# Patient Record
Sex: MALE | Race: WHITE | URBAN - METROPOLITAN AREA
[De-identification: names, ages, dates, MRNs, and addresses within clinical notes are randomized per-mention and may not be internally consistent; named-entity substitution may affect disease eponyms.]

---

## 2017-12-15 ENCOUNTER — OUTPATIENT (OUTPATIENT)
Dept: OUTPATIENT SERVICES | Facility: HOSPITAL | Age: 57
LOS: 1 days | Discharge: HOME | End: 2017-12-15

## 2017-12-15 DIAGNOSIS — A41.9 SEPSIS, UNSPECIFIED ORGANISM: ICD-10-CM

## 2017-12-15 DIAGNOSIS — C73 MALIGNANT NEOPLASM OF THYROID GLAND: ICD-10-CM

## 2017-12-15 DIAGNOSIS — J44.0 CHRONIC OBSTRUCTIVE PULMONARY DISEASE WITH (ACUTE) LOWER RESPIRATORY INFECTION: ICD-10-CM

## 2017-12-15 DIAGNOSIS — R91.8 OTHER NONSPECIFIC ABNORMAL FINDING OF LUNG FIELD: ICD-10-CM

## 2017-12-15 DIAGNOSIS — J44.1 CHRONIC OBSTRUCTIVE PULMONARY DISEASE WITH (ACUTE) EXACERBATION: ICD-10-CM

## 2017-12-15 DIAGNOSIS — I10 ESSENTIAL (PRIMARY) HYPERTENSION: ICD-10-CM

## 2017-12-15 DIAGNOSIS — J44.9 CHRONIC OBSTRUCTIVE PULMONARY DISEASE, UNSPECIFIED: ICD-10-CM

## 2017-12-15 DIAGNOSIS — E11.65 TYPE 2 DIABETES MELLITUS WITH HYPERGLYCEMIA: ICD-10-CM

## 2017-12-15 DIAGNOSIS — R78.81 BACTEREMIA: ICD-10-CM

## 2017-12-15 DIAGNOSIS — E06.3 AUTOIMMUNE THYROIDITIS: ICD-10-CM

## 2017-12-15 DIAGNOSIS — J15.9 UNSPECIFIED BACTERIAL PNEUMONIA: ICD-10-CM

## 2017-12-22 ENCOUNTER — EMERGENCY (EMERGENCY)
Facility: HOSPITAL | Age: 57
LOS: 0 days | Discharge: HOME | End: 2017-12-22

## 2017-12-22 DIAGNOSIS — E78.00 PURE HYPERCHOLESTEROLEMIA, UNSPECIFIED: ICD-10-CM

## 2017-12-22 DIAGNOSIS — A41.9 SEPSIS, UNSPECIFIED ORGANISM: ICD-10-CM

## 2017-12-22 DIAGNOSIS — J44.9 CHRONIC OBSTRUCTIVE PULMONARY DISEASE, UNSPECIFIED: ICD-10-CM

## 2017-12-22 DIAGNOSIS — R91.8 OTHER NONSPECIFIC ABNORMAL FINDING OF LUNG FIELD: ICD-10-CM

## 2017-12-22 DIAGNOSIS — J15.9 UNSPECIFIED BACTERIAL PNEUMONIA: ICD-10-CM

## 2017-12-22 DIAGNOSIS — R78.81 BACTEREMIA: ICD-10-CM

## 2017-12-22 DIAGNOSIS — R10.11 RIGHT UPPER QUADRANT PAIN: ICD-10-CM

## 2017-12-22 DIAGNOSIS — C73 MALIGNANT NEOPLASM OF THYROID GLAND: ICD-10-CM

## 2017-12-22 DIAGNOSIS — Z79.899 OTHER LONG TERM (CURRENT) DRUG THERAPY: ICD-10-CM

## 2017-12-22 DIAGNOSIS — J44.1 CHRONIC OBSTRUCTIVE PULMONARY DISEASE WITH (ACUTE) EXACERBATION: ICD-10-CM

## 2017-12-22 DIAGNOSIS — N28.1 CYST OF KIDNEY, ACQUIRED: ICD-10-CM

## 2017-12-22 DIAGNOSIS — I10 ESSENTIAL (PRIMARY) HYPERTENSION: ICD-10-CM

## 2017-12-22 DIAGNOSIS — R10.13 EPIGASTRIC PAIN: ICD-10-CM

## 2017-12-22 DIAGNOSIS — J44.0 CHRONIC OBSTRUCTIVE PULMONARY DISEASE WITH (ACUTE) LOWER RESPIRATORY INFECTION: ICD-10-CM

## 2017-12-22 DIAGNOSIS — R19.7 DIARRHEA, UNSPECIFIED: ICD-10-CM

## 2017-12-22 DIAGNOSIS — E66.9 OBESITY, UNSPECIFIED: ICD-10-CM

## 2017-12-22 DIAGNOSIS — E89.0 POSTPROCEDURAL HYPOTHYROIDISM: ICD-10-CM

## 2018-02-28 ENCOUNTER — EMERGENCY (EMERGENCY)
Facility: HOSPITAL | Age: 58
LOS: 0 days | Discharge: HOME | End: 2018-03-01
Attending: EMERGENCY MEDICINE

## 2018-02-28 VITALS
HEIGHT: 72 IN | OXYGEN SATURATION: 97 % | SYSTOLIC BLOOD PRESSURE: 188 MMHG | TEMPERATURE: 96 F | WEIGHT: 270.07 LBS | HEART RATE: 98 BPM | DIASTOLIC BLOOD PRESSURE: 102 MMHG | RESPIRATION RATE: 18 BRPM

## 2018-02-28 DIAGNOSIS — R07.89 OTHER CHEST PAIN: ICD-10-CM

## 2018-02-28 DIAGNOSIS — E89.0 POSTPROCEDURAL HYPOTHYROIDISM: Chronic | ICD-10-CM
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
## 2018-02-28 DIAGNOSIS — Z79.899 OTHER LONG TERM (CURRENT) DRUG THERAPY: ICD-10-CM

## 2018-02-28 DIAGNOSIS — S22.42XA MULTIPLE FRACTURES OF RIBS, LEFT SIDE, INITIAL ENCOUNTER FOR CLOSED FRACTURE: ICD-10-CM
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
## 2018-02-28 DIAGNOSIS — M54.9 DORSALGIA, UNSPECIFIED: ICD-10-CM

## 2018-02-28 DIAGNOSIS — Z90.89 ACQUIRED ABSENCE OF OTHER ORGANS: ICD-10-CM

## 2018-02-28 DIAGNOSIS — Z87.891 PERSONAL HISTORY OF NICOTINE DEPENDENCE: ICD-10-CM

## 2018-02-28 DIAGNOSIS — I10 ESSENTIAL (PRIMARY) HYPERTENSION: ICD-10-CM

## 2018-02-28 LAB
ALBUMIN SERPL ELPH-MCNC: 3.9 G/DL — SIGNIFICANT CHANGE UP (ref 3–5.5)
ALP SERPL-CCNC: 80 U/L — SIGNIFICANT CHANGE UP (ref 30–115)
ALT FLD-CCNC: 20 U/L — SIGNIFICANT CHANGE UP (ref 0–41)
ANION GAP SERPL CALC-SCNC: 8 MMOL/L — SIGNIFICANT CHANGE UP (ref 7–14)
APPEARANCE UR: CLEAR — SIGNIFICANT CHANGE UP
AST SERPL-CCNC: 19 U/L — SIGNIFICANT CHANGE UP (ref 0–41)
BACTERIA # UR AUTO: SIGNIFICANT CHANGE UP
BASOPHILS # BLD AUTO: 0.02 K/UL — SIGNIFICANT CHANGE UP (ref 0–0.2)
BASOPHILS NFR BLD AUTO: 0.2 % — SIGNIFICANT CHANGE UP (ref 0–1)
BILIRUB SERPL-MCNC: 0.7 MG/DL — SIGNIFICANT CHANGE UP (ref 0.2–1.2)
BILIRUB UR-MCNC: NEGATIVE — SIGNIFICANT CHANGE UP
BUN SERPL-MCNC: 26 MG/DL — HIGH (ref 10–20)
CALCIUM SERPL-MCNC: 9.3 MG/DL — SIGNIFICANT CHANGE UP (ref 8.5–10.1)
CHLORIDE SERPL-SCNC: 101 MMOL/L — SIGNIFICANT CHANGE UP (ref 98–110)
CO2 SERPL-SCNC: 27 MMOL/L — SIGNIFICANT CHANGE UP (ref 17–32)
COLOR SPEC: YELLOW — SIGNIFICANT CHANGE UP
COMMENT - URINE: SIGNIFICANT CHANGE UP
CREAT SERPL-MCNC: 0.9 MG/DL — SIGNIFICANT CHANGE UP (ref 0.7–1.5)
DIFF PNL FLD: (no result)
EOSINOPHIL # BLD AUTO: 0.36 K/UL — SIGNIFICANT CHANGE UP (ref 0–0.7)
EOSINOPHIL NFR BLD AUTO: 2.8 % — SIGNIFICANT CHANGE UP (ref 0–8)
EPI CELLS # UR: (no result) /HPF
GLUCOSE SERPL-MCNC: 89 MG/DL — SIGNIFICANT CHANGE UP (ref 70–110)
GLUCOSE UR QL: NEGATIVE MG/DL — SIGNIFICANT CHANGE UP
HCT VFR BLD CALC: 46 % — SIGNIFICANT CHANGE UP (ref 42–52)
HGB BLD-MCNC: 15.2 G/DL — SIGNIFICANT CHANGE UP (ref 14–18)
IMM GRANULOCYTES NFR BLD AUTO: 0.9 % — HIGH (ref 0.1–0.3)
KETONES UR-MCNC: NEGATIVE — SIGNIFICANT CHANGE UP
LEUKOCYTE ESTERASE UR-ACNC: NEGATIVE — SIGNIFICANT CHANGE UP
LYMPHOCYTES # BLD AUTO: 25.6 % — SIGNIFICANT CHANGE UP (ref 20.5–51.1)
LYMPHOCYTES # BLD AUTO: 3.31 K/UL — SIGNIFICANT CHANGE UP (ref 1.2–3.4)
MCHC RBC-ENTMCNC: 28.2 PG — SIGNIFICANT CHANGE UP (ref 27–31)
MCHC RBC-ENTMCNC: 33 G/DL — SIGNIFICANT CHANGE UP (ref 32–37)
MCV RBC AUTO: 85.3 FL — SIGNIFICANT CHANGE UP (ref 80–94)
MONOCYTES # BLD AUTO: 1.4 K/UL — HIGH (ref 0.1–0.6)
MONOCYTES NFR BLD AUTO: 10.8 % — HIGH (ref 1.7–9.3)
NEUTROPHILS # BLD AUTO: 7.71 K/UL — HIGH (ref 1.4–6.5)
NEUTROPHILS NFR BLD AUTO: 59.7 % — SIGNIFICANT CHANGE UP (ref 42.2–75.2)
NITRITE UR-MCNC: NEGATIVE — SIGNIFICANT CHANGE UP
NRBC # BLD: 0 /100 WBCS — SIGNIFICANT CHANGE UP (ref 0–0)
PH UR: 6 — SIGNIFICANT CHANGE UP (ref 5–8)
PLATELET # BLD AUTO: 326 K/UL — SIGNIFICANT CHANGE UP (ref 130–400)
POTASSIUM SERPL-MCNC: 4.5 MMOL/L — SIGNIFICANT CHANGE UP (ref 3.5–5)
POTASSIUM SERPL-SCNC: 4.5 MMOL/L — SIGNIFICANT CHANGE UP (ref 3.5–5)
PROT SERPL-MCNC: 7.2 G/DL — SIGNIFICANT CHANGE UP (ref 6–8)
PROT UR-MCNC: NEGATIVE MG/DL — SIGNIFICANT CHANGE UP
RBC # BLD: 5.39 M/UL — SIGNIFICANT CHANGE UP (ref 4.7–6.1)
RBC # FLD: 13 % — SIGNIFICANT CHANGE UP (ref 11.5–14.5)
RBC CASTS # UR COMP ASSIST: SIGNIFICANT CHANGE UP /HPF
SODIUM SERPL-SCNC: 136 MMOL/L — SIGNIFICANT CHANGE UP (ref 135–146)
SP GR SPEC: 1.02 — SIGNIFICANT CHANGE UP (ref 1.01–1.03)
UROBILINOGEN FLD QL: 0.2 MG/DL — SIGNIFICANT CHANGE UP (ref 0.2–0.2)
WBC # BLD: 12.92 K/UL — HIGH (ref 4.8–10.8)
WBC # FLD AUTO: 12.92 K/UL — HIGH (ref 4.8–10.8)
WBC UR QL: SIGNIFICANT CHANGE UP /HPF

## 2018-02-28 NOTE — ED ADULT TRIAGE NOTE - CHIEF COMPLAINT QUOTE
fell out of truck three feet down unto concrete last week. hit head.  no loc.  no blood thinners.  c/o left sided body pain and sob

## 2018-03-01 VITALS
DIASTOLIC BLOOD PRESSURE: 90 MMHG | HEART RATE: 90 BPM | TEMPERATURE: 96 F | OXYGEN SATURATION: 95 % | RESPIRATION RATE: 16 BRPM | SYSTOLIC BLOOD PRESSURE: 130 MMHG

## 2018-03-01 RX ORDER — ACETAMINOPHEN 500 MG
975 TABLET ORAL ONCE
Qty: 0 | Refills: 0 | Status: COMPLETED | OUTPATIENT
Start: 2018-03-01 | End: 2018-03-01

## 2018-03-01 RX ADMIN — Medication 975 MILLIGRAM(S): at 04:21

## 2018-03-01 NOTE — ED PROVIDER NOTE - PHYSICAL EXAMINATION
--EXAM--  VITAL SIGNS: I have reviewed vs documented at present.  CONSTITUTIONAL: Well-developed; well-nourished; in no acute distress.   SKIN: Warm and dry, no acute rash.   HEAD: Normocephalic; atraumatic.  EYES: PERRL, EOM intact; conjunctiva and sclera clear. No nystagmus.  ENT: No nasal discharge; airway clear.  NECK: Supple; non tender.  CARD: S1, S2, Regular rate and rhythm.  postive tenderness to left ribs no eccymosis no sign of trauma   RESP: No wheezes, rales or rhonchi.  ABD: Normal bowel sounds; soft; non-distended; non-tender.  EXT: Normal ROM.   NEURO: Alert, oriented, grossly unremarkable. Strength 5/5 in all extremities. Sensation intact throughout.  PSYCH: Cooperative, appropriate.

## 2018-03-01 NOTE — ED PROVIDER NOTE - ATTENDING CONTRIBUTION TO CARE
59 yo M PMHx COPD presets with c/o pain to back and sides s/p fall from truck earlier today.  Pt states that since fall he feels that he has been urinating less, no hematuria, no head injury, no numbness or tingling. On exam pt in NAD AAO x 3, abd is osft nt nd, no ecchymosis, + tender bilateral flanks and lower ribs, equal AE bilateral, Ext atraumatic, seen ambulate with steady gait  will image

## 2018-03-01 NOTE — ED PROVIDER NOTE - OBJECTIVE STATEMENT
56yo fall presents to ed s/p fall while at work. patient is complaining of left side pain. patient states he followed with clinic at work and they told him to return to work. patient states he is in a lot of pain and is not able to work . patient denies any chest pain for shortness of breath. no abdominal pain.

## 2018-03-01 NOTE — ED PROVIDER NOTE - PROGRESS NOTE DETAILS
Case endorsed to me by Dr. Mejia -- patient pending imagining of chest/abdomen pelvis sp fall from tractor/trailor onto L side. no LOC, no head trauma, only complaining of L chest wall pain, worse with deep breath. CT scan with multiple rib fractures, no PTX or Anupama -- discussed transfer to [facility] for trauma evaluation and likely admission and patient refused. On further history, patient fell on last Wednesday, no this Wed/today. He has been managing pain at home with NSAIDs, does not wish to be admitted because he is spending his time with his wife who is currently in a hospital in [place].     Given he is 1 week away from accident, has no signs of underlying lung injury, does not require narcotic analgesia and does not wish to be transferred, will dc home with copy of report, close follow up, incentive spirometer.

## 2018-03-01 NOTE — ED PROVIDER NOTE - NS ED ROS FT
Review of Systems:  	•	CONSTITUTIONAL - no fever, no diaphoresis, no chills  	•	SKIN - no rash  	•	HEMATOLOGIC - no bleeding, no bruising  	•	EYES - no eye pain, no blurry vision  	•	ENT - no change in hearing, no sore throat, no ear pain or tinnitus  	•	RESPIRATORY - no shortness of breath, no cough  	•	CARDIAC - no chest pain, no palpitations  	•	GI - no abd pain, no nausea, no vomiting, no diarrhea, no constipation  	•	GENITO-URINARY - no discharge, no dysuria; no hematuria, no increased urinary frequency  	•	MUSCULOSKELETAL - left side rib pain and left side back pain , no swelling, no redness  	•	NEUROLOGIC - no weakness, no headache, no paresthesias, no LOC  	•	PSYCH - no anxiety, non suicidal, non homicidal, no hallucination, no depression

## 2019-01-09 ENCOUNTER — EMERGENCY (EMERGENCY)
Facility: HOSPITAL | Age: 59
LOS: 0 days | Discharge: HOME | End: 2019-01-09
Attending: EMERGENCY MEDICINE | Admitting: EMERGENCY MEDICINE

## 2019-01-09 VITALS
TEMPERATURE: 98 F | HEART RATE: 91 BPM | OXYGEN SATURATION: 96 % | DIASTOLIC BLOOD PRESSURE: 101 MMHG | SYSTOLIC BLOOD PRESSURE: 145 MMHG | HEIGHT: 72 IN | WEIGHT: 274.92 LBS | RESPIRATION RATE: 19 BRPM

## 2019-01-09 VITALS
TEMPERATURE: 96 F | HEART RATE: 93 BPM | DIASTOLIC BLOOD PRESSURE: 965 MMHG | RESPIRATION RATE: 18 BRPM | SYSTOLIC BLOOD PRESSURE: 136 MMHG | OXYGEN SATURATION: 96 %

## 2019-01-09 DIAGNOSIS — R11.2 NAUSEA WITH VOMITING, UNSPECIFIED: ICD-10-CM

## 2019-01-09 DIAGNOSIS — E89.0 POSTPROCEDURAL HYPOTHYROIDISM: Chronic | ICD-10-CM

## 2019-01-09 DIAGNOSIS — R10.11 RIGHT UPPER QUADRANT PAIN: ICD-10-CM

## 2019-01-09 DIAGNOSIS — I10 ESSENTIAL (PRIMARY) HYPERTENSION: ICD-10-CM

## 2019-01-09 DIAGNOSIS — R19.7 DIARRHEA, UNSPECIFIED: ICD-10-CM

## 2019-01-09 DIAGNOSIS — Z79.51 LONG TERM (CURRENT) USE OF INHALED STEROIDS: ICD-10-CM

## 2019-01-09 DIAGNOSIS — R10.32 LEFT LOWER QUADRANT PAIN: ICD-10-CM

## 2019-01-09 DIAGNOSIS — Z98.890 OTHER SPECIFIED POSTPROCEDURAL STATES: ICD-10-CM

## 2019-01-09 DIAGNOSIS — Z79.899 OTHER LONG TERM (CURRENT) DRUG THERAPY: ICD-10-CM

## 2019-01-09 DIAGNOSIS — J44.9 CHRONIC OBSTRUCTIVE PULMONARY DISEASE, UNSPECIFIED: ICD-10-CM

## 2019-01-09 DIAGNOSIS — R91.1 SOLITARY PULMONARY NODULE: ICD-10-CM

## 2019-01-09 DIAGNOSIS — E11.9 TYPE 2 DIABETES MELLITUS WITHOUT COMPLICATIONS: ICD-10-CM

## 2019-01-09 PROBLEM — C73 MALIGNANT NEOPLASM OF THYROID GLAND: Chronic | Status: ACTIVE | Noted: 2018-02-28

## 2019-01-09 LAB
ALBUMIN SERPL ELPH-MCNC: 4.5 G/DL — SIGNIFICANT CHANGE UP (ref 3.5–5.2)
ALP SERPL-CCNC: 102 U/L — SIGNIFICANT CHANGE UP (ref 30–115)
ALT FLD-CCNC: 15 U/L — SIGNIFICANT CHANGE UP (ref 0–41)
ANION GAP SERPL CALC-SCNC: 8 MMOL/L — SIGNIFICANT CHANGE UP (ref 7–14)
AST SERPL-CCNC: 18 U/L — SIGNIFICANT CHANGE UP (ref 0–41)
BASOPHILS # BLD AUTO: 0.05 K/UL — SIGNIFICANT CHANGE UP (ref 0–0.2)
BASOPHILS NFR BLD AUTO: 0.4 % — SIGNIFICANT CHANGE UP (ref 0–1)
BILIRUB SERPL-MCNC: 0.9 MG/DL — SIGNIFICANT CHANGE UP (ref 0.2–1.2)
BUN SERPL-MCNC: 21 MG/DL — HIGH (ref 10–20)
CALCIUM SERPL-MCNC: 9.3 MG/DL — SIGNIFICANT CHANGE UP (ref 8.5–10.1)
CHLORIDE SERPL-SCNC: 106 MMOL/L — SIGNIFICANT CHANGE UP (ref 98–110)
CO2 SERPL-SCNC: 29 MMOL/L — SIGNIFICANT CHANGE UP (ref 17–32)
CREAT SERPL-MCNC: 1.1 MG/DL — SIGNIFICANT CHANGE UP (ref 0.7–1.5)
EOSINOPHIL # BLD AUTO: 0.29 K/UL — SIGNIFICANT CHANGE UP (ref 0–0.7)
EOSINOPHIL NFR BLD AUTO: 2.3 % — SIGNIFICANT CHANGE UP (ref 0–8)
GLUCOSE SERPL-MCNC: 89 MG/DL — SIGNIFICANT CHANGE UP (ref 70–99)
HCT VFR BLD CALC: 42.8 % — SIGNIFICANT CHANGE UP (ref 42–52)
HGB BLD-MCNC: 14.3 G/DL — SIGNIFICANT CHANGE UP (ref 14–18)
IMM GRANULOCYTES NFR BLD AUTO: 0.2 % — SIGNIFICANT CHANGE UP (ref 0.1–0.3)
LACTATE SERPL-SCNC: 1 MMOL/L — SIGNIFICANT CHANGE UP (ref 0.5–2.2)
LIDOCAIN IGE QN: 26 U/L — SIGNIFICANT CHANGE UP (ref 7–60)
LYMPHOCYTES # BLD AUTO: 17.4 % — LOW (ref 20.5–51.1)
LYMPHOCYTES # BLD AUTO: 2.23 K/UL — SIGNIFICANT CHANGE UP (ref 1.2–3.4)
MCHC RBC-ENTMCNC: 28.7 PG — SIGNIFICANT CHANGE UP (ref 27–31)
MCHC RBC-ENTMCNC: 33.4 G/DL — SIGNIFICANT CHANGE UP (ref 32–37)
MCV RBC AUTO: 85.9 FL — SIGNIFICANT CHANGE UP (ref 80–94)
MONOCYTES # BLD AUTO: 1.25 K/UL — HIGH (ref 0.1–0.6)
MONOCYTES NFR BLD AUTO: 9.7 % — HIGH (ref 1.7–9.3)
NEUTROPHILS # BLD AUTO: 9 K/UL — HIGH (ref 1.4–6.5)
NEUTROPHILS NFR BLD AUTO: 70 % — SIGNIFICANT CHANGE UP (ref 42.2–75.2)
NRBC # BLD: 0 /100 WBCS — SIGNIFICANT CHANGE UP (ref 0–0)
PLATELET # BLD AUTO: 252 K/UL — SIGNIFICANT CHANGE UP (ref 130–400)
POTASSIUM SERPL-MCNC: 4.4 MMOL/L — SIGNIFICANT CHANGE UP (ref 3.5–5)
POTASSIUM SERPL-SCNC: 4.4 MMOL/L — SIGNIFICANT CHANGE UP (ref 3.5–5)
PROT SERPL-MCNC: 6.9 G/DL — SIGNIFICANT CHANGE UP (ref 6–8)
RBC # BLD: 4.98 M/UL — SIGNIFICANT CHANGE UP (ref 4.7–6.1)
RBC # FLD: 13.3 % — SIGNIFICANT CHANGE UP (ref 11.5–14.5)
SODIUM SERPL-SCNC: 143 MMOL/L — SIGNIFICANT CHANGE UP (ref 135–146)
WBC # BLD: 12.85 K/UL — HIGH (ref 4.8–10.8)
WBC # FLD AUTO: 12.85 K/UL — HIGH (ref 4.8–10.8)

## 2019-01-09 RX ORDER — SODIUM CHLORIDE 9 MG/ML
1000 INJECTION INTRAMUSCULAR; INTRAVENOUS; SUBCUTANEOUS ONCE
Qty: 0 | Refills: 0 | Status: COMPLETED | OUTPATIENT
Start: 2019-01-09 | End: 2019-01-09

## 2019-01-09 RX ADMIN — SODIUM CHLORIDE 1000 MILLILITER(S): 9 INJECTION INTRAMUSCULAR; INTRAVENOUS; SUBCUTANEOUS at 13:33

## 2019-01-09 NOTE — ED PROVIDER NOTE - PROVIDER TOKENS
TOKEN:'[id number]',FREE:[LAST:[Your primary care provider],PHONE:[(   )    -],FAX:[(   )    -]]

## 2019-01-09 NOTE — ED PROVIDER NOTE - PMH
Borderline diabetes    COPD (chronic obstructive pulmonary disease)    HTN (hypertension)    Thyroid cancer

## 2019-01-09 NOTE — ED PROVIDER NOTE - CARE PROVIDER_API CALL
Indy Epps), Gastroenterology  [address] [place] [address]  Phone: [phone]  Fax: [phone]    Your primary care provider,   Phone: (   )    -  Fax: (   )    -

## 2019-01-09 NOTE — ED ADULT NURSE NOTE - PMH
HTN (hypertension)    Thyroid cancer Borderline diabetes    COPD (chronic obstructive pulmonary disease)    HTN (hypertension)    Thyroid cancer

## 2019-01-09 NOTE — ED ADULT NURSE NOTE - NSIMPLEMENTINTERV_GEN_ALL_ED
Implemented All Universal Safety Interventions:  [place] to call system. Call bell, personal items and telephone within reach. Instruct patient to call for assistance. Room bathroom lighting operational. Non-slip footwear when patient is off stretcher. Physically safe environment: no spills, clutter or unnecessary equipment. Stretcher in lowest position, wheels locked, appropriate side rails in place.

## 2019-01-09 NOTE — ED PROVIDER NOTE - OBJECTIVE STATEMENT
59 yo M with pmhx of borderline DM, COPD, HTN, thyroid cancer in remission presenting with lower abdominal pain since last night initially started as left lower quadrant pain now has pain to RUQ associated with nausea, vomiting, and diarrhea. No cp, sob, fever, chills, back pain, urinary symptoms, headache,  paresthesias, or weakness.

## 2019-01-09 NOTE — ED PROVIDER NOTE - MEDICAL DECISION MAKING DETAILS
58yM DM HTN COPD p/w LLQ --> RUQ pain and associated transient dizziness/flushed/presyncope.  +vomiting.  Labs reassuring.  RUQ US w/ cholelithiasis w/o cholecystitis.  CTA A/P without acute pathology. Pt improved during ED stay, comfortable, well appearing, hemodynamically stable.  Discussed dietary modification for gallstones in addition to DM/HTN, o/p gen surg f/u, supportive care, return precautions.

## 2019-01-09 NOTE — ED PROVIDER NOTE - NS ED ROS FT
Review of Systems:  	•	CONSTITUTIONAL - no fever, no diaphoresis, no chills  	•	SKIN - no rash  	•	HEMATOLOGIC - no bleeding, no bruising  	•	EYES - no eye pain, no blurry vision  	•	ENT - no congestion  	•	RESPIRATORY - no shortness of breath, no cough  	•	CARDIAC - no chest pain, no palpitations  	•	GI - +abd pain, +nausea, +vomiting, +diarrhea, no constipation  	•	GENITO-URINARY - no dysuria; no hematuria, no increased urinary frequency  	•	MUSCULOSKELETAL - no joint paint, no swelling, no redness  	•	NEUROLOGIC - no weakness, no headache, no paresthesias, no LOC  	•	PSYCH - no anxiety, non suicidal, non homicidal, no hallucination, no depression  	All other ROS are negative except as documented in HPI.

## 2019-01-09 NOTE — ED PROVIDER NOTE - ATTENDING CONTRIBUTION TO CARE
This is a 58yM HTN DM p/w LLQ pain --> RUQ pain x 2d.  Associated with nausea and now diarrhea.  No fevers.  Still tolerating fluids.  Has been having dizzy episodes for months that worsened today - felt weak and lightheaded but did not pass out today.  Pain is now RUQ, radiating to R back, intermittent.    PMH: HTN, DM COPD  Meds: trulicity  NKDA  former smoker    VS notable for HR 91 /101  CONSTITUTIONAL: well developed; well nourished; well appearing in no acute distress  HEAD: normocephalic; atraumatic  EYES: no conjunctival injection, no scleral icterus  ENT: no nasal discharge; airway clear.  NECK: supple; non tender. + full passive ROM in all directions  CARD: S1, S2 normal; no murmurs, gallops, or rubs. Regular rate and rhythm  RESP: no wheezes, rales or rhonchi. Good air movement bilaterally without significant accessory muscle use  ABD: soft; non-distended; RUQ pain, no R CVA tenderness or RLQ or LLQ pain, no rebound, no guarding, no pulsatile abdominal mass  EXT: moving all extremities spontaneously, normal ROM. No clubbing, cyanosis or edema  SKIN: warm and dry, no lesions noted  NEURO: alert, oriented, CN II-XII grossly intact,motor and sensory grossly intact, speech nonslurred, no focal deficits. GCS 15  PSYCH: calm, cooperative, appropriate, good eye contact, logical thought process, no apparent danger to self or others    abd pain, n/v/d  labs  RUQ US  fluids, antiemetics, pain meds as needed  consider CT

## 2019-07-25 ENCOUNTER — OUTPATIENT (OUTPATIENT)
Dept: OUTPATIENT SERVICES | Facility: HOSPITAL | Age: 59
LOS: 1 days | Discharge: HOME | End: 2019-07-25

## 2019-07-25 ENCOUNTER — TRANSCRIPTION ENCOUNTER (OUTPATIENT)
Age: 59
End: 2019-07-25

## 2019-07-25 DIAGNOSIS — E89.0 POSTPROCEDURAL HYPOTHYROIDISM: Chronic | ICD-10-CM

## 2019-07-26 DIAGNOSIS — R97.20 ELEVATED PROSTATE SPECIFIC ANTIGEN [PSA]: ICD-10-CM

## 2019-07-26 DIAGNOSIS — E78.5 HYPERLIPIDEMIA, UNSPECIFIED: ICD-10-CM

## 2019-07-26 DIAGNOSIS — R53.82 CHRONIC FATIGUE, UNSPECIFIED: ICD-10-CM

## 2019-07-26 DIAGNOSIS — A69.20 LYME DISEASE, UNSPECIFIED: ICD-10-CM

## 2019-07-26 DIAGNOSIS — M10.9 GOUT, UNSPECIFIED: ICD-10-CM

## 2019-07-26 DIAGNOSIS — N39.0 URINARY TRACT INFECTION, SITE NOT SPECIFIED: ICD-10-CM

## 2019-07-26 DIAGNOSIS — M25.50 PAIN IN UNSPECIFIED JOINT: ICD-10-CM

## 2019-07-26 DIAGNOSIS — E13.9 OTHER SPECIFIED DIABETES MELLITUS WITHOUT COMPLICATIONS: ICD-10-CM

## 2019-07-26 DIAGNOSIS — R70.0 ELEVATED ERYTHROCYTE SEDIMENTATION RATE: ICD-10-CM

## 2019-07-26 DIAGNOSIS — D51.9 VITAMIN B12 DEFICIENCY ANEMIA, UNSPECIFIED: ICD-10-CM

## 2019-07-26 DIAGNOSIS — R94.6 ABNORMAL RESULTS OF THYROID FUNCTION STUDIES: ICD-10-CM

## 2019-07-26 PROBLEM — R73.03 PREDIABETES: Chronic | Status: ACTIVE | Noted: 2019-01-09

## 2019-07-26 PROBLEM — J44.9 CHRONIC OBSTRUCTIVE PULMONARY DISEASE, UNSPECIFIED: Chronic | Status: ACTIVE | Noted: 2019-01-09

## 2019-11-23 ENCOUNTER — EMERGENCY (EMERGENCY)
Facility: HOSPITAL | Age: 59
LOS: 0 days | Discharge: HOME | End: 2019-11-23
Attending: EMERGENCY MEDICINE | Admitting: EMERGENCY MEDICINE
Payer: COMMERCIAL

## 2019-11-23 VITALS
RESPIRATION RATE: 18 BRPM | DIASTOLIC BLOOD PRESSURE: 91 MMHG | OXYGEN SATURATION: 99 % | SYSTOLIC BLOOD PRESSURE: 162 MMHG | HEART RATE: 65 BPM

## 2019-11-23 VITALS — HEIGHT: 72 IN | WEIGHT: 279.99 LBS

## 2019-11-23 DIAGNOSIS — R42 DIZZINESS AND GIDDINESS: ICD-10-CM

## 2019-11-23 DIAGNOSIS — R07.89 OTHER CHEST PAIN: ICD-10-CM

## 2019-11-23 DIAGNOSIS — E89.0 POSTPROCEDURAL HYPOTHYROIDISM: ICD-10-CM

## 2019-11-23 DIAGNOSIS — I10 ESSENTIAL (PRIMARY) HYPERTENSION: ICD-10-CM

## 2019-11-23 DIAGNOSIS — R73.03 PREDIABETES: ICD-10-CM

## 2019-11-23 DIAGNOSIS — R51 HEADACHE: ICD-10-CM

## 2019-11-23 DIAGNOSIS — E89.0 POSTPROCEDURAL HYPOTHYROIDISM: Chronic | ICD-10-CM

## 2019-11-23 LAB
ALBUMIN SERPL ELPH-MCNC: 4.3 G/DL — SIGNIFICANT CHANGE UP (ref 3.5–5.2)
ALP SERPL-CCNC: 101 U/L — SIGNIFICANT CHANGE UP (ref 30–115)
ALT FLD-CCNC: 12 U/L — SIGNIFICANT CHANGE UP (ref 0–41)
ANION GAP SERPL CALC-SCNC: 12 MMOL/L — SIGNIFICANT CHANGE UP (ref 7–14)
APTT BLD: 32.2 SEC — SIGNIFICANT CHANGE UP (ref 27–39.2)
AST SERPL-CCNC: 14 U/L — SIGNIFICANT CHANGE UP (ref 0–41)
BASOPHILS # BLD AUTO: 0.05 K/UL — SIGNIFICANT CHANGE UP (ref 0–0.2)
BASOPHILS NFR BLD AUTO: 0.4 % — SIGNIFICANT CHANGE UP (ref 0–1)
BILIRUB SERPL-MCNC: 0.5 MG/DL — SIGNIFICANT CHANGE UP (ref 0.2–1.2)
BUN SERPL-MCNC: 17 MG/DL — SIGNIFICANT CHANGE UP (ref 10–20)
CALCIUM SERPL-MCNC: 9.3 MG/DL — SIGNIFICANT CHANGE UP (ref 8.5–10.1)
CHLORIDE SERPL-SCNC: 103 MMOL/L — SIGNIFICANT CHANGE UP (ref 98–110)
CO2 SERPL-SCNC: 28 MMOL/L — SIGNIFICANT CHANGE UP (ref 17–32)
CREAT SERPL-MCNC: 1 MG/DL — SIGNIFICANT CHANGE UP (ref 0.7–1.5)
EOSINOPHIL # BLD AUTO: 0.35 K/UL — SIGNIFICANT CHANGE UP (ref 0–0.7)
EOSINOPHIL NFR BLD AUTO: 2.9 % — SIGNIFICANT CHANGE UP (ref 0–8)
GLUCOSE SERPL-MCNC: 88 MG/DL — SIGNIFICANT CHANGE UP (ref 70–99)
HCT VFR BLD CALC: 41.1 % — LOW (ref 42–52)
HGB BLD-MCNC: 13.4 G/DL — LOW (ref 14–18)
IMM GRANULOCYTES NFR BLD AUTO: 0.3 % — SIGNIFICANT CHANGE UP (ref 0.1–0.3)
INR BLD: 1.05 RATIO — SIGNIFICANT CHANGE UP (ref 0.65–1.3)
LYMPHOCYTES # BLD AUTO: 17.6 % — LOW (ref 20.5–51.1)
LYMPHOCYTES # BLD AUTO: 2.09 K/UL — SIGNIFICANT CHANGE UP (ref 1.2–3.4)
MAGNESIUM SERPL-MCNC: 1.8 MG/DL — SIGNIFICANT CHANGE UP (ref 1.8–2.4)
MCHC RBC-ENTMCNC: 28.3 PG — SIGNIFICANT CHANGE UP (ref 27–31)
MCHC RBC-ENTMCNC: 32.6 G/DL — SIGNIFICANT CHANGE UP (ref 32–37)
MCV RBC AUTO: 86.9 FL — SIGNIFICANT CHANGE UP (ref 80–94)
MONOCYTES # BLD AUTO: 1 K/UL — HIGH (ref 0.1–0.6)
MONOCYTES NFR BLD AUTO: 8.4 % — SIGNIFICANT CHANGE UP (ref 1.7–9.3)
NEUTROPHILS # BLD AUTO: 8.37 K/UL — HIGH (ref 1.4–6.5)
NEUTROPHILS NFR BLD AUTO: 70.4 % — SIGNIFICANT CHANGE UP (ref 42.2–75.2)
NRBC # BLD: 0 /100 WBCS — SIGNIFICANT CHANGE UP (ref 0–0)
PLATELET # BLD AUTO: 235 K/UL — SIGNIFICANT CHANGE UP (ref 130–400)
POTASSIUM SERPL-MCNC: 4.1 MMOL/L — SIGNIFICANT CHANGE UP (ref 3.5–5)
POTASSIUM SERPL-SCNC: 4.1 MMOL/L — SIGNIFICANT CHANGE UP (ref 3.5–5)
PROT SERPL-MCNC: 6.8 G/DL — SIGNIFICANT CHANGE UP (ref 6–8)
PROTHROM AB SERPL-ACNC: 12.1 SEC — SIGNIFICANT CHANGE UP (ref 9.95–12.87)
RBC # BLD: 4.73 M/UL — SIGNIFICANT CHANGE UP (ref 4.7–6.1)
RBC # FLD: 12.9 % — SIGNIFICANT CHANGE UP (ref 11.5–14.5)
SODIUM SERPL-SCNC: 143 MMOL/L — SIGNIFICANT CHANGE UP (ref 135–146)
TROPONIN T SERPL-MCNC: <0.01 NG/ML — SIGNIFICANT CHANGE UP
WBC # BLD: 11.9 K/UL — HIGH (ref 4.8–10.8)
WBC # FLD AUTO: 11.9 K/UL — HIGH (ref 4.8–10.8)

## 2019-11-23 PROCEDURE — 71046 X-RAY EXAM CHEST 2 VIEWS: CPT | Mod: 26

## 2019-11-23 PROCEDURE — 70450 CT HEAD/BRAIN W/O DYE: CPT | Mod: 26

## 2019-11-23 PROCEDURE — 99285 EMERGENCY DEPT VISIT HI MDM: CPT

## 2019-11-23 RX ORDER — DULAGLUTIDE 4.5 MG/.5ML
0 INJECTION, SOLUTION SUBCUTANEOUS
Qty: 0 | Refills: 0 | DISCHARGE

## 2019-11-23 NOTE — ED PROVIDER NOTE - ATTENDING CONTRIBUTION TO CARE
I was present for and supervised the key and critical aspects of the procedures performed during the care of the patient. I personally evaluated the patient. I reviewed the Resident’s or Physician Assistant’s note (as assigned above), and agree with the findings and plan except as documented in my note.   60 y/o M with PMH COPD and thyroid CA, s/p thyroidectomy, presents with chest discomfort, associated with some lightheadedness and dizziness that started a few hours PTA. 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 No SOB or palpitations, and pt reports CP is improving. Pt also states the first SX he felt was the lightheadedness, to which he still is feeling some dizziness and HA. No numbness, tingling, vision changes or focal weakness. On exam: NCAT, (+) hoarse voice at baseline, secondary to thyroidectomy. PERRLA, EOMI. OP clear. Lungs CTAB. RRR, S1S2 noted. Radial pulses 2+ and equal, pedal pulses 2+ and equal. Abdomen soft, NT/ND, no rebound or guarding. FROM x4 extremities. No focal neuro deficits. Plan: will get EKG, labs including Troponin, CXR and CT head.

## 2019-11-23 NOTE — ED ADULT NURSE NOTE - NSIMPLEMENTINTERV_GEN_ALL_ED
Implemented All Universal Safety Interventions:  [place] to call system. Call bell, personal items and telephone within reach. Instruct patient to call for assistance. Room bathroom lighting operational. Non-slip footwear when patient is off stretcher. Physically safe environment: no spills, clutter or unnecessary equipment. Stretcher in lowest position, wheels locked, appropriate side rails in place.

## 2019-11-23 NOTE — ED PROVIDER NOTE - PATIENT PORTAL LINK FT
You can access the FollowMyHealth Patient Portal offered by [facility] by registering at the following website: [email or web address]. By joining [facility]’s FollowMyHealth portal, you will also be able to view your health information using other applications (apps) compatible with our system.

## 2019-11-23 NOTE — ED PROVIDER NOTE - OBJECTIVE STATEMENT
Patient c/o chest pain , HA, lightheaded, which started 1 hours PTA, Chest pain resolved after several mins but HA and lightheadedness persists. No SOB, no N/V, no change in vision, speech or gait.

## 2019-11-23 NOTE — ED PROVIDER NOTE - CLINICAL SUMMARY MEDICAL DECISION MAKING FREE TEXT BOX
patient improved at this time troponin negative  ct negative for acute issues, he is now asymtpomatic offered admission in addition to repeat troponin he refuses at this time we discussed importance of follow up to ed I will discharge at this time

## 2019-11-23 NOTE — ED ADULT NURSE NOTE - CHIEF COMPLAINT QUOTE
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

## 2020-01-14 NOTE — ED ADULT NURSE NOTE - NS ED NURSE RECORD ANOTHER HT AND WT
Patient notified of pathology results and follow-up plan.    Copy of pathology results and plan sent to PCP Yes

## 2020-01-26 ENCOUNTER — EMERGENCY (EMERGENCY)
Facility: HOSPITAL | Age: 60
LOS: 0 days | Discharge: HOME | End: 2020-01-26
Attending: EMERGENCY MEDICINE | Admitting: EMERGENCY MEDICINE
Payer: COMMERCIAL

## 2020-01-26 VITALS
DIASTOLIC BLOOD PRESSURE: 105 MMHG | OXYGEN SATURATION: 98 % | SYSTOLIC BLOOD PRESSURE: 180 MMHG | TEMPERATURE: 98 F | WEIGHT: 274.92 LBS | RESPIRATION RATE: 20 BRPM | HEART RATE: 94 BPM | HEIGHT: 71 IN

## 2020-01-26 VITALS — SYSTOLIC BLOOD PRESSURE: 167 MMHG | DIASTOLIC BLOOD PRESSURE: 100 MMHG

## 2020-01-26 DIAGNOSIS — R06.02 SHORTNESS OF BREATH: ICD-10-CM

## 2020-01-26 DIAGNOSIS — E89.0 POSTPROCEDURAL HYPOTHYROIDISM: Chronic | ICD-10-CM

## 2020-01-26 DIAGNOSIS — Z87.891 PERSONAL HISTORY OF NICOTINE DEPENDENCE: ICD-10-CM

## 2020-01-26 DIAGNOSIS — J44.1 CHRONIC OBSTRUCTIVE PULMONARY DISEASE WITH (ACUTE) EXACERBATION: ICD-10-CM

## 2020-01-26 PROCEDURE — 99284 EMERGENCY DEPT VISIT MOD MDM: CPT

## 2020-01-26 PROCEDURE — 71046 X-RAY EXAM CHEST 2 VIEWS: CPT | Mod: 26

## 2020-01-26 RX ORDER — IPRATROPIUM/ALBUTEROL SULFATE 18-103MCG
3 AEROSOL WITH ADAPTER (GRAM) INHALATION ONCE
Refills: 0 | Status: COMPLETED | OUTPATIENT
Start: 2020-01-26 | End: 2020-01-26

## 2020-01-26 RX ADMIN — Medication 3 MILLILITER(S): at 12:56

## 2020-01-26 RX ADMIN — Medication 3 MILLILITER(S): at 12:36

## 2020-01-26 RX ADMIN — Medication 50 MILLIGRAM(S): at 12:54

## 2020-01-26 RX ADMIN — Medication 3 MILLILITER(S): at 13:16

## 2020-01-26 NOTE — ED PROVIDER NOTE - CARE PROVIDER_API CALL
Ted Brown (DO)  Critical Care Medicine; Pulmonary Disease; Sleep Medicine  [address] [place] [address], [place] [address]  Phone: [phone]  Fax: [phone]  Follow Up Time:

## 2020-01-26 NOTE — ED PROVIDER NOTE - OBJECTIVE STATEMENT
58 y/o M PMH asthma and COPD p/w SOB. Pt states that he has been having this SOB for the last 3 days. It is not made better with any albuterol through the inhaler or nebulizer. Pt is also taking 10mg of prednisone with minimal relief. Pt has no soreness of the throat, no CP or any fevers, chills, n/v/d. Pt is tolerating PO well and has no other complaints.

## 2020-01-26 NOTE — ED PROVIDER NOTE - PATIENT PORTAL LINK FT
You can access the FollowMyHealth Patient Portal offered by [facility] by registering at the following website: [email or web address]. By joining [facility]’s FollowMyHealth portal, you will also be able to view your health information using other applications (apps) compatible with our system.

## 2020-01-26 NOTE — ED PROVIDER NOTE - PHYSICAL EXAMINATION
Vital Signs: I have reviewed the initial vital signs.  Constitutional: well-nourished, no acute distress, normocephalic  Cardiovascular: regular rate, regular rhythm, no murmur appreciated  Respiratory: (+) SOB, (+) wheezing in no resp distress   Gastrointestinal: soft, non-tender, non-distended  abdomen, no pulsatile mass  Integumentary: warm, dry, no rash  Neurologic: awake, alert, cranial nerves II-XII grossly intact, extremities’ motor and sensory functions grossly intact, no focal deficits, GCS 15  Psychiatric: appropriate mood, appropriate affect Vital Signs: I have reviewed the initial vital signs.  Constitutional: well-nourished, no acute distress  Cardiovascular: regular rate, regular rhythm, no murmur appreciated  Respiratory: (+) SOB, (+) wheezing b/l in no resp distress , good air entry  Gastrointestinal: soft, non-tender, non-distended  abdomen, no pulsatile mass  Integumentary: warm, dry, no rash  Neurologic: awake, alert, cranial nerves II-XII grossly intact, extremities’ motor and sensory functions grossly intact, no focal deficits, GCS 15

## 2020-01-26 NOTE — ED PROVIDER NOTE - NSFOLLOWUPINSTRUCTIONS_ED_ALL_ED_FT
Chronic Obstructive Pulmonary Disease    Chronic obstructive pulmonary disease (COPD) is a lung condition in which airflow from the lungs is limited. Causes include smoking, secondhand smoke exposure, genetics, or recurrent infections. Take all medicines (inhaled or pills) as directed by your health care provider. Avoid exposure to irritants such as smoke, chemicals, and fumes that aggravate your breathing.    If you are a smoker, the most important thing that you can do is stop smoking. Continuing to smoke will cause further lung damage and breathing trouble. Ask your health care provider for help with quitting smoking.    SEEK IMMEDIATE MEDICAL CARE IF YOU HAVE ANY OF THE FOLLOWING SYMPTOMS: shortness of breath at rest or when talking, bluish discoloration of lips, skin, fever, worsening cough, unexplained chest pain, or lightheadedness/dizziness.            take prednisone 60 mg daily x 5 days .   continue albuterol nebulizers 4 x a day

## 2020-01-26 NOTE — ED PROVIDER NOTE - NS ED ROS FT
Review of Systems  Constitutional: (-) fever/ chills (-) weight loss  Cardiovascular: (-) chest pain, (-) syncope (-) palpitations  Respiratory: (+) cough, (+) shortness of breath, (+) Wheezing  Gastrointestinal: (-) vomiting, (-) diarrhea (-) abdominal pain  Integumentary: (-) rash, (-) swelling  Neurological: (-) headache, (-) altered mental status  Psychiatric: (-) hallucinations or depression   Allergic/Immunologic: (-) pruritus Review of Systems  Constitutional: (-) fever/ chills  Cardiovascular: (-) chest pain, (-) syncope (-) palpitations  Respiratory: (+) cough, (+) shortness of breath,  Gastrointestinal: (-) vomiting, (-) diarrhea (-) abdominal pain  Integumentary: (-) rash, (-) swelling  Neurological: (-) headache, (-) altered mental status  Psychiatric: (-) hallucinations or depression   Allergic/Immunologic: (-) pruritus

## 2020-01-26 NOTE — ED PROVIDER NOTE - ATTENDING CONTRIBUTION TO CARE
copd exacerbation, improved aftr nebs, will d/c steroids/nebs (has prednisone at home) to f/u with pmd. Patient counseled regarding conditions which should prompt return.

## 2020-02-01 ENCOUNTER — INPATIENT (INPATIENT)
Facility: HOSPITAL | Age: 60
LOS: 5 days | Discharge: HOME | End: 2020-02-07
Attending: HOSPITALIST | Admitting: HOSPITALIST
Payer: COMMERCIAL

## 2020-02-01 VITALS
HEIGHT: 71 IN | HEART RATE: 103 BPM | WEIGHT: 274.92 LBS | SYSTOLIC BLOOD PRESSURE: 183 MMHG | RESPIRATION RATE: 22 BRPM | OXYGEN SATURATION: 98 % | DIASTOLIC BLOOD PRESSURE: 105 MMHG | TEMPERATURE: 98 F

## 2020-02-01 DIAGNOSIS — E89.0 POSTPROCEDURAL HYPOTHYROIDISM: Chronic | ICD-10-CM

## 2020-02-01 LAB
ALBUMIN SERPL ELPH-MCNC: 4.1 G/DL — SIGNIFICANT CHANGE UP (ref 3.5–5.2)
ALP SERPL-CCNC: 90 U/L — SIGNIFICANT CHANGE UP (ref 30–115)
ALT FLD-CCNC: 11 U/L — SIGNIFICANT CHANGE UP (ref 0–41)
ANION GAP SERPL CALC-SCNC: 16 MMOL/L — HIGH (ref 7–14)
AST SERPL-CCNC: 11 U/L — SIGNIFICANT CHANGE UP (ref 0–41)
BASE EXCESS BLDV CALC-SCNC: 1.2 MMOL/L — SIGNIFICANT CHANGE UP (ref -2–2)
BILIRUB SERPL-MCNC: 0.3 MG/DL — SIGNIFICANT CHANGE UP (ref 0.2–1.2)
BUN SERPL-MCNC: 21 MG/DL — HIGH (ref 10–20)
CA-I SERPL-SCNC: 1.21 MMOL/L — SIGNIFICANT CHANGE UP (ref 1.12–1.3)
CALCIUM SERPL-MCNC: 9.1 MG/DL — SIGNIFICANT CHANGE UP (ref 8.5–10.1)
CHLORIDE SERPL-SCNC: 101 MMOL/L — SIGNIFICANT CHANGE UP (ref 98–110)
CO2 SERPL-SCNC: 24 MMOL/L — SIGNIFICANT CHANGE UP (ref 17–32)
CREAT SERPL-MCNC: 0.8 MG/DL — SIGNIFICANT CHANGE UP (ref 0.7–1.5)
FLU A RESULT: NEGATIVE — SIGNIFICANT CHANGE UP
FLU A RESULT: NEGATIVE — SIGNIFICANT CHANGE UP
FLUAV AG NPH QL: NEGATIVE — SIGNIFICANT CHANGE UP
FLUBV AG NPH QL: NEGATIVE — SIGNIFICANT CHANGE UP
GAS PNL BLDV: 138 MMOL/L — SIGNIFICANT CHANGE UP (ref 136–145)
GAS PNL BLDV: SIGNIFICANT CHANGE UP
GLUCOSE BLDC GLUCOMTR-MCNC: 131 MG/DL — HIGH (ref 70–99)
GLUCOSE BLDC GLUCOMTR-MCNC: 222 MG/DL — HIGH (ref 70–99)
GLUCOSE SERPL-MCNC: 147 MG/DL — HIGH (ref 70–99)
HCO3 BLDV-SCNC: 26 MMOL/L — SIGNIFICANT CHANGE UP (ref 22–29)
HCT VFR BLD CALC: 42 % — SIGNIFICANT CHANGE UP (ref 42–52)
HCT VFR BLDA CALC: 46.8 % — HIGH (ref 34–44)
HGB BLD CALC-MCNC: 15.3 G/DL — SIGNIFICANT CHANGE UP (ref 14–18)
HGB BLD-MCNC: 13.9 G/DL — LOW (ref 14–18)
HOROWITZ INDEX BLDV+IHG-RTO: 21 — SIGNIFICANT CHANGE UP
LACTATE BLDV-MCNC: 2.3 MMOL/L — HIGH (ref 0.5–1.6)
MAGNESIUM SERPL-MCNC: 1.7 MG/DL — LOW (ref 1.8–2.4)
MCHC RBC-ENTMCNC: 29 PG — SIGNIFICANT CHANGE UP (ref 27–31)
MCHC RBC-ENTMCNC: 33.1 G/DL — SIGNIFICANT CHANGE UP (ref 32–37)
MCV RBC AUTO: 87.7 FL — SIGNIFICANT CHANGE UP (ref 80–94)
NRBC # BLD: 0 /100 WBCS — SIGNIFICANT CHANGE UP (ref 0–0)
PCO2 BLDV: 41 MMHG — SIGNIFICANT CHANGE UP (ref 41–51)
PH BLDV: 7.41 — SIGNIFICANT CHANGE UP (ref 7.26–7.43)
PLATELET # BLD AUTO: 269 K/UL — SIGNIFICANT CHANGE UP (ref 130–400)
PO2 BLDV: 73 MMHG — HIGH (ref 20–40)
POTASSIUM BLDV-SCNC: 4.1 MMOL/L — SIGNIFICANT CHANGE UP (ref 3.3–5.6)
POTASSIUM SERPL-MCNC: 3.9 MMOL/L — SIGNIFICANT CHANGE UP (ref 3.5–5)
POTASSIUM SERPL-SCNC: 3.9 MMOL/L — SIGNIFICANT CHANGE UP (ref 3.5–5)
PROT SERPL-MCNC: 6.6 G/DL — SIGNIFICANT CHANGE UP (ref 6–8)
RBC # BLD: 4.79 M/UL — SIGNIFICANT CHANGE UP (ref 4.7–6.1)
RBC # FLD: 13.2 % — SIGNIFICANT CHANGE UP (ref 11.5–14.5)
RSV RESULT: NEGATIVE — SIGNIFICANT CHANGE UP
RSV RNA RESP QL NAA+PROBE: NEGATIVE — SIGNIFICANT CHANGE UP
SAO2 % BLDV: 96 % — SIGNIFICANT CHANGE UP
SODIUM SERPL-SCNC: 141 MMOL/L — SIGNIFICANT CHANGE UP (ref 135–146)
WBC # BLD: 18.55 K/UL — HIGH (ref 4.8–10.8)
WBC # FLD AUTO: 18.55 K/UL — HIGH (ref 4.8–10.8)

## 2020-02-01 PROCEDURE — 99285 EMERGENCY DEPT VISIT HI MDM: CPT

## 2020-02-01 PROCEDURE — 71046 X-RAY EXAM CHEST 2 VIEWS: CPT | Mod: 26

## 2020-02-01 PROCEDURE — 99223 1ST HOSP IP/OBS HIGH 75: CPT

## 2020-02-01 RX ORDER — BUDESONIDE AND FORMOTEROL FUMARATE DIHYDRATE 160; 4.5 UG/1; UG/1
2 AEROSOL RESPIRATORY (INHALATION)
Refills: 0 | Status: DISCONTINUED | OUTPATIENT
Start: 2020-02-01 | End: 2020-02-07

## 2020-02-01 RX ORDER — GLUCAGON INJECTION, SOLUTION 0.5 MG/.1ML
1 INJECTION, SOLUTION SUBCUTANEOUS ONCE
Refills: 0 | Status: DISCONTINUED | OUTPATIENT
Start: 2020-02-01 | End: 2020-02-07

## 2020-02-01 RX ORDER — IPRATROPIUM/ALBUTEROL SULFATE 18-103MCG
3 AEROSOL WITH ADAPTER (GRAM) INHALATION EVERY 6 HOURS
Refills: 0 | Status: DISCONTINUED | OUTPATIENT
Start: 2020-02-01 | End: 2020-02-07

## 2020-02-01 RX ORDER — INSULIN LISPRO 100/ML
VIAL (ML) SUBCUTANEOUS
Refills: 0 | Status: DISCONTINUED | OUTPATIENT
Start: 2020-02-01 | End: 2020-02-07

## 2020-02-01 RX ORDER — MONTELUKAST 4 MG/1
10 TABLET, CHEWABLE ORAL DAILY
Refills: 0 | Status: DISCONTINUED | OUTPATIENT
Start: 2020-02-01 | End: 2020-02-07

## 2020-02-01 RX ORDER — PANTOPRAZOLE SODIUM 20 MG/1
40 TABLET, DELAYED RELEASE ORAL
Refills: 0 | Status: DISCONTINUED | OUTPATIENT
Start: 2020-02-01 | End: 2020-02-07

## 2020-02-01 RX ORDER — CHLORHEXIDINE GLUCONATE 213 G/1000ML
1 SOLUTION TOPICAL DAILY
Refills: 0 | Status: DISCONTINUED | OUTPATIENT
Start: 2020-02-01 | End: 2020-02-07

## 2020-02-01 RX ORDER — ALBUTEROL 90 UG/1
2 AEROSOL, METERED ORAL EVERY 6 HOURS
Refills: 0 | Status: DISCONTINUED | OUTPATIENT
Start: 2020-02-01 | End: 2020-02-07

## 2020-02-01 RX ORDER — METOPROLOL TARTRATE 50 MG
25 TABLET ORAL DAILY
Refills: 0 | Status: DISCONTINUED | OUTPATIENT
Start: 2020-02-01 | End: 2020-02-07

## 2020-02-01 RX ORDER — SODIUM CHLORIDE 9 MG/ML
1000 INJECTION, SOLUTION INTRAVENOUS
Refills: 0 | Status: DISCONTINUED | OUTPATIENT
Start: 2020-02-01 | End: 2020-02-07

## 2020-02-01 RX ORDER — IPRATROPIUM/ALBUTEROL SULFATE 18-103MCG
3 AEROSOL WITH ADAPTER (GRAM) INHALATION ONCE
Refills: 0 | Status: COMPLETED | OUTPATIENT
Start: 2020-02-01 | End: 2020-02-01

## 2020-02-01 RX ORDER — DEXTROSE 50 % IN WATER 50 %
25 SYRINGE (ML) INTRAVENOUS ONCE
Refills: 0 | Status: DISCONTINUED | OUTPATIENT
Start: 2020-02-01 | End: 2020-02-07

## 2020-02-01 RX ORDER — DEXTROSE 50 % IN WATER 50 %
12.5 SYRINGE (ML) INTRAVENOUS ONCE
Refills: 0 | Status: DISCONTINUED | OUTPATIENT
Start: 2020-02-01 | End: 2020-02-07

## 2020-02-01 RX ORDER — TIOTROPIUM BROMIDE 18 UG/1
1 CAPSULE ORAL; RESPIRATORY (INHALATION) DAILY
Refills: 0 | Status: DISCONTINUED | OUTPATIENT
Start: 2020-02-01 | End: 2020-02-07

## 2020-02-01 RX ORDER — MAGNESIUM SULFATE 500 MG/ML
2 VIAL (ML) INJECTION ONCE
Refills: 0 | Status: COMPLETED | OUTPATIENT
Start: 2020-02-01 | End: 2020-02-01

## 2020-02-01 RX ORDER — DEXTROSE 50 % IN WATER 50 %
15 SYRINGE (ML) INTRAVENOUS ONCE
Refills: 0 | Status: DISCONTINUED | OUTPATIENT
Start: 2020-02-01 | End: 2020-02-07

## 2020-02-01 RX ORDER — LEVOTHYROXINE SODIUM 125 MCG
175 TABLET ORAL DAILY
Refills: 0 | Status: DISCONTINUED | OUTPATIENT
Start: 2020-02-01 | End: 2020-02-07

## 2020-02-01 RX ADMIN — Medication 40 MILLIGRAM(S): at 17:26

## 2020-02-01 RX ADMIN — Medication 50 GRAM(S): at 06:32

## 2020-02-01 RX ADMIN — TIOTROPIUM BROMIDE 1 CAPSULE(S): 18 CAPSULE ORAL; RESPIRATORY (INHALATION) at 15:00

## 2020-02-01 RX ADMIN — Medication 3 MILLILITER(S): at 06:56

## 2020-02-01 RX ADMIN — Medication 3 MILLILITER(S): at 06:32

## 2020-02-01 RX ADMIN — Medication 2: at 17:24

## 2020-02-01 RX ADMIN — BUDESONIDE AND FORMOTEROL FUMARATE DIHYDRATE 2 PUFF(S): 160; 4.5 AEROSOL RESPIRATORY (INHALATION) at 19:51

## 2020-02-01 RX ADMIN — Medication 50 MILLIGRAM(S): at 06:35

## 2020-02-01 RX ADMIN — Medication 3 MILLILITER(S): at 06:15

## 2020-02-01 NOTE — ED PROVIDER NOTE - ATTENDING CONTRIBUTION TO CARE
60yo M with PMHx COPD presents for SOB and wheezing for past several days. Pt was seen in ED 4 days ago for COPD exacerbation, discharged. states has been taking 50mg prednisone since without improvement and worsening today despite albuterol at home. Denies fever, chest pain, leg swelling.    Vital signs reviewed  GENERAL: Patient nontoxic appearing  HEAD: NCAT  EYES: Anicteric  ENT: MMM  RESPIRATORY: Slightly increased respiratory effort. Speaks medium length sentences. Diffuse wheezing.  CARDIOVASCULAR: Regular rate and rhythm  ABDOMEN: Soft. Nondistended. Nontender. No guarding or rebound. No CVA tenderness.  MUSCULOSKELETAL/EXTREMITIES: Brisk cap refill. Equal radial pulses. No leg edema.  SKIN:  Warm and dry  NEURO: AAOx3. No gross FND.

## 2020-02-01 NOTE — PATIENT PROFILE ADULT - FUNCTIONAL SCREEN CURRENT LEVEL: SWALLOWING (IF SCORE 2 OR MORE FOR ANY ITEM, CONSULT REHAB SERVICES), MLM)
What Type Of Note Output Would You Prefer (Optional)?: Standard Output Hpi Title: Evaluation of Skin Lesions How Severe Are Your Spot(S)?: moderate Have Your Spot(S) Been Treated In The Past?: has not been treated Location: Back Year Removed: 2018 0 = swallows foods/liquids without difficulty

## 2020-02-01 NOTE — ED ADULT TRIAGE NOTE - MODE OF ARRIVAL
[facility] MD Roberto Golden, MD Nancy Kelly, MD Prisca Borrero, CNP   Pati Hernandez, ANP  Progress Note    1/25/2020    SUBJECTIVE:     1/19:  Feeling little bit better today. No swelling. Denies complaint (-) sob/servin/cp/palp Appetite ok ROS otherwise unremarkable  1/20: No new complaint. \"Peeing a lot. \"  Ongoing abdominal discomfort at the surgical site, minimal pain with movement. No dyspnea on room air. No chest pain or palpitations. Appetite remains good. ROS otherwise unremarkable. 1/21: Lower abdominal pain. Otherwise status quo. Denies other complaint. Continues to [facility] a lot of urine. \"  1/22: seen earlier this afternoon. Upset over current clinical situation. Not taking a number of the meds prescribed then, though has begun taking them now. Urinating in \"hat\" though not able to get all of the urine. Denies swelling. No dyspnea. Severe fatigue. Poor appetite ,though drinking \"a lot\" of fluid. Bilateral low back pain does not localize to one side or the other. Diarrhea, loose to watery. No melena or hematochezia. 1/23: Emesis with attempting to swallow PhosLo tablets. No nausea, notes that after vomiting feels better. Otherwise actually feeling a little bit better today. Appetite remains good. Mild ankle swelling no S OB, SERVIN, chest pain or palpitations. Mildly \"sore\" at her abdominal surgical site. 1/24: In better spirits today. No new complaint. Take her medications a little more regularly. Good appetite good intake. 1/25 : stable vitals, on RA . Edema noted in LE . Appetite ok . PROBLEM LIST:    Active Problems:    Abscess  Resolved Problems:    * No resolved hospital problems.  *         DIET:    DIET LOW FIBER;     MEDS (scheduled):    sodium bicarbonate        ferrous sulfate  325 mg Oral BID WC    sodium chloride  20 mL Intravenous Once    calcium acetate  1,334 mg Oral TID WC    famotidine  20 mg Oral BID    22.4* 24.6*   MCV 77.8*  --  78.6*   *  --  575*     Recent Labs     01/23/20  0502 01/24/20  0420 01/25/20  0645    140 144   K 4.7 5.1* 4.1   * 110* 112*   CO2 15* 17* 16*   MG  --   --  1.5*   PHOS  --   --  7.0*   BUN 38* 36* 28*   CREATININE 13.0* 11.6* 9.1*       Lab Results   Component Value Date    LABPROT 0.1 01/23/2020    LABPROT 0.1 01/23/2020    LABPROT 1.4 (H) 01/18/2020    LABPROT 1.4 01/18/2020     CT abd/pelvis w/o contrast (summary):  1. Successful evacuation of fluid collections in the pelvis and at the   anterior abdominal wall. Indwelling pelvic drain now present. 2. Newly enlarged left kidney with adjacent stranding in the   perinephric space. Consider the possibility of pyelonephritis or   occult obstruction. Assessment  33 y.o. woman s/p c/s per OB w/ repair of small serosal tear by genl surg (consulted intraoperatively) 1/6 adm 1/13 w/ purulent drng from surgical wound, dx'd abd abscess,  S/p ex lap, ALISON, SBR, incidental appendectomy, supracervical hysterectomy, bilateral salpingectomy, with placement of intraperitoneal drain and retention sutures 1/14. Cr 0.6 --> 0.8 --> --> 8.9 mg/dL. 1. Acute kidney injury, etiology is likely multifactorial, due to combination of contrast-induced nephropathy, hemodynamic effect of transient hypotension, possibly evolving septicemia, and vancomycin toxicity. The rapid rise in creatinine strongly suggests acute tubular necrosis. UO is not recorded, though at least per her history she is nonoliguric. CT scan of the kidneys preop did not demonstrate hydronephrosis. The increased echogenicity seen on ultrasound 1/18 likely indicative of the severity of the injury. No hydronephrosis on that ultrasound. 2. Metabolic acidosis, non-anion gap, secondary to acute kidney injury  3.  Hyperphosphatemia, due to acute kidney injury    vanco stopped  U eos (-)  Urinary indices c/w pre-renal azotemia, though the urine sodium and chloride avidity is also consistent with contrast-induced nephropathy -- this is not pre-renal azotemia. CT results noted (see above) -- pyelo vs occult obstruction. Seems unlikely, but renal vein thrombosis can also render an enlarged kidney     Creatinine improving  Remains nonoliguric  MAG-3 renal scan consistent with ATN    Recommendations  1. Continue IV fluid resuscitation  2. Follow labs, UO  3. Avoid nephrotoxins. 4. Sodium bicarbonate tablets  5.  PhosLo tablets      Electronically signed by Sophia Velasquez MD on 1/25/2020 at 11:37 AM Walk in

## 2020-02-01 NOTE — ED PROVIDER NOTE - NS ED ROS FT
Eyes:  No visual changes, eye pain or discharge.  ENMT:  No hearing changes, pain, discharge or infections. No neck pain or stiffness.  Cardiac:  No chest pain, SOB or edema. No chest pain with exertion.  Respiratory:  +cough. +mild  respiratory distress. No hemoptysis. No history of asthma or RAD.  GI:  No nausea, vomiting, diarrhea or abdominal pain.  :  No dysuria, frequency or burning.  MS:  No myalgia, muscle weakness, joint pain or back pain.  Neuro:  No headache or weakness.  No LOC.  Skin:  No skin rash.   Endocrine: No history of thyroid disease or diabetes.

## 2020-02-01 NOTE — H&P ADULT - HISTORY OF PRESENT ILLNESS
59y old male with pmhx of COPD, HTN, thyroid cancer, s/p thyroidectomy presents to the ED complaining of shortness of breath and wheezing for past several days associated with coughing now progressively worse.  Pt was seen in ED 4 days ago for COPD exacerbation, discharged on oral  prednisone with no improvement. pt states worsening of symptoms today despite treatment with albuterol at home. pt denies leg swelling, chest/abdominal pain, n/v fever or chills

## 2020-02-01 NOTE — ED PROVIDER NOTE - PHYSICAL EXAMINATION
CONSTITUTIONAL: obese; well-nourished; in no acute distress.   SKIN: warm, dry  HEAD: Normocephalic; atraumatic.  EYES: PERRL, EOMI, normal sclera and conjunctiva   ENT: No nasal discharge; airway clear.  NECK: Supple; non tender.  CARD: S1, S2 normal; no murmurs, gallops, or rubs. Regular rate and rhythm.   RESP: +severe wheezing b/l. no rales or rhonchi. increased WOB.  ABD: soft ntnd  EXT: Normal ROM.  No clubbing, cyanosis or edema.   LYMPH: No acute cervical adenopathy.  NEURO: Alert, oriented, grossly unremarkable  PSYCH: Cooperative, appropriate.

## 2020-02-01 NOTE — H&P ADULT - ASSESSMENT
Diagnosis: COPD excercebation  Assesment/plan:  admit to med-surg  labs/ecg/c-xray  o2  respitory tx prn  iv steroids  pulmonary consult    Diagnosis; Hypomagnesia  Assesment/plan;  Replace magnesium  rpt mag level    Diagnosis; HTN                  High cholesterol                  hypothyroidism  Assesment/plan:  continue current tx  GI/DVT prophylaxis  monitor vss  monitor pt

## 2020-02-01 NOTE — H&P ADULT - NSICDXPASTMEDICALHX_GEN_ALL_CORE_FT
PAST MEDICAL HISTORY:  Borderline diabetes     COPD (chronic obstructive pulmonary disease)     HTN (hypertension)     Thyroid cancer

## 2020-02-01 NOTE — ED PROVIDER NOTE - CLINICAL SUMMARY MEDICAL DECISION MAKING FREE TEXT BOX
Patient presnts with worsening sob despite outpatient treatment, I will admit for further workup at this time he has improved in the ED, I will continue to monitor  patient updated and agree with the plan of care, when the patient moves in the slightest he becomes worse.

## 2020-02-01 NOTE — ED PROVIDER NOTE - PROGRESS NOTE DETAILS
amada - s/o to Dr. ernst pt still wheezing, agrees to admission. dr harper aware, will follow requested flu, trop, bnp.

## 2020-02-01 NOTE — H&P ADULT - ATTENDING COMMENTS
59y old male with pmhx of COPD, HTN, thyroid cancer, s/p thyroidectomy presents to the ED complaining of shortness of breath and wheezing for past several days associated with coughing now progressively worse.    1. COPD exacerbation: Cont IV solumedrol 40mg q12h, duonebs ATC. will assess respiratory status tomorrow prior to tapering. Cont symbicort/spiriva (pt takes advair, spiriva at home)  2. HTN: Cont metoprolol succinate 25 daily, pravastatin 20  3. Hypothyroidism, s/p thyroidectomy for thyroid CA: Cont synthroid 175    GI/DVT PPX

## 2020-02-01 NOTE — H&P ADULT - NSHPLABSRESULTS_GEN_ALL_CORE
13.9   18.55 )-----------( 269      ( 01 Feb 2020 07:11 )             42.0       02-01    141  |  101  |  21<H>  ----------------------------<  147<H>  3.9   |  24  |  0.8    Ca    9.1      01 Feb 2020 07:11  Mg     1.7     02-01    TPro  6.6  /  Alb  4.1  /  TBili  0.3  /  DBili  x   /  AST  11  /  ALT  11  /  AlkPhos  90  02-01          Magnesium, Serum: 1.7 mg/dL (02-01-20 @ 07:11)                  Lactate Trend

## 2020-02-01 NOTE — H&P ADULT - NSHPPHYSICALEXAM_GEN_ALL_CORE
Physical Examination:                CONSTITUTIONAL: axox3, obese, comfortable, NAD   	SKIN: warm, dry  	HEAD: Normocephalic; atraumatic.  	EYES: PERRL, EOMI, normal sclera and conjunctiva   	ENT: No nasal discharge; airway clear.  	NECK: Supple; non tender.  	CARD: S1, S2 normal, Regular rate and rhythm.   	RESP: b/l wheezing, no rales or rhonchi.  	ABD: soft, nontender, obese  	EXT: Normal ROM.  No clubbing, cyanosis or edema.   	NEURO: AXOX3, WNL, motor and sensory intact

## 2020-02-01 NOTE — ED PROVIDER NOTE - OBJECTIVE STATEMENT
pt is a 59 yom w/ COPD, HTN here for 5 days sob, wheezing, coughing not better with prednisone or albuterol. pt presented 5 days ago w/ similar symptoms, but pt states it's worsened since then. denies fever, leg swelling, abd pain

## 2020-02-02 LAB
ANION GAP SERPL CALC-SCNC: 13 MMOL/L — SIGNIFICANT CHANGE UP (ref 7–14)
BASOPHILS # BLD AUTO: 0.03 K/UL — SIGNIFICANT CHANGE UP (ref 0–0.2)
BASOPHILS NFR BLD AUTO: 0.2 % — SIGNIFICANT CHANGE UP (ref 0–1)
BUN SERPL-MCNC: 26 MG/DL — HIGH (ref 10–20)
CALCIUM SERPL-MCNC: 8.9 MG/DL — SIGNIFICANT CHANGE UP (ref 8.5–10.1)
CHLORIDE SERPL-SCNC: 102 MMOL/L — SIGNIFICANT CHANGE UP (ref 98–110)
CO2 SERPL-SCNC: 25 MMOL/L — SIGNIFICANT CHANGE UP (ref 17–32)
CREAT SERPL-MCNC: 0.7 MG/DL — SIGNIFICANT CHANGE UP (ref 0.7–1.5)
EOSINOPHIL # BLD AUTO: 0.01 K/UL — SIGNIFICANT CHANGE UP (ref 0–0.7)
EOSINOPHIL NFR BLD AUTO: 0.1 % — SIGNIFICANT CHANGE UP (ref 0–8)
GLUCOSE BLDC GLUCOMTR-MCNC: 124 MG/DL — HIGH (ref 70–99)
GLUCOSE BLDC GLUCOMTR-MCNC: 143 MG/DL — HIGH (ref 70–99)
GLUCOSE BLDC GLUCOMTR-MCNC: 157 MG/DL — HIGH (ref 70–99)
GLUCOSE BLDC GLUCOMTR-MCNC: 161 MG/DL — HIGH (ref 70–99)
GLUCOSE BLDC GLUCOMTR-MCNC: 173 MG/DL — HIGH (ref 70–99)
GLUCOSE SERPL-MCNC: 141 MG/DL — HIGH (ref 70–99)
HCT VFR BLD CALC: 38.1 % — LOW (ref 42–52)
HGB BLD-MCNC: 12.4 G/DL — LOW (ref 14–18)
IMM GRANULOCYTES NFR BLD AUTO: 1 % — HIGH (ref 0.1–0.3)
LYMPHOCYTES # BLD AUTO: 1.24 K/UL — SIGNIFICANT CHANGE UP (ref 1.2–3.4)
LYMPHOCYTES # BLD AUTO: 7.4 % — LOW (ref 20.5–51.1)
MCHC RBC-ENTMCNC: 28.6 PG — SIGNIFICANT CHANGE UP (ref 27–31)
MCHC RBC-ENTMCNC: 32.5 G/DL — SIGNIFICANT CHANGE UP (ref 32–37)
MCV RBC AUTO: 88 FL — SIGNIFICANT CHANGE UP (ref 80–94)
MONOCYTES # BLD AUTO: 1.56 K/UL — HIGH (ref 0.1–0.6)
MONOCYTES NFR BLD AUTO: 9.4 % — HIGH (ref 1.7–9.3)
NEUTROPHILS # BLD AUTO: 13.65 K/UL — HIGH (ref 1.4–6.5)
NEUTROPHILS NFR BLD AUTO: 81.9 % — HIGH (ref 42.2–75.2)
NRBC # BLD: 0 /100 WBCS — SIGNIFICANT CHANGE UP (ref 0–0)
PLATELET # BLD AUTO: 256 K/UL — SIGNIFICANT CHANGE UP (ref 130–400)
POTASSIUM SERPL-MCNC: 4.2 MMOL/L — SIGNIFICANT CHANGE UP (ref 3.5–5)
POTASSIUM SERPL-SCNC: 4.2 MMOL/L — SIGNIFICANT CHANGE UP (ref 3.5–5)
RBC # BLD: 4.33 M/UL — LOW (ref 4.7–6.1)
RBC # FLD: 13.3 % — SIGNIFICANT CHANGE UP (ref 11.5–14.5)
SODIUM SERPL-SCNC: 140 MMOL/L — SIGNIFICANT CHANGE UP (ref 135–146)
WBC # BLD: 16.65 K/UL — HIGH (ref 4.8–10.8)
WBC # FLD AUTO: 16.65 K/UL — HIGH (ref 4.8–10.8)

## 2020-02-02 PROCEDURE — 99233 SBSQ HOSP IP/OBS HIGH 50: CPT

## 2020-02-02 RX ORDER — HYDRALAZINE HCL 50 MG
50 TABLET ORAL ONCE
Refills: 0 | Status: COMPLETED | OUTPATIENT
Start: 2020-02-02 | End: 2020-02-02

## 2020-02-02 RX ORDER — IPRATROPIUM/ALBUTEROL SULFATE 18-103MCG
3 AEROSOL WITH ADAPTER (GRAM) INHALATION EVERY 6 HOURS
Refills: 0 | Status: DISCONTINUED | OUTPATIENT
Start: 2020-02-02 | End: 2020-02-07

## 2020-02-02 RX ADMIN — Medication 25 MILLIGRAM(S): at 05:18

## 2020-02-02 RX ADMIN — Medication 40 MILLIGRAM(S): at 05:23

## 2020-02-02 RX ADMIN — Medication 1: at 17:03

## 2020-02-02 RX ADMIN — TIOTROPIUM BROMIDE 1 CAPSULE(S): 18 CAPSULE ORAL; RESPIRATORY (INHALATION) at 11:11

## 2020-02-02 RX ADMIN — BUDESONIDE AND FORMOTEROL FUMARATE DIHYDRATE 2 PUFF(S): 160; 4.5 AEROSOL RESPIRATORY (INHALATION) at 11:10

## 2020-02-02 RX ADMIN — Medication 50 MILLIGRAM(S): at 16:34

## 2020-02-02 RX ADMIN — Medication 175 MICROGRAM(S): at 05:18

## 2020-02-02 RX ADMIN — Medication 3 MILLILITER(S): at 00:46

## 2020-02-02 RX ADMIN — BUDESONIDE AND FORMOTEROL FUMARATE DIHYDRATE 2 PUFF(S): 160; 4.5 AEROSOL RESPIRATORY (INHALATION) at 21:42

## 2020-02-02 RX ADMIN — Medication 1: at 12:04

## 2020-02-02 RX ADMIN — Medication 3 MILLILITER(S): at 13:48

## 2020-02-02 RX ADMIN — MONTELUKAST 10 MILLIGRAM(S): 4 TABLET, CHEWABLE ORAL at 11:12

## 2020-02-02 RX ADMIN — Medication 60 MILLIGRAM(S): at 18:53

## 2020-02-02 RX ADMIN — Medication 3 MILLILITER(S): at 19:47

## 2020-02-02 RX ADMIN — PANTOPRAZOLE SODIUM 40 MILLIGRAM(S): 20 TABLET, DELAYED RELEASE ORAL at 05:18

## 2020-02-02 NOTE — PROGRESS NOTE ADULT - SUBJECTIVE AND OBJECTIVE BOX
JOAQUIN HERNANDEZ  59y, Male  Allergy: No Known Allergies    Hospital Day: 1d    Patient seen and examined earlier today. No acute events overnight.    PMH/PSH:  PAST MEDICAL & SURGICAL HISTORY:  Borderline diabetes  COPD (chronic obstructive pulmonary disease)  HTN (hypertension)  Thyroid cancer  H/O thyroidectomy    VITALS:  T(F): 96.8 (02-02-20 @ 05:33), Max: 97.5 (02-01-20 @ 11:00)  HR: 101 (02-02-20 @ 05:33)  BP: 140/91 (02-02-20 @ 05:33) (140/91 - 171/97)  RR: 16 (02-02-20 @ 05:33)  SpO2: 95% (02-01-20 @ 12:56)    TESTS & MEASUREMENTS:  Weight (Kg): 124.7 (02-01-20 @ 05:39)  BMI (kg/m2): 38.3 (02-01)                        12.4   16.65 )-----------( 256      ( 02 Feb 2020 06:00 )             38.1     02-01    141  |  101  |  21<H>  ----------------------------<  147<H>  3.9   |  24  |  0.8    Ca    9.1      01 Feb 2020 07:11  Mg     1.7     02-01    TPro  6.6  /  Alb  4.1  /  TBili  0.3  /  DBili  x   /  AST  11  /  ALT  11  /  AlkPhos  90  02-01    LIVER FUNCTIONS - ( 01 Feb 2020 07:11 )  Alb: 4.1 g/dL / Pro: 6.6 g/dL / ALK PHOS: 90 U/L / ALT: 11 U/L / AST: 11 U/L / GGT: x           RECENT DIAGNOSTIC ORDERS:  Hepatitis C Antibody Test: AM Sched. Collection: 02-Feb-2020 04:30 (02-01-20 @ 12:07)  Diet, DASH/TLC:   Sodium & Cholesterol Restricted (02-01-20 @ 12:13)  Basic Metabolic Panel: AM Sched. Collection: 02-Feb-2020 04:30 (02-01-20 @ 12:13)  Hemoglobin A1C with Mean Plasma Glucose: AM Sched. Collection: 02-Feb-2020 04:30 (02-01-20 @ 15:28)    MEDICATIONS:  MEDICATIONS  (STANDING):  albuterol/ipratropium for Nebulization. 3 milliLiter(s) Nebulizer every 6 hours  budesonide 160 MICROgram(s)/formoterol 4.5 MICROgram(s) Inhaler 2 Puff(s) Inhalation two times a day  dextrose 5%. 1000 milliLiter(s) (50 mL/Hr) IV Continuous <Continuous>  dextrose 50% Injectable 12.5 Gram(s) IV Push once  dextrose 50% Injectable 25 Gram(s) IV Push once  dextrose 50% Injectable 25 Gram(s) IV Push once  insulin lispro (HumaLOG) corrective regimen sliding scale   SubCutaneous three times a day before meals  levothyroxine 175 MICROGram(s) Oral daily  methylPREDNISolone sodium succinate Injectable 60 milliGRAM(s) IV Push every 12 hours  metoprolol succinate ER 25 milliGRAM(s) Oral daily  montelukast 10 milliGRAM(s) Oral daily  pantoprazole    Tablet 40 milliGRAM(s) Oral before breakfast  tiotropium 18 MICROgram(s) Capsule 1 Capsule(s) Inhalation daily    MEDICATIONS  (PRN):  ALBUTerol    90 MICROgram(s) HFA Inhaler 2 Puff(s) Inhalation every 6 hours PRN Shortness of Breath and/or Wheezing  albuterol/ipratropium for Nebulization 3 milliLiter(s) Nebulizer every 6 hours PRN Shortness of Breath and/or Wheezing  chlorhexidine 4% Liquid 1 Application(s) Topical daily PRN skin  dextrose 40% Gel 15 Gram(s) Oral once PRN Blood Glucose LESS THAN 70 milliGRAM(s)/deciliter  glucagon  Injectable 1 milliGRAM(s) IntraMuscular once PRN Glucose LESS THAN 70 milligrams/deciliter    HOME MEDICATIONS:  Advair Diskus (11-23)  levothyroxine 175 mcg (0.175 mg) oral tablet (11-23)  omeprazole 40 mg oral delayed release capsule (11-23)  pravastatin 40 mg oral tablet (11-23)  predniSONE 10 mg oral tablet (11-23)  Singulair 10 mg oral tablet (11-23)  Spiriva 18 mcg inhalation capsule (11-23)  Toprol-XL 25 mg oral tablet, extended release (11-23)  Ventolin HFA 90 mcg/inh inhalation aerosol (11-23)    REVIEW OF SYSTEMS:  All other review of systems is negative unless indicated above.     PHYSICAL EXAM:  GENERAL: NAD  HEENT: No Swelling  CHEST/LUNG: b/l wheeze  HEART: RRR, No murmurs  ABDOMEN: Soft, Bowel sounds present  EXTREMITIES:  No clubbing

## 2020-02-02 NOTE — PROGRESS NOTE ADULT - ASSESSMENT
59y old male with pmhx of COPD, HTN, thyroid cancer, s/p thyroidectomy presents to the ED complaining of shortness of breath and wheezing for past several days associated with coughing now progressively worse.    1. COPD exacerbation: Cont IV solumedrol 40mg q12h, duonebs ATC. will assess respiratory status each day prior to tapering. Cont symbicort/spiriva (pt takes advair, spiriva at home)  2. HTN: Cont metoprolol succinate 25 daily, pravastatin 20  3. Hypothyroidism, s/p thyroidectomy for thyroid CA: Cont synthroid 175    GI/DVT PPX .

## 2020-02-03 LAB
ANION GAP SERPL CALC-SCNC: 13 MMOL/L — SIGNIFICANT CHANGE UP (ref 7–14)
BUN SERPL-MCNC: 28 MG/DL — HIGH (ref 10–20)
CALCIUM SERPL-MCNC: 9.5 MG/DL — SIGNIFICANT CHANGE UP (ref 8.5–10.1)
CHLORIDE SERPL-SCNC: 99 MMOL/L — SIGNIFICANT CHANGE UP (ref 98–110)
CO2 SERPL-SCNC: 27 MMOL/L — SIGNIFICANT CHANGE UP (ref 17–32)
CREAT SERPL-MCNC: 1.1 MG/DL — SIGNIFICANT CHANGE UP (ref 0.7–1.5)
ESTIMATED AVERAGE GLUCOSE: 128 MG/DL — HIGH (ref 68–114)
GLUCOSE BLDC GLUCOMTR-MCNC: 102 MG/DL — HIGH (ref 70–99)
GLUCOSE BLDC GLUCOMTR-MCNC: 111 MG/DL — HIGH (ref 70–99)
GLUCOSE BLDC GLUCOMTR-MCNC: 113 MG/DL — HIGH (ref 70–99)
GLUCOSE BLDC GLUCOMTR-MCNC: 172 MG/DL — HIGH (ref 70–99)
GLUCOSE SERPL-MCNC: 152 MG/DL — HIGH (ref 70–99)
GRAM STN FLD: SIGNIFICANT CHANGE UP
HBA1C BLD-MCNC: 6.1 % — HIGH (ref 4–5.6)
HCT VFR BLD CALC: 40.5 % — LOW (ref 42–52)
HCV AB S/CO SERPL IA: 0.44 S/CO — SIGNIFICANT CHANGE UP (ref 0–0.99)
HCV AB SERPL-IMP: SIGNIFICANT CHANGE UP
HGB BLD-MCNC: 13.3 G/DL — LOW (ref 14–18)
MCHC RBC-ENTMCNC: 28.6 PG — SIGNIFICANT CHANGE UP (ref 27–31)
MCHC RBC-ENTMCNC: 32.8 G/DL — SIGNIFICANT CHANGE UP (ref 32–37)
MCV RBC AUTO: 87.1 FL — SIGNIFICANT CHANGE UP (ref 80–94)
NRBC # BLD: 0 /100 WBCS — SIGNIFICANT CHANGE UP (ref 0–0)
PLATELET # BLD AUTO: 299 K/UL — SIGNIFICANT CHANGE UP (ref 130–400)
POTASSIUM SERPL-MCNC: 4.9 MMOL/L — SIGNIFICANT CHANGE UP (ref 3.5–5)
POTASSIUM SERPL-SCNC: 4.9 MMOL/L — SIGNIFICANT CHANGE UP (ref 3.5–5)
RBC # BLD: 4.65 M/UL — LOW (ref 4.7–6.1)
RBC # FLD: 13.2 % — SIGNIFICANT CHANGE UP (ref 11.5–14.5)
SODIUM SERPL-SCNC: 139 MMOL/L — SIGNIFICANT CHANGE UP (ref 135–146)
SPECIMEN SOURCE: SIGNIFICANT CHANGE UP
WBC # BLD: 20.16 K/UL — HIGH (ref 4.8–10.8)
WBC # FLD AUTO: 20.16 K/UL — HIGH (ref 4.8–10.8)

## 2020-02-03 PROCEDURE — 99233 SBSQ HOSP IP/OBS HIGH 50: CPT

## 2020-02-03 RX ORDER — AZITHROMYCIN 500 MG/1
500 TABLET, FILM COATED ORAL DAILY
Refills: 0 | Status: COMPLETED | OUTPATIENT
Start: 2020-02-03 | End: 2020-02-05

## 2020-02-03 RX ADMIN — PANTOPRAZOLE SODIUM 40 MILLIGRAM(S): 20 TABLET, DELAYED RELEASE ORAL at 05:31

## 2020-02-03 RX ADMIN — AZITHROMYCIN 500 MILLIGRAM(S): 500 TABLET, FILM COATED ORAL at 21:05

## 2020-02-03 RX ADMIN — Medication 40 MILLIGRAM(S): at 21:05

## 2020-02-03 RX ADMIN — TIOTROPIUM BROMIDE 1 CAPSULE(S): 18 CAPSULE ORAL; RESPIRATORY (INHALATION) at 07:40

## 2020-02-03 RX ADMIN — Medication 3 MILLILITER(S): at 07:40

## 2020-02-03 RX ADMIN — MONTELUKAST 10 MILLIGRAM(S): 4 TABLET, CHEWABLE ORAL at 11:31

## 2020-02-03 RX ADMIN — Medication 3 MILLILITER(S): at 13:46

## 2020-02-03 RX ADMIN — Medication 1: at 11:32

## 2020-02-03 RX ADMIN — Medication 60 MILLIGRAM(S): at 05:31

## 2020-02-03 RX ADMIN — BUDESONIDE AND FORMOTEROL FUMARATE DIHYDRATE 2 PUFF(S): 160; 4.5 AEROSOL RESPIRATORY (INHALATION) at 08:15

## 2020-02-03 RX ADMIN — Medication 175 MICROGRAM(S): at 05:31

## 2020-02-03 RX ADMIN — Medication 25 MILLIGRAM(S): at 05:31

## 2020-02-03 RX ADMIN — Medication 3 MILLILITER(S): at 20:46

## 2020-02-03 RX ADMIN — BUDESONIDE AND FORMOTEROL FUMARATE DIHYDRATE 2 PUFF(S): 160; 4.5 AEROSOL RESPIRATORY (INHALATION) at 20:46

## 2020-02-03 NOTE — CONSULT NOTE ADULT - ASSESSMENT
IMPRESSION    Asthma Exacerbation   ACOS ?   Pulmonary nodule   High Risk CATHERINE (STOPBANG 7)  Former 	Smoker    PLAN    Solumedrol 60 IV q8   Azithromycin PO   Repeat Blood work today   F/U cultures  Duonebs q4 and prn   Resume Home inhalers on discharge  DVT PPX  OOB to chair  Outpatient PFTs  Outpatient sleep study

## 2020-02-03 NOTE — PROGRESS NOTE ADULT - ASSESSMENT
59y old male with pmhx of COPD, HTN, thyroid cancer, s/p thyroidectomy presents to the ED complaining of shortness of breath and wheezing for past several days associated with coughing now progressively worse.    1. COPD exacerbation: Cont IV solumedrol 60mg q12h, duonebs ATC. will assess respiratory status each day prior to tapering. Cont symbicort/spiriva (pt takes advair, spiriva at home)  2. HTN: Cont metoprolol succinate 25 daily, pravastatin 20  3. Hypothyroidism, s/p thyroidectomy for thyroid CA: Cont synthroid 175.     GI/DVT PPX .     #Progress Note Handoff  Pending (specify): Improvement of respiratory status  Family discussion: d/w pt regarding continuing IV steroids  Disposition: Home 59y old male with pmhx of COPD, HTN, thyroid cancer, s/p thyroidectomy presents to the ED complaining of shortness of breath and wheezing for past several days associated with coughing now progressively worse.    1. COPD exacerbation: Increase to solumedrol 60 q8h, duonebs ATC. Start azithromycin 500mg qd x 3 days will assess respiratory status each day prior to tapering. Cont symbicort/spiriva (pt takes advair, spiriva at home)  2. HTN: Cont metoprolol succinate 25 daily, pravastatin 20  3. Hypothyroidism, s/p thyroidectomy for thyroid CA: Cont synthroid 175.     GI/DVT PPX .     #Progress Note Handoff  Pending (specify): Improvement of respiratory status  Family discussion: d/w pt regarding continuing IV steroids  Disposition: Home

## 2020-02-03 NOTE — CONSULT NOTE ADULT - SUBJECTIVE AND OBJECTIVE BOX
Patient is a 59y old  Male who presents with a chief complaint of SOB (02 Feb 2020 10:56)      HPI:  59y old former smoker male with pmhx of asthma , COPD? not on home oxygen  , HTN, thyroid cancer, s/p thyroidectomy presents to the ED complaining of shortness of breath and wheezing for past several days associated with coughing now progressively worse.  Pt was seen in ED 4 days ago , discharged on oral  prednisone with no improvement. patient reports productive cough yellowish green phlegm , no hemoptysis, SOB worse on exertion . No improvement with albuterol nebs and advair use at home .Denies chest pain/palpitations/headache/significant weight loss or weight gain   Home inhalers- advair , spiriva , albuterol    PAST MEDICAL & SURGICAL HISTORY:  Borderline diabetes  COPD (chronic obstructive pulmonary disease)  HTN (hypertension)  Thyroid cancer  H/O thyroidectomy      SOCIAL HX:   Smoking     former smoker (quit >7 years ago)                     ETOH   occasional                         Other      FAMILY HISTORY:  No pertinent family history in first degree relatives  .  No cardiovascular or pulmonary family history     REVIEW OF SYSTEMS:    CONSTITUTIONAL:   no fever   + chills.  no weight gain   no weight loss    EYES:   no discharge,   no visual changes.    ENT:   Ears: no ear pain and no hearing problems.  Nose: no nasal congestion and + nasal drainage.  Mouth/Throat: no dysphagia,  no hoarseness and no throat pain.  Neck: no lumps, no pain, no stiffness and no swollen glands.    CARDIOVASCULAR:   no chest pain,   no swelling  no palpitations  no syncope    RESPIRATORY:  + SOB,  + wheezing ,  + respiratory difficulty  + yellowish green sputum production    GASTROINTESTINAL:   no abdominal pain,   no constipation,   no diarrhea,   no vomiting.    GENITOURINARY:  no dysuria,   no hematuria.    MUSCULOSKELETAL:   no back pain,   no musculoskeletal pain,  no weakness.    SKIN:   no jaundice,    no rashes.    NEURO:   no loss of consciousness,   no headache,   no weakness.    PSYCHIATRIC:   no known mental health issues  no anxiety  no depression    ALLERGIC/IMMUNOLOGIC:   No active allergic or immunologic issues      Allergies    No Known Allergies    Intolerances          PHYSICAL EXAM  Vital Signs Last 24 Hrs  T(C): 36.1 (03 Feb 2020 04:55), Max: 36.6 (02 Feb 2020 13:55)  T(F): 97 (03 Feb 2020 04:55), Max: 97.8 (02 Feb 2020 13:55)  HR: 77 (03 Feb 2020 04:55) (77 - 89)  BP: 168/86 (03 Feb 2020 04:55) (156/80 - 168/90)  BP(mean): --  RR: 18 (03 Feb 2020 04:55) (18 - 95)  SpO2: 96% on RA    CONSTITUTIONAL:  Well nourished.  NAD    ENT:   Mallampati class 4  Airway patent,   No thrush    EYES:   Clear bilaterally,   pupils equal,   round and reactive to light.    CARDIAC:   Normal rate,   regular rhythm.    no edema      CAROTID:   normal systolic impulse  no bruits    RESPIRATORY:   +b/l diffuse wheezing  normal chest expansion  Not tachypneic,  No use of accessory muscles    GASTROINTESTINAL:  Abdomen soft,   non-tender,   no guarding,   + BS    GENITOURINARY  normal genitalia for sex  no edema    MUSCULOSKELETAL:   range of motion is not limited,  no clubbing, cyanosis    NEUROLOGICAL:   Alert and oriented   no motor deficits.  pertinent DTRs normal    SKIN:   Skin normal color for race,   No evidence of rash.    PSYCHIATRIC:   normal mood and affect.   no apparent risk to self or others.    HEME LYMPH:   No cervical  lymphadenopathy.            LABS:                          12.4   16.65 )-----------( 256      ( 02 Feb 2020 06:00 )             38.1                                               02-02    140  |  102  |  26<H>  ----------------------------<  141<H>  4.2   |  25  |  0.7    Ca    8.9      02 Feb 2020 06:00                                                                                                                                      Culture - Sputum (collected 02 Feb 2020 16:53)  Source: .Sputum Sputum  Gram Stain (03 Feb 2020 00:56):    Rare polymorphonuclear leukocytes per low power field    Rare Squamous epithelial cells per low power field    Numerous Gram Variable Cocci seen per oil power field                                                    MEDICATIONS  (STANDING):  albuterol/ipratropium for Nebulization. 3 milliLiter(s) Nebulizer every 6 hours  budesonide 160 MICROgram(s)/formoterol 4.5 MICROgram(s) Inhaler 2 Puff(s) Inhalation two times a day  dextrose 5%. 1000 milliLiter(s) (50 mL/Hr) IV Continuous <Continuous>  dextrose 50% Injectable 12.5 Gram(s) IV Push once  dextrose 50% Injectable 25 Gram(s) IV Push once  dextrose 50% Injectable 25 Gram(s) IV Push once  insulin lispro (HumaLOG) corrective regimen sliding scale   SubCutaneous three times a day before meals  levothyroxine 175 MICROGram(s) Oral daily  methylPREDNISolone sodium succinate Injectable 60 milliGRAM(s) IV Push every 12 hours  metoprolol succinate ER 25 milliGRAM(s) Oral daily  montelukast 10 milliGRAM(s) Oral daily  pantoprazole    Tablet 40 milliGRAM(s) Oral before breakfast  tiotropium 18 MICROgram(s) Capsule 1 Capsule(s) Inhalation daily    MEDICATIONS  (PRN):  ALBUTerol    90 MICROgram(s) HFA Inhaler 2 Puff(s) Inhalation every 6 hours PRN Shortness of Breath and/or Wheezing  albuterol/ipratropium for Nebulization 3 milliLiter(s) Nebulizer every 6 hours PRN Shortness of Breath and/or Wheezing  chlorhexidine 4% Liquid 1 Application(s) Topical daily PRN skin  dextrose 40% Gel 15 Gram(s) Oral once PRN Blood Glucose LESS THAN 70 milliGRAM(s)/deciliter  glucagon  Injectable 1 milliGRAM(s) IntraMuscular once PRN Glucose LESS THAN 70 milligrams/deciliter      X-Rays reviewed: < from: Xray Chest 2 Views PA/Lat (02.01.20 @ 06:45) >  No radiographic evidence of acute cardiopulmonary disease.    Stable enlarged cardiac silhouette.    < end of copied text >    < from: CT Angio Chest Dissection Protocol (01.09.19 @ 16:24) >  1.  No evidence of an aortic dissection or intracranial hematoma.    2.  No CT evidence of an acute intrathoracic or abdominopelvic   abnormality.    3.  Few scattered subcentimeter bibasilar pulmonary nodules, new since   March 2018. Follow-up per oncologic protocol.    < end of copied text >      CXR interpreted by me:

## 2020-02-03 NOTE — CONSULT NOTE ADULT - ATTENDING COMMENTS
Attending Statement: I have personally performed a face to face diagnostic evaluation on this patient. The patient is suffering from Asthma Exacerbation. I have reviewed the above note and agree with the history, exam and suggestions for care, except as I have noted in the text.

## 2020-02-03 NOTE — PROGRESS NOTE ADULT - SUBJECTIVE AND OBJECTIVE BOX
JOAQUIN HERNANDEZ  59y, Male  Allergy: No Known Allergies    Hospital Day: 2d    Patient seen and examined earlier today. No acute events overnight.    PMH/PSH:  PAST MEDICAL & SURGICAL HISTORY:  Borderline diabetes  COPD (chronic obstructive pulmonary disease)  HTN (hypertension)  Thyroid cancer  H/O thyroidectomy    VITALS:  T(F): 97 (02-03-20 @ 04:55), Max: 97.8 (02-02-20 @ 13:55)  HR: 77 (02-03-20 @ 04:55)  BP: 168/86 (02-03-20 @ 04:55) (156/80 - 168/90)  RR: 18 (02-03-20 @ 04:55)  SpO2: 96% (02-02-20 @ 16:40)    TESTS & MEASUREMENTS:  Weight (Kg):   BMI (kg/m2): 38.3 (02-01)                          12.4   16.65 )-----------( 256      ( 02 Feb 2020 06:00 )             38.1       02-02    140  |  102  |  26<H>  ----------------------------<  141<H>  4.2   |  25  |  0.7    Ca    8.9      02 Feb 2020 06:00    Culture - Sputum (collected 02-02-20 @ 16:53)  Source: .Sputum Sputum  Gram Stain (02-03-20 @ 00:56):    Rare polymorphonuclear leukocytes per low power field    Rare Squamous epithelial cells per low power field    Numerous Gram Variable Cocci seen per oil power field    MEDICATIONS:  MEDICATIONS  (STANDING):  albuterol/ipratropium for Nebulization. 3 milliLiter(s) Nebulizer every 6 hours  budesonide 160 MICROgram(s)/formoterol 4.5 MICROgram(s) Inhaler 2 Puff(s) Inhalation two times a day  dextrose 5%. 1000 milliLiter(s) (50 mL/Hr) IV Continuous <Continuous>  dextrose 50% Injectable 12.5 Gram(s) IV Push once  dextrose 50% Injectable 25 Gram(s) IV Push once  dextrose 50% Injectable 25 Gram(s) IV Push once  insulin lispro (HumaLOG) corrective regimen sliding scale   SubCutaneous three times a day before meals  levothyroxine 175 MICROGram(s) Oral daily  methylPREDNISolone sodium succinate Injectable 60 milliGRAM(s) IV Push every 12 hours  metoprolol succinate ER 25 milliGRAM(s) Oral daily  montelukast 10 milliGRAM(s) Oral daily  pantoprazole    Tablet 40 milliGRAM(s) Oral before breakfast  tiotropium 18 MICROgram(s) Capsule 1 Capsule(s) Inhalation daily    MEDICATIONS  (PRN):  ALBUTerol    90 MICROgram(s) HFA Inhaler 2 Puff(s) Inhalation every 6 hours PRN Shortness of Breath and/or Wheezing  albuterol/ipratropium for Nebulization 3 milliLiter(s) Nebulizer every 6 hours PRN Shortness of Breath and/or Wheezing  chlorhexidine 4% Liquid 1 Application(s) Topical daily PRN skin  dextrose 40% Gel 15 Gram(s) Oral once PRN Blood Glucose LESS THAN 70 milliGRAM(s)/deciliter  glucagon  Injectable 1 milliGRAM(s) IntraMuscular once PRN Glucose LESS THAN 70 milligrams/deciliter    HOME MEDICATIONS:  Advair Diskus (11-23)  levothyroxine 175 mcg (0.175 mg) oral tablet (11-23)  omeprazole 40 mg oral delayed release capsule (11-23)  pravastatin 40 mg oral tablet (11-23)  predniSONE 10 mg oral tablet (11-23)  Singulair 10 mg oral tablet (11-23)  Spiriva 18 mcg inhalation capsule (11-23)  Toprol-XL 25 mg oral tablet, extended release (11-23)  Ventolin HFA 90 mcg/inh inhalation aerosol (11-23)      REVIEW OF SYSTEMS:  All other review of systems is negative unless indicated above.     PHYSICAL EXAM:  GENERAL: NAD  HEENT: No Swelling  CHEST/LUNG: Good air entry, No wheezing  HEART: RRR, No murmurs  ABDOMEN: Soft, Bowel sounds present  EXTREMITIES:  No clubbing

## 2020-02-04 LAB
ANION GAP SERPL CALC-SCNC: 16 MMOL/L — HIGH (ref 7–14)
BUN SERPL-MCNC: 28 MG/DL — HIGH (ref 10–20)
CALCIUM SERPL-MCNC: 9.3 MG/DL — SIGNIFICANT CHANGE UP (ref 8.5–10.1)
CHLORIDE SERPL-SCNC: 94 MMOL/L — LOW (ref 98–110)
CO2 SERPL-SCNC: 26 MMOL/L — SIGNIFICANT CHANGE UP (ref 17–32)
CREAT SERPL-MCNC: 0.8 MG/DL — SIGNIFICANT CHANGE UP (ref 0.7–1.5)
CULTURE RESULTS: SIGNIFICANT CHANGE UP
GLUCOSE BLDC GLUCOMTR-MCNC: 109 MG/DL — HIGH (ref 70–99)
GLUCOSE BLDC GLUCOMTR-MCNC: 138 MG/DL — HIGH (ref 70–99)
GLUCOSE BLDC GLUCOMTR-MCNC: 173 MG/DL — HIGH (ref 70–99)
GLUCOSE BLDC GLUCOMTR-MCNC: 269 MG/DL — HIGH (ref 70–99)
GLUCOSE SERPL-MCNC: 143 MG/DL — HIGH (ref 70–99)
HCT VFR BLD CALC: 43.5 % — SIGNIFICANT CHANGE UP (ref 42–52)
HGB BLD-MCNC: 13.9 G/DL — LOW (ref 14–18)
MCHC RBC-ENTMCNC: 28 PG — SIGNIFICANT CHANGE UP (ref 27–31)
MCHC RBC-ENTMCNC: 32 G/DL — SIGNIFICANT CHANGE UP (ref 32–37)
MCV RBC AUTO: 87.5 FL — SIGNIFICANT CHANGE UP (ref 80–94)
NRBC # BLD: 0 /100 WBCS — SIGNIFICANT CHANGE UP (ref 0–0)
PLATELET # BLD AUTO: 324 K/UL — SIGNIFICANT CHANGE UP (ref 130–400)
POTASSIUM SERPL-MCNC: 5.1 MMOL/L — HIGH (ref 3.5–5)
POTASSIUM SERPL-SCNC: 5.1 MMOL/L — HIGH (ref 3.5–5)
RBC # BLD: 4.97 M/UL — SIGNIFICANT CHANGE UP (ref 4.7–6.1)
RBC # FLD: 13.2 % — SIGNIFICANT CHANGE UP (ref 11.5–14.5)
SODIUM SERPL-SCNC: 136 MMOL/L — SIGNIFICANT CHANGE UP (ref 135–146)
SPECIMEN SOURCE: SIGNIFICANT CHANGE UP
WBC # BLD: 18.82 K/UL — HIGH (ref 4.8–10.8)
WBC # FLD AUTO: 18.82 K/UL — HIGH (ref 4.8–10.8)

## 2020-02-04 PROCEDURE — 99233 SBSQ HOSP IP/OBS HIGH 50: CPT

## 2020-02-04 RX ORDER — AMLODIPINE BESYLATE 2.5 MG/1
10 TABLET ORAL DAILY
Refills: 0 | Status: DISCONTINUED | OUTPATIENT
Start: 2020-02-05 | End: 2020-02-07

## 2020-02-04 RX ORDER — AMLODIPINE BESYLATE 2.5 MG/1
10 TABLET ORAL ONCE
Refills: 0 | Status: COMPLETED | OUTPATIENT
Start: 2020-02-04 | End: 2020-02-04

## 2020-02-04 RX ORDER — HYDRALAZINE HCL 50 MG
50 TABLET ORAL ONCE
Refills: 0 | Status: COMPLETED | OUTPATIENT
Start: 2020-02-04 | End: 2020-02-04

## 2020-02-04 RX ADMIN — BUDESONIDE AND FORMOTEROL FUMARATE DIHYDRATE 2 PUFF(S): 160; 4.5 AEROSOL RESPIRATORY (INHALATION) at 20:17

## 2020-02-04 RX ADMIN — Medication 40 MILLIGRAM(S): at 21:08

## 2020-02-04 RX ADMIN — Medication 3 MILLILITER(S): at 20:29

## 2020-02-04 RX ADMIN — Medication 40 MILLIGRAM(S): at 05:43

## 2020-02-04 RX ADMIN — Medication 40 MILLIGRAM(S): at 15:07

## 2020-02-04 RX ADMIN — AMLODIPINE BESYLATE 10 MILLIGRAM(S): 2.5 TABLET ORAL at 22:35

## 2020-02-04 RX ADMIN — Medication 3 MILLILITER(S): at 14:01

## 2020-02-04 RX ADMIN — MONTELUKAST 10 MILLIGRAM(S): 4 TABLET, CHEWABLE ORAL at 12:07

## 2020-02-04 RX ADMIN — Medication 50 MILLIGRAM(S): at 15:12

## 2020-02-04 RX ADMIN — Medication 3: at 12:09

## 2020-02-04 RX ADMIN — PANTOPRAZOLE SODIUM 40 MILLIGRAM(S): 20 TABLET, DELAYED RELEASE ORAL at 06:35

## 2020-02-04 RX ADMIN — BUDESONIDE AND FORMOTEROL FUMARATE DIHYDRATE 2 PUFF(S): 160; 4.5 AEROSOL RESPIRATORY (INHALATION) at 09:48

## 2020-02-04 RX ADMIN — Medication 25 MILLIGRAM(S): at 05:43

## 2020-02-04 RX ADMIN — Medication 175 MICROGRAM(S): at 05:43

## 2020-02-04 RX ADMIN — TIOTROPIUM BROMIDE 1 CAPSULE(S): 18 CAPSULE ORAL; RESPIRATORY (INHALATION) at 07:51

## 2020-02-04 RX ADMIN — AZITHROMYCIN 500 MILLIGRAM(S): 500 TABLET, FILM COATED ORAL at 12:07

## 2020-02-04 NOTE — PROGRESS NOTE ADULT - SUBJECTIVE AND OBJECTIVE BOX
JOAQUIN HERNANDEZ  59y, Male  Allergy: No Known Allergies    Hospital Day: 3d    Patient seen and examined earlier today. No acute events overnight.    PMH/PSH:  PAST MEDICAL & SURGICAL HISTORY:  Borderline diabetes  COPD (chronic obstructive pulmonary disease)  HTN (hypertension)  Thyroid cancer  H/O thyroidectomy      VITALS:  T(F): 97.3 (02-04-20 @ 05:18), Max: 97.3 (02-04-20 @ 05:18)  HR: 86 (02-04-20 @ 08:49)  BP: 166/72 (02-04-20 @ 08:49) (166/72 - 189/101)  RR: 18 (02-04-20 @ 05:18)  SpO2: --    TESTS & MEASUREMENTS:  Weight (Kg):   BMI (kg/m2): 38.3 (02-01)                          13.9   18.82 )-----------( 324      ( 04 Feb 2020 07:40 )             43.5       02-04    136  |  94<L>  |  28<H>  ----------------------------<  143<H>  5.1<H>   |  26  |  0.8    Ca    9.3      04 Feb 2020 07:40              Culture - Sputum (collected 02-02-20 @ 16:53)  Source: .Sputum Sputum  Gram Stain (02-03-20 @ 00:56):    Rare polymorphonuclear leukocytes per low power field    Rare Squamous epithelial cells per low power field    Numerous Gram Variable Cocci seen per oil power field  Preliminary Report (02-03-20 @ 18:50):    Numerous Moraxella catarrhalis "Susceptibilities not performed"    Normal Respiratory Terri present          RADIOLOGY & ADDITIONAL TESTS:    RECENT DIAGNOSTIC ORDERS:  Thyroid Stimulating Hormone, Serum: AM Sched. Collection: 04-Feb-2020 04:30 (02-03-20 @ 12:54)      MEDICATIONS:  MEDICATIONS  (STANDING):  albuterol/ipratropium for Nebulization. 3 milliLiter(s) Nebulizer every 6 hours  azithromycin   Tablet 500 milliGRAM(s) Oral daily  budesonide 160 MICROgram(s)/formoterol 4.5 MICROgram(s) Inhaler 2 Puff(s) Inhalation two times a day  dextrose 5%. 1000 milliLiter(s) (50 mL/Hr) IV Continuous <Continuous>  dextrose 50% Injectable 12.5 Gram(s) IV Push once  dextrose 50% Injectable 25 Gram(s) IV Push once  dextrose 50% Injectable 25 Gram(s) IV Push once  insulin lispro (HumaLOG) corrective regimen sliding scale   SubCutaneous three times a day before meals  levothyroxine 175 MICROGram(s) Oral daily  methylPREDNISolone sodium succinate Injectable 40 milliGRAM(s) IV Push every 8 hours  metoprolol succinate ER 25 milliGRAM(s) Oral daily  montelukast 10 milliGRAM(s) Oral daily  pantoprazole    Tablet 40 milliGRAM(s) Oral before breakfast  tiotropium 18 MICROgram(s) Capsule 1 Capsule(s) Inhalation daily    MEDICATIONS  (PRN):  ALBUTerol    90 MICROgram(s) HFA Inhaler 2 Puff(s) Inhalation every 6 hours PRN Shortness of Breath and/or Wheezing  albuterol/ipratropium for Nebulization 3 milliLiter(s) Nebulizer every 6 hours PRN Shortness of Breath and/or Wheezing  chlorhexidine 4% Liquid 1 Application(s) Topical daily PRN skin  dextrose 40% Gel 15 Gram(s) Oral once PRN Blood Glucose LESS THAN 70 milliGRAM(s)/deciliter  glucagon  Injectable 1 milliGRAM(s) IntraMuscular once PRN Glucose LESS THAN 70 milligrams/deciliter      HOME MEDICATIONS:  Advair Diskus (11-23)  levothyroxine 175 mcg (0.175 mg) oral tablet (11-23)  omeprazole 40 mg oral delayed release capsule (11-23)  pravastatin 40 mg oral tablet (11-23)  predniSONE 10 mg oral tablet (11-23)  Singulair 10 mg oral tablet (11-23)  Spiriva 18 mcg inhalation capsule (11-23)  Toprol-XL 25 mg oral tablet, extended release (11-23)  Ventolin HFA 90 mcg/inh inhalation aerosol (11-23)      REVIEW OF SYSTEMS:  All other review of systems is negative unless indicated above.     PHYSICAL EXAM:  GENERAL: NAD  HEENT: No Swelling  CHEST/LUNG: b/l expiratory wheeze  HEART: RRR, No murmurs  ABDOMEN: Soft, Bowel sounds present  EXTREMITIES:  No clubbing

## 2020-02-04 NOTE — PROGRESS NOTE ADULT - ATTENDING COMMENTS
Attending Statement: I have personally performed a face to face diagnostic evaluation on this patient. The patient is suffering from Exacerbation Of Obstructive Lung Disease. I have reviewed the above note and agree with the history, exam and suggestions for care, except as I have noted in the text.

## 2020-02-04 NOTE — PROGRESS NOTE ADULT - ASSESSMENT
59y old male with pmhx of COPD, HTN, thyroid cancer, s/p thyroidectomy presents to the ED complaining of shortness of breath and wheezing for past several days associated with coughing now progressively worse.    1. COPD exacerbation:Continue solumedrol 60 q8h, duonebs ATC. Azithromycin 500mg qd x 3 days will assess respiratory status each day prior to tapering. Cont symbicort/spiriva (pt takes advair, spiriva at home). Pulmonary on board  2. HTN: Cont metoprolol succinate 25 daily, pravastatin 20  3. Hypothyroidism, s/p thyroidectomy for thyroid CA: Cont synthroid 175.     GI/DVT PPX .     #Progress Note Handoff  Pending (specify): Improvement of respiratory status  Family discussion: d/w pt regarding continuing IV steroids  Disposition: Home 59y old male with pmhx of COPD, HTN, thyroid cancer, s/p thyroidectomy presents to the ED complaining of shortness of breath and wheezing for past several days associated with coughing now progressively worse.    1. COPD exacerbation:Continue solumedrol 60 q8h, duonebs ATC. Azithromycin 500mg qd x 3 days will assess respiratory status each day prior to tapering. Cont symbicort/spiriva (pt takes advair, spiriva at home). Pulmonary on board. Sputum cx + moraxella  2. HTN: Cont metoprolol succinate 25 daily, pravastatin 20  3. Hypothyroidism, s/p thyroidectomy for thyroid CA: Cont synthroid 175.     GI/DVT PPX .     #Progress Note Handoff  Pending (specify): Improvement of respiratory status  Family discussion: d/w pt regarding continuing IV steroids  Disposition: Home 59y old male with pmhx of COPD, HTN, thyroid cancer, s/p thyroidectomy presents to the ED complaining of shortness of breath and wheezing for past several days associated with coughing now progressively worse.    1. COPD exacerbation:Continue solumedrol 60 q8h, duonebs ATC. Azithromycin 500mg qd x 3 days will assess respiratory status each day prior to tapering. Cont symbicort/spiriva (pt takes advair, spiriva at home). Pulmonary on board. Sputum cx + moraxella  2. HTN: Cont metoprolol succinate 25 daily, pravastatin 20. Bp running high, ordered amlodipine 10mg daily starting tomorrow  3. Hypothyroidism, s/p thyroidectomy for thyroid CA: Cont synthroid 175.     GI/DVT PPX .     #Progress Note Handoff  Pending (specify): Improvement of respiratory status  Family discussion: d/w pt regarding continuing IV steroids  Disposition: Home

## 2020-02-04 NOTE — PROGRESS NOTE ADULT - SUBJECTIVE AND OBJECTIVE BOX
Patient is a 59y old  Male who presents with a chief complaint of COPD exercebation (03 Feb 2020 12:51)    SUBJECTIVE: No acute events overnight; afebrile       REVIEW OF SYSTEMS:    CONSTITUTIONAL:   no fever   no chills.  no weight gain   no weight loss    EYES:   no discharge,   no pain    ENT:   Ears: no ear pain and no hearing problems.  Nose: no nasal congestion and no nasal drainage.    CARDIOVASCULAR:   no chest pain,   no palpitations    RESPIRATORY:  + SOB,  + wheezing ,  no respiratory difficulty  + sputum production    GASTROINTESTINAL:   no abdominal pain,   no diarrhea,   no vomiting.    GENITOURINARY:  no dysuria,   no hematuria.    MUSCULOSKELETAL:   no back pain,   no musculoskeletal pain,  no weakness.    SKIN:   no jaundice,    no rashes.    NEURO:   no loss of consciousness,   no headache,   no weakness.    PSYCHIATRIC:   no known mental health issues  no anxiety  no depression    ALLERGIC/IMMUNOLOGIC:   No active allergic or immunologic issues      PHYSICAL EXAM  Vital Signs Last 24 Hrs  T(C): 36.3 (04 Feb 2020 05:18), Max: 36.3 (04 Feb 2020 05:18)  T(F): 97.3 (04 Feb 2020 05:18), Max: 97.3 (04 Feb 2020 05:18)  HR: 73 (04 Feb 2020 05:18) (73 - 82)  BP: 189/101 (04 Feb 2020 05:18) (167/88 - 189/101)  BP(mean): --  RR: 18 (04 Feb 2020 05:18) (18 - 18)  SpO2: 97 % RA    CONSTITUTIONAL:   Well nourished.  NAD    ENT:   Airway patent,   No thrush    EYES:   Clear bilaterally,   pupils equal, round and reactive to light.    CARDIAC:   Normal rate,   regular rhythm.    no edema      CAROTID:   normal systolic impulse  no bruits    RESPIRATORY:   + wheezing  Normal chest expansion  Not tachypneic,  no use of accessory muscles    GASTROINTESTINAL:  Abdomen soft,   non-tender,   no guarding,   + BS    GENITOURINARY  normal genitalia for sex  no edema    MUSCULOSKELETAL:   range of motion is not limited,  no clubbing, cyanosis    NEUROLOGICAL:   Alert and oriented   no motor  deficits.    SKIN:   Skin normal color for race,   No evidence of rash.    PSYCHIATRIC:   normal mood and affect.   no apparent risk to self or others.    HEME LYMPH:   No cervical  lymphadenopathy.          LABS:                          13.3   20.16 )-----------( 299      ( 03 Feb 2020 19:31 )             40.5                                               02-03    139  |  99  |  28<H>  ----------------------------<  152<H>  4.9   |  27  |  1.1    Ca    9.5      03 Feb 2020 19:31                                                                                                                                      Culture - Sputum (collected 02 Feb 2020 16:53)  Source: .Sputum Sputum  Gram Stain (03 Feb 2020 00:56):    Rare polymorphonuclear leukocytes per low power field    Rare Squamous epithelial cells per low power field    Numerous Gram Variable Cocci seen per oil power field  Preliminary Report (03 Feb 2020 18:50):    Numerous Moraxella catarrhalis "Susceptibilities not performed"    Normal Respiratory Terri present                                                    MEDICATIONS  (STANDING):  albuterol/ipratropium for Nebulization. 3 milliLiter(s) Nebulizer every 6 hours  azithromycin   Tablet 500 milliGRAM(s) Oral daily  budesonide 160 MICROgram(s)/formoterol 4.5 MICROgram(s) Inhaler 2 Puff(s) Inhalation two times a day  dextrose 5%. 1000 milliLiter(s) (50 mL/Hr) IV Continuous <Continuous>  dextrose 50% Injectable 12.5 Gram(s) IV Push once  dextrose 50% Injectable 25 Gram(s) IV Push once  dextrose 50% Injectable 25 Gram(s) IV Push once  insulin lispro (HumaLOG) corrective regimen sliding scale   SubCutaneous three times a day before meals  levothyroxine 175 MICROGram(s) Oral daily  methylPREDNISolone sodium succinate Injectable 40 milliGRAM(s) IV Push every 8 hours  metoprolol succinate ER 25 milliGRAM(s) Oral daily  montelukast 10 milliGRAM(s) Oral daily  pantoprazole    Tablet 40 milliGRAM(s) Oral before breakfast  tiotropium 18 MICROgram(s) Capsule 1 Capsule(s) Inhalation daily    MEDICATIONS  (PRN):  ALBUTerol    90 MICROgram(s) HFA Inhaler 2 Puff(s) Inhalation every 6 hours PRN Shortness of Breath and/or Wheezing  albuterol/ipratropium for Nebulization 3 milliLiter(s) Nebulizer every 6 hours PRN Shortness of Breath and/or Wheezing  chlorhexidine 4% Liquid 1 Application(s) Topical daily PRN skin  dextrose 40% Gel 15 Gram(s) Oral once PRN Blood Glucose LESS THAN 70 milliGRAM(s)/deciliter  glucagon  Injectable 1 milliGRAM(s) IntraMuscular once PRN Glucose LESS THAN 70 milligrams/deciliter      X-Rays reviewed < from: Xray Chest 2 Views PA/Lat (02.01.20 @ 06:45) >  No radiographic evidence of acute cardiopulmonary disease.    Stable enlarged cardiac silhouette.      < end of copied text >    Culture Results:   Numerous Moraxella catarrhalis "Susceptibilities not performed"  Normal Respiratory Terri present (02.02.20 @ 16:53)

## 2020-02-04 NOTE — PROGRESS NOTE ADULT - ASSESSMENT
IMPRESSION    Exacerbation Of Obstructive Lung Disease   H/O Asthma   Former smoker- never formally diagnosed with COPD   Sputum culture + Moraxella catarrhalis   Likely ACOS   Pulmonary nodule   High Risk CATHERINE (STOPBANG 7)      PLAN    C/W Solumedrol 60 IV q8   Consider switching Azithromycin to Augmentin  RVP   F/U cultures  Duonebs q4 and prn   Resume Home inhalers on discharge  DVT PPX  OOB to chair  Outpatient PFTs  Outpatient sleep study

## 2020-02-05 LAB
ANION GAP SERPL CALC-SCNC: 16 MMOL/L — HIGH (ref 7–14)
BUN SERPL-MCNC: 35 MG/DL — HIGH (ref 10–20)
CALCIUM SERPL-MCNC: 9.1 MG/DL — SIGNIFICANT CHANGE UP (ref 8.5–10.1)
CHLORIDE SERPL-SCNC: 95 MMOL/L — LOW (ref 98–110)
CO2 SERPL-SCNC: 25 MMOL/L — SIGNIFICANT CHANGE UP (ref 17–32)
CREAT SERPL-MCNC: 0.9 MG/DL — SIGNIFICANT CHANGE UP (ref 0.7–1.5)
GLUCOSE BLDC GLUCOMTR-MCNC: 126 MG/DL — HIGH (ref 70–99)
GLUCOSE BLDC GLUCOMTR-MCNC: 187 MG/DL — HIGH (ref 70–99)
GLUCOSE BLDC GLUCOMTR-MCNC: 232 MG/DL — HIGH (ref 70–99)
GLUCOSE BLDC GLUCOMTR-MCNC: 258 MG/DL — HIGH (ref 70–99)
GLUCOSE SERPL-MCNC: 246 MG/DL — HIGH (ref 70–99)
HCT VFR BLD CALC: 40.7 % — LOW (ref 42–52)
HGB BLD-MCNC: 13.5 G/DL — LOW (ref 14–18)
MCHC RBC-ENTMCNC: 28.7 PG — SIGNIFICANT CHANGE UP (ref 27–31)
MCHC RBC-ENTMCNC: 33.2 G/DL — SIGNIFICANT CHANGE UP (ref 32–37)
MCV RBC AUTO: 86.6 FL — SIGNIFICANT CHANGE UP (ref 80–94)
NRBC # BLD: 0 /100 WBCS — SIGNIFICANT CHANGE UP (ref 0–0)
PLATELET # BLD AUTO: 306 K/UL — SIGNIFICANT CHANGE UP (ref 130–400)
POTASSIUM SERPL-MCNC: 4.8 MMOL/L — SIGNIFICANT CHANGE UP (ref 3.5–5)
POTASSIUM SERPL-SCNC: 4.8 MMOL/L — SIGNIFICANT CHANGE UP (ref 3.5–5)
RBC # BLD: 4.7 M/UL — SIGNIFICANT CHANGE UP (ref 4.7–6.1)
RBC # FLD: 13.3 % — SIGNIFICANT CHANGE UP (ref 11.5–14.5)
SODIUM SERPL-SCNC: 136 MMOL/L — SIGNIFICANT CHANGE UP (ref 135–146)
TSH SERPL-MCNC: 0.02 UIU/ML — LOW (ref 0.27–4.2)
WBC # BLD: 18.19 K/UL — HIGH (ref 4.8–10.8)
WBC # FLD AUTO: 18.19 K/UL — HIGH (ref 4.8–10.8)

## 2020-02-05 PROCEDURE — 99233 SBSQ HOSP IP/OBS HIGH 50: CPT

## 2020-02-05 RX ORDER — HYDROXYZINE HCL 10 MG
50 TABLET ORAL ONCE
Refills: 0 | Status: COMPLETED | OUTPATIENT
Start: 2020-02-05 | End: 2020-02-05

## 2020-02-05 RX ORDER — LANOLIN ALCOHOL/MO/W.PET/CERES
5 CREAM (GRAM) TOPICAL AT BEDTIME
Refills: 0 | Status: DISCONTINUED | OUTPATIENT
Start: 2020-02-05 | End: 2020-02-07

## 2020-02-05 RX ORDER — INSULIN LISPRO 100/ML
2 VIAL (ML) SUBCUTANEOUS ONCE
Refills: 0 | Status: COMPLETED | OUTPATIENT
Start: 2020-02-05 | End: 2020-02-05

## 2020-02-05 RX ADMIN — Medication 3 MILLILITER(S): at 08:37

## 2020-02-05 RX ADMIN — Medication 2: at 11:19

## 2020-02-05 RX ADMIN — Medication 3 MILLILITER(S): at 13:16

## 2020-02-05 RX ADMIN — Medication 40 MILLIGRAM(S): at 17:40

## 2020-02-05 RX ADMIN — Medication 175 MICROGRAM(S): at 05:20

## 2020-02-05 RX ADMIN — MONTELUKAST 10 MILLIGRAM(S): 4 TABLET, CHEWABLE ORAL at 11:20

## 2020-02-05 RX ADMIN — BUDESONIDE AND FORMOTEROL FUMARATE DIHYDRATE 2 PUFF(S): 160; 4.5 AEROSOL RESPIRATORY (INHALATION) at 08:19

## 2020-02-05 RX ADMIN — Medication 40 MILLIGRAM(S): at 05:21

## 2020-02-05 RX ADMIN — Medication 3 MILLILITER(S): at 01:59

## 2020-02-05 RX ADMIN — TIOTROPIUM BROMIDE 1 CAPSULE(S): 18 CAPSULE ORAL; RESPIRATORY (INHALATION) at 08:20

## 2020-02-05 RX ADMIN — BUDESONIDE AND FORMOTEROL FUMARATE DIHYDRATE 2 PUFF(S): 160; 4.5 AEROSOL RESPIRATORY (INHALATION) at 20:00

## 2020-02-05 RX ADMIN — PANTOPRAZOLE SODIUM 40 MILLIGRAM(S): 20 TABLET, DELAYED RELEASE ORAL at 05:20

## 2020-02-05 RX ADMIN — Medication 50 MILLIGRAM(S): at 21:46

## 2020-02-05 RX ADMIN — Medication 1: at 17:38

## 2020-02-05 RX ADMIN — Medication 2 UNIT(S): at 21:46

## 2020-02-05 RX ADMIN — AZITHROMYCIN 500 MILLIGRAM(S): 500 TABLET, FILM COATED ORAL at 11:20

## 2020-02-05 RX ADMIN — AMLODIPINE BESYLATE 10 MILLIGRAM(S): 2.5 TABLET ORAL at 05:21

## 2020-02-05 RX ADMIN — Medication 25 MILLIGRAM(S): at 05:20

## 2020-02-05 NOTE — PROGRESS NOTE ADULT - ASSESSMENT
59y old male with pmhx of COPD, HTN, thyroid cancer, s/p thyroidectomy presents to the ED complaining of shortness of breath and wheezing for past several days associated with coughing now progressively worse.    1. COPD exacerbation: change steroids to 40mg q12 IV and start PO in am, duonebs ATC  - Cont symbicort/spiriva (pt takes advair, spiriva at home). Pulmonary on board. Sputum cx + moraxella    2. HTN: Cont metoprolol succinate 25 daily, pravastatin 20. Bp running high, ordered amlodipine 10mg daily starting tomorrow    3. Hypothyroidism, s/p thyroidectomy for thyroid CA: Cont synthroid    GI/DVT PPX .       Progress Note Handoff  Pending Consults:  none  Pending Tests: none  Pending Results: none  Family Discussion: taper steroids and anticipate d/c in 48 hrs - pt is agreeable   Disposition: Home__x___/SNF______/Other_____/Unknown at this time_____    Please call me with any questions at extension [phone]

## 2020-02-05 NOTE — CHART NOTE - NSCHARTNOTEFT_GEN_A_CORE
Asked by Dr Eugene to change medication to Solu-medrol 40 mg Q12 today and Po Prednisone 60mg starting tomorrow.

## 2020-02-06 LAB
ALBUMIN SERPL ELPH-MCNC: 4.1 G/DL — SIGNIFICANT CHANGE UP (ref 3.5–5.2)
ALP SERPL-CCNC: 77 U/L — SIGNIFICANT CHANGE UP (ref 30–115)
ALT FLD-CCNC: 19 U/L — SIGNIFICANT CHANGE UP (ref 0–41)
ANION GAP SERPL CALC-SCNC: 14 MMOL/L — SIGNIFICANT CHANGE UP (ref 7–14)
AST SERPL-CCNC: 14 U/L — SIGNIFICANT CHANGE UP (ref 0–41)
BILIRUB SERPL-MCNC: 0.4 MG/DL — SIGNIFICANT CHANGE UP (ref 0.2–1.2)
BUN SERPL-MCNC: 29 MG/DL — HIGH (ref 10–20)
CALCIUM SERPL-MCNC: 9.3 MG/DL — SIGNIFICANT CHANGE UP (ref 8.5–10.1)
CHLORIDE SERPL-SCNC: 96 MMOL/L — LOW (ref 98–110)
CO2 SERPL-SCNC: 25 MMOL/L — SIGNIFICANT CHANGE UP (ref 17–32)
CREAT SERPL-MCNC: 0.9 MG/DL — SIGNIFICANT CHANGE UP (ref 0.7–1.5)
GLUCOSE BLDC GLUCOMTR-MCNC: 122 MG/DL — HIGH (ref 70–99)
GLUCOSE BLDC GLUCOMTR-MCNC: 125 MG/DL — HIGH (ref 70–99)
GLUCOSE BLDC GLUCOMTR-MCNC: 244 MG/DL — HIGH (ref 70–99)
GLUCOSE SERPL-MCNC: 272 MG/DL — HIGH (ref 70–99)
HCT VFR BLD CALC: 43.4 % — SIGNIFICANT CHANGE UP (ref 42–52)
HGB BLD-MCNC: 14.1 G/DL — SIGNIFICANT CHANGE UP (ref 14–18)
MCHC RBC-ENTMCNC: 28.3 PG — SIGNIFICANT CHANGE UP (ref 27–31)
MCHC RBC-ENTMCNC: 32.5 G/DL — SIGNIFICANT CHANGE UP (ref 32–37)
MCV RBC AUTO: 87.1 FL — SIGNIFICANT CHANGE UP (ref 80–94)
NRBC # BLD: 0 /100 WBCS — SIGNIFICANT CHANGE UP (ref 0–0)
PLATELET # BLD AUTO: 331 K/UL — SIGNIFICANT CHANGE UP (ref 130–400)
POTASSIUM SERPL-MCNC: 5.4 MMOL/L — HIGH (ref 3.5–5)
POTASSIUM SERPL-SCNC: 5.4 MMOL/L — HIGH (ref 3.5–5)
PROT SERPL-MCNC: 6.4 G/DL — SIGNIFICANT CHANGE UP (ref 6–8)
RBC # BLD: 4.98 M/UL — SIGNIFICANT CHANGE UP (ref 4.7–6.1)
RBC # FLD: 13.5 % — SIGNIFICANT CHANGE UP (ref 11.5–14.5)
SODIUM SERPL-SCNC: 135 MMOL/L — SIGNIFICANT CHANGE UP (ref 135–146)
WBC # BLD: 18.51 K/UL — HIGH (ref 4.8–10.8)
WBC # FLD AUTO: 18.51 K/UL — HIGH (ref 4.8–10.8)

## 2020-02-06 PROCEDURE — 99233 SBSQ HOSP IP/OBS HIGH 50: CPT

## 2020-02-06 RX ADMIN — Medication 175 MICROGRAM(S): at 05:28

## 2020-02-06 RX ADMIN — Medication 2: at 11:23

## 2020-02-06 RX ADMIN — PANTOPRAZOLE SODIUM 40 MILLIGRAM(S): 20 TABLET, DELAYED RELEASE ORAL at 05:28

## 2020-02-06 RX ADMIN — AMLODIPINE BESYLATE 10 MILLIGRAM(S): 2.5 TABLET ORAL at 05:28

## 2020-02-06 RX ADMIN — Medication 60 MILLIGRAM(S): at 05:27

## 2020-02-06 RX ADMIN — BUDESONIDE AND FORMOTEROL FUMARATE DIHYDRATE 2 PUFF(S): 160; 4.5 AEROSOL RESPIRATORY (INHALATION) at 07:47

## 2020-02-06 RX ADMIN — Medication 3 MILLILITER(S): at 14:05

## 2020-02-06 RX ADMIN — Medication 25 MILLIGRAM(S): at 05:28

## 2020-02-06 RX ADMIN — TIOTROPIUM BROMIDE 1 CAPSULE(S): 18 CAPSULE ORAL; RESPIRATORY (INHALATION) at 07:47

## 2020-02-06 RX ADMIN — MONTELUKAST 10 MILLIGRAM(S): 4 TABLET, CHEWABLE ORAL at 11:22

## 2020-02-06 RX ADMIN — Medication 3 MILLILITER(S): at 19:22

## 2020-02-06 RX ADMIN — Medication 3 MILLILITER(S): at 08:56

## 2020-02-06 RX ADMIN — BUDESONIDE AND FORMOTEROL FUMARATE DIHYDRATE 2 PUFF(S): 160; 4.5 AEROSOL RESPIRATORY (INHALATION) at 19:22

## 2020-02-06 NOTE — PROGRESS NOTE ADULT - ASSESSMENT
IMPRESSION    Exacerbation Of Obstructive Lung Disease - Improving  H/O Asthma   Former smoker- never formally diagnosed with COPD   Sputum culture + Moraxella catarrhalis   Likely ACOS   Pulmonary nodule   High Risk CATHERINE (STOPBANG 7)      PLAN    Prednisone taper  Consider Augmentin PO  F/U cultures  Duonebs q4 and prn   Resume Home inhalers on discharge  DVT PPX  OOB to chair  Outpatient PFTs  Outpatient sleep study

## 2020-02-06 NOTE — PROGRESS NOTE ADULT - SUBJECTIVE AND OBJECTIVE BOX
JOAQUIN HERNANDEZ  59y  Male      Patient is a 59y old Male who presents with a chief complaint of COPD exercebation (04 Feb 2020 11:04)      INTERVAL HPI/OVERNIGHT EVENTS:  Patient seen and examined earlier this morning.    Sitting up in the chair  mild expiratory wheezing  Ambulating around the unit without hypoxia.         REVIEW OF SYSTEMS:  CONSTITUTIONAL: No fever, weight loss, or fatigue  EYES: No eye pain, visual disturbances, or discharge  ENMT:  No difficulty hearing, tinnitus, vertigo; No sinus or throat pain  NECK: No pain or stiffness  RESPIRATORY: No cough, wheezing, chills or hemoptysis; No shortness of breath  CARDIOVASCULAR: No chest pain, palpitations, dizziness, or leg swelling  GASTROINTESTINAL: No abdominal or epigastric pain. No nausea, vomiting, or hematemesis; No diarrhea or constipation. No melena or hematochezia.  GENITOURINARY: No dysuria, frequency, hematuria, or incontinence  NEUROLOGICAL: No headaches, memory loss, loss of strength, numbness, or tremors  SKIN: No itching, burning, rashes, or lesions   LYMPH NODES: No enlarged glands  ENDOCRINE: No heat or cold intolerance; No hair loss  MUSCULOSKELETAL: No joint pain or swelling; No muscle, back, or extremity pain  PSYCHIATRIC: No depression, anxiety, mood swings, or difficulty sleeping  HEME/LYMPH: No easy bruising, or bleeding gums  ALLERY AND IMMUNOLOGIC: No hives or eczema      Vital Signs Last 24 Hrs  T(C): 35.6 (06 Feb 2020 05:07), Max: 36.7 (05 Feb 2020 14:14)  T(F): 96.1 (06 Feb 2020 05:07), Max: 98.1 (05 Feb 2020 14:14)  HR: 80 (06 Feb 2020 06:51) (79 - 95)  BP: 159/88 (06 Feb 2020 06:51) (140/96 - 171/92)  BP(mean): --  RR: 16 (06 Feb 2020 05:07) (16 - 18)  SpO2: 96% (06 Feb 2020 07:49) (96% - 96%)      PHYSICAL EXAM:  GENERAL: NAD, well-groomed, well-developed  HEAD:  Atraumatic, Normocephalic  EYES: EOMI, PERRLA, conjunctiva and sclera clear  ENMT: No tonsillar erythema, exudates, or enlargement; Moist mucous membranes, Good dentition, No lesions  NECK: Supple, No JVD, Normal thyroid  NERVOUS SYSTEM:  Alert & Oriented X3, Good concentration; Motor Strength 5/5 B/L upper and lower extremities; DTRs 2+ intact and symmetric  CHEST/LUNG: expiratory wheezing  HEART: Regular rate and rhythm; No murmurs, rubs, or gallops  ABDOMEN: Soft, Nontender, Nondistended; Bowel sounds present  EXTREMITIES:  2+ Peripheral Pulses, No clubbing, cyanosis, or edema  LYMPH: No lymphadenopathy noted  SKIN: No rashes or lesions    Consultant(s) Notes Reviewed:  [x ] YES  [ ] NO  Care Discussed with Consultants/Other Providers [ x] YES  [ ] NO    LAB:                                   14.1   18.51 )-----------( 331      ( 06 Feb 2020 09:04 )             43.4     02-06    135  |  96<L>  |  29<H>  ----------------------------<  272<H>  5.4<H>   |  25  |  0.9    Ca    9.3      06 Feb 2020 09:04    TPro  6.4  /  Alb  4.1  /  TBili  0.4  /  DBili  x   /  AST  14  /  ALT  19  /  AlkPhos  77  02-06        Drug Dosing Weight  Height (cm): 180.34 (01 Feb 2020 05:39)  Weight (kg): 124.7 (01 Feb 2020 05:39)  BMI (kg/m2): 38.3 (01 Feb 2020 05:39)  BSA (m2): 2.41 (01 Feb 2020 05:39)      CAPILLARY BLOOD GLUCOSE  POCT Blood Glucose.: 244 mg/dL (06 Feb 2020 11:19)  POCT Blood Glucose.: 122 mg/dL (06 Feb 2020 07:49)  POCT Blood Glucose.: 258 mg/dL (05 Feb 2020 21:18)  POCT Blood Glucose.: 187 mg/dL (05 Feb 2020 16:38)      RADIOLOGY & ADDITIONAL TESTS:  Imaging Personally Reviewed:  [x] YES  [ ] NO          MEDICATIONS  (STANDING):  albuterol/ipratropium for Nebulization. 3 milliLiter(s) Nebulizer every 6 hours  amLODIPine   Tablet 10 milliGRAM(s) Oral daily  budesonide 160 MICROgram(s)/formoterol 4.5 MICROgram(s) Inhaler 2 Puff(s) Inhalation two times a day  dextrose 5%. 1000 milliLiter(s) (50 mL/Hr) IV Continuous <Continuous>  dextrose 50% Injectable 12.5 Gram(s) IV Push once  dextrose 50% Injectable 25 Gram(s) IV Push once  dextrose 50% Injectable 25 Gram(s) IV Push once  insulin lispro (HumaLOG) corrective regimen sliding scale   SubCutaneous three times a day before meals  levothyroxine 175 MICROGram(s) Oral daily  metoprolol succinate ER 25 milliGRAM(s) Oral daily  montelukast 10 milliGRAM(s) Oral daily  pantoprazole    Tablet 40 milliGRAM(s) Oral before breakfast  predniSONE   Tablet 60 milliGRAM(s) Oral daily  tiotropium 18 MICROgram(s) Capsule 1 Capsule(s) Inhalation daily    MEDICATIONS  (PRN):  ALBUTerol    90 MICROgram(s) HFA Inhaler 2 Puff(s) Inhalation every 6 hours PRN Shortness of Breath and/or Wheezing  albuterol/ipratropium for Nebulization 3 milliLiter(s) Nebulizer every 6 hours PRN Shortness of Breath and/or Wheezing  chlorhexidine 4% Liquid 1 Application(s) Topical daily PRN skin  dextrose 40% Gel 15 Gram(s) Oral once PRN Blood Glucose LESS THAN 70 milliGRAM(s)/deciliter  glucagon  Injectable 1 milliGRAM(s) IntraMuscular once PRN Glucose LESS THAN 70 milligrams/deciliter  melatonin 5 milliGRAM(s) Oral at bedtime PRN Insomnia

## 2020-02-06 NOTE — PROGRESS NOTE ADULT - REASON FOR ADMISSION
COPD exercebation
COPD exacerbation
COPD exercebation

## 2020-02-06 NOTE — PROGRESS NOTE ADULT - ASSESSMENT
59y old male with pmhx of COPD, HTN, thyroid cancer, s/p thyroidectomy presents to the ED complaining of shortness of breath and wheezing for past several days associated with coughing now progressively worse.    1. COPD exacerbation: started PO steroids this morning, duonebs ATC  - Cont symbicort/spiriva (pt takes advair, spiriva at home). Pulmonary on board. Sputum cx + moraxella  -outpt sleep study and PFT's    2. HTN: stable on current tx    3. Hypothyroidism, s/p thyroidectomy for thyroid CA: Cont synthroid    GI/DVT PPX .       Progress Note Handoff  Pending Consults:  none  Pending Tests: none  Pending Results: none  Family Discussion: continue oral steroids and anticipate d/c in 24 hrs - pt is agreeable   Disposition: Home__x___/SNF______/Other_____/Unknown at this time_____    Please call me with any questions at extension [phone]

## 2020-02-06 NOTE — PROGRESS NOTE ADULT - ATTENDING COMMENTS
Attending Statement: I have personally performed a face to face diagnostic evaluation on this patient. The patient is suffering from Exacerbation Of Obstructive Lung Disease . I have reviewed the above note and agree with the history, exam and suggestions for care, except as I have noted in the text.

## 2020-02-06 NOTE — PROGRESS NOTE ADULT - SUBJECTIVE AND OBJECTIVE BOX
Patient is a 59y old  Male who presents with a chief complaint of COPD exacerbation (05 Feb 2020 14:37)    SUBJECTIVE:No acute events overnight; Reports improvement in SOB and cough      REVIEW OF SYSTEMS:    CONSTITUTIONAL:   no fever   no chills.  no weight gain   no weight loss    EYES:   no discharge,   no pain    ENT:   Ears: no ear pain and no hearing problems.  Nose: no nasal congestion and no nasal drainage.    CARDIOVASCULAR:   no chest pain,   no palpitations    RESPIRATORY:  +cough  no SOB,  no wheezing ,  no respiratory difficulty  + sputum production    GASTROINTESTINAL:   no abdominal pain,   no diarrhea,   no vomiting.    GENITOURINARY:  no dysuria,   no hematuria.    MUSCULOSKELETAL:   no back pain,   no musculoskeletal pain,  no weakness.    SKIN:   no jaundice,    no rashes.    NEURO:   no loss of consciousness,   no headache,   no weakness.    PSYCHIATRIC:   no known mental health issues  no anxiety  no depression    ALLERGIC/IMMUNOLOGIC:   No active allergic or immunologic issues      PHYSICAL EXAM  Vital Signs Last 24 Hrs  T(C): 35.6 (06 Feb 2020 05:07), Max: 36.7 (05 Feb 2020 14:14)  T(F): 96.1 (06 Feb 2020 05:07), Max: 98.1 (05 Feb 2020 14:14)  HR: 80 (06 Feb 2020 06:51) (79 - 95)  BP: 159/88 (06 Feb 2020 06:51) (140/96 - 171/92)  BP(mean): --  RR: 16 (06 Feb 2020 05:07) (16 - 18)  SpO2: 96% on RA    CONSTITUTIONAL:   Well nourished.  NAD    ENT:   Airway patent,   No thrush    EYES:   Clear bilaterally,   pupils equal, round and reactive to light.    CARDIAC:   Normal rate,   regular rhythm.    no edema      CAROTID:   normal systolic impulse  no bruits    RESPIRATORY:   No wheezing  Normal chest expansion  Not tachypneic,  no use of accessory muscles    GASTROINTESTINAL:  Abdomen soft,   non-tender,   no guarding,   + BS    GENITOURINARY  normal genitalia for sex  no edema    MUSCULOSKELETAL:   range of motion is not limited,  no clubbing, cyanosis    NEUROLOGICAL:   Alert and oriented   no motor  deficits.    SKIN:   Skin normal color for race,   No evidence of rash.    PSYCHIATRIC:   normal mood and affect.   no apparent risk to self or others.    HEME LYMPH:   No cervical  lymphadenopathy.  no inguinal lymphadenopathy        LABS:                          13.5   18.19 )-----------( 306      ( 05 Feb 2020 10:41 )             40.7                                               02-05    136  |  95<L>  |  35<H>  ----------------------------<  246<H>  4.8   |  25  |  0.9    Ca    9.1      05 Feb 2020 10:41                                                                                                                                                                                    MEDICATIONS  (STANDING):  albuterol/ipratropium for Nebulization. 3 milliLiter(s) Nebulizer every 6 hours  amLODIPine   Tablet 10 milliGRAM(s) Oral daily  budesonide 160 MICROgram(s)/formoterol 4.5 MICROgram(s) Inhaler 2 Puff(s) Inhalation two times a day  dextrose 5%. 1000 milliLiter(s) (50 mL/Hr) IV Continuous <Continuous>  dextrose 50% Injectable 12.5 Gram(s) IV Push once  dextrose 50% Injectable 25 Gram(s) IV Push once  dextrose 50% Injectable 25 Gram(s) IV Push once  insulin lispro (HumaLOG) corrective regimen sliding scale   SubCutaneous three times a day before meals  levothyroxine 175 MICROGram(s) Oral daily  metoprolol succinate ER 25 milliGRAM(s) Oral daily  montelukast 10 milliGRAM(s) Oral daily  pantoprazole    Tablet 40 milliGRAM(s) Oral before breakfast  predniSONE   Tablet 60 milliGRAM(s) Oral daily  tiotropium 18 MICROgram(s) Capsule 1 Capsule(s) Inhalation daily    MEDICATIONS  (PRN):  ALBUTerol    90 MICROgram(s) HFA Inhaler 2 Puff(s) Inhalation every 6 hours PRN Shortness of Breath and/or Wheezing  albuterol/ipratropium for Nebulization 3 milliLiter(s) Nebulizer every 6 hours PRN Shortness of Breath and/or Wheezing  chlorhexidine 4% Liquid 1 Application(s) Topical daily PRN skin  dextrose 40% Gel 15 Gram(s) Oral once PRN Blood Glucose LESS THAN 70 milliGRAM(s)/deciliter  glucagon  Injectable 1 milliGRAM(s) IntraMuscular once PRN Glucose LESS THAN 70 milligrams/deciliter  melatonin 5 milliGRAM(s) Oral at bedtime PRN Insomnia    < from: Xray Chest 2 Views PA/Lat (02.01.20 @ 06:45) >    No radiographic evidence of acute cardiopulmonary disease.    Stable enlarged cardiac silhouette.    < end of copied text >

## 2020-02-07 ENCOUNTER — TRANSCRIPTION ENCOUNTER (OUTPATIENT)
Age: 60
End: 2020-02-07

## 2020-02-07 VITALS
RESPIRATION RATE: 16 BRPM | OXYGEN SATURATION: 95 % | HEART RATE: 91 BPM | TEMPERATURE: 98 F | SYSTOLIC BLOOD PRESSURE: 141 MMHG | DIASTOLIC BLOOD PRESSURE: 90 MMHG

## 2020-02-07 LAB
ANION GAP SERPL CALC-SCNC: 12 MMOL/L — SIGNIFICANT CHANGE UP (ref 7–14)
BUN SERPL-MCNC: 27 MG/DL — HIGH (ref 10–20)
CALCIUM SERPL-MCNC: 9.2 MG/DL — SIGNIFICANT CHANGE UP (ref 8.5–10.1)
CHLORIDE SERPL-SCNC: 96 MMOL/L — LOW (ref 98–110)
CO2 SERPL-SCNC: 29 MMOL/L — SIGNIFICANT CHANGE UP (ref 17–32)
CREAT SERPL-MCNC: 0.9 MG/DL — SIGNIFICANT CHANGE UP (ref 0.7–1.5)
GLUCOSE SERPL-MCNC: 138 MG/DL — HIGH (ref 70–99)
POTASSIUM SERPL-MCNC: 4.8 MMOL/L — SIGNIFICANT CHANGE UP (ref 3.5–5)
POTASSIUM SERPL-SCNC: 4.8 MMOL/L — SIGNIFICANT CHANGE UP (ref 3.5–5)
SODIUM SERPL-SCNC: 137 MMOL/L — SIGNIFICANT CHANGE UP (ref 135–146)

## 2020-02-07 PROCEDURE — 99239 HOSP IP/OBS DSCHRG MGMT >30: CPT

## 2020-02-07 RX ORDER — AMLODIPINE BESYLATE 2.5 MG/1
1 TABLET ORAL
Qty: 30 | Refills: 0
Start: 2020-02-07

## 2020-02-07 RX ORDER — FLUTICASONE PROPIONATE AND SALMETEROL 50; 250 UG/1; UG/1
0 POWDER ORAL; RESPIRATORY (INHALATION)
Qty: 0 | Refills: 0 | DISCHARGE

## 2020-02-07 RX ADMIN — AMLODIPINE BESYLATE 10 MILLIGRAM(S): 2.5 TABLET ORAL at 06:07

## 2020-02-07 RX ADMIN — Medication 3 MILLILITER(S): at 08:31

## 2020-02-07 RX ADMIN — Medication 60 MILLIGRAM(S): at 06:07

## 2020-02-07 RX ADMIN — Medication 25 MILLIGRAM(S): at 06:07

## 2020-02-07 RX ADMIN — BUDESONIDE AND FORMOTEROL FUMARATE DIHYDRATE 2 PUFF(S): 160; 4.5 AEROSOL RESPIRATORY (INHALATION) at 08:32

## 2020-02-07 RX ADMIN — PANTOPRAZOLE SODIUM 40 MILLIGRAM(S): 20 TABLET, DELAYED RELEASE ORAL at 06:07

## 2020-02-07 RX ADMIN — Medication 175 MICROGRAM(S): at 06:07

## 2020-02-07 RX ADMIN — TIOTROPIUM BROMIDE 1 CAPSULE(S): 18 CAPSULE ORAL; RESPIRATORY (INHALATION) at 08:31

## 2020-02-07 NOTE — DISCHARGE NOTE PROVIDER - CARE PROVIDER_API CALL
Osman Carrillo)  Family Medicine  [address] [place] [address]  Phone: [phone]  Fax: [phone]  Follow Up Time: 1 week    Ted Brown ()  Critical Care Medicine; Pulmonary Disease; Sleep Medicine  [address] [place] [address], [facility] [address]  [place] [address]  Phone: [phone]  Fax: [phone]  Follow Up Time: 1 week

## 2020-02-07 NOTE — DISCHARGE NOTE PROVIDER - PROVIDER TOKENS
PROVIDER:[TOKEN:[[id number]],FOLLOWUP:[1 week]],PROVIDER:[TOKEN:[[id number]],FOLLOWUP:[1 week]]

## 2020-02-07 NOTE — DISCHARGE NOTE NURSING/CASE MANAGEMENT/SOCIAL WORK - PATIENT PORTAL LINK FT
You can access the FollowMyHealth Patient Portal offered by [facility] by registering at the following website: [email or web address]. By joining [facility]’s FollowMyHealth portal, you will also be able to view your health information using other applications (apps) compatible with our system.

## 2020-02-07 NOTE — DISCHARGE NOTE NURSING/CASE MANAGEMENT/SOCIAL WORK - NSDCPEEMAIL_GEN_ALL_CORE
[facility] for Tobacco Control email [email or web address]

## 2020-02-07 NOTE — DISCHARGE NOTE PROVIDER - HOSPITAL COURSE
59y old male with pmhx of COPD, HTN, thyroid cancer, s/p thyroidectomy presents to the ED complaining of shortness of breath and wheezing for past several days associated with coughing now progressively worse.  Pt was seen in ED 4 days ago for COPD exacerbation, discharged on oral  prednisone with no improvement. pt states worsening of symptoms today despite treatment with albuterol at home. pt denies leg swelling, chest/abdominal pain, n/v fever or chills.        Patient placed on IV steroids and IV abx.  He did well during his hospitalization and is stable for d/c today.     Patient seen and examined this morning.     Sitting comfortably in the chair.     In NAD.        Vital Signs Last 24 Hrs    T(C): 36.5 (07 Feb 2020 05:18), Max: 36.8 (06 Feb 2020 14:17)    T(F): 97.7 (07 Feb 2020 05:18), Max: 98.2 (06 Feb 2020 14:17)    HR: 80 (07 Feb 2020 05:18) (80 - 98)    BP: 166/97 (07 Feb 2020 05:18) (123/65 - 166/97)    BP(mean): --    RR: 16 (07 Feb 2020 05:18) (16 - 16)    SpO2: 96% (06 Feb 2020 07:49) (96% - 96%)        PHYSICAL EXAM:    GENERAL: NAD, well-groomed, well-developed    HEAD:  Atraumatic, Normocephalic    EYES: EOMI, PERRLA, conjunctiva and sclera clear    NERVOUS SYSTEM:  Alert & Oriented X 4, Good concentration; Motor Strength 5/5 B/L upper and lower extremities; DTRs 2+ intact and symmetric    CHEST/LUNG: good air entry; mild exp wheezing - improved from yesterday     HEART: Regular rate and rhythm; No murmurs, rubs, or gallops    ABDOMEN: Soft, Nontender, Nondistended; Bowel sounds present    EXTREMITIES:  2+ Peripheral Pulses, No clubbing, cyanosis, or edema    SKIN: No rashes or lesions        d/c home today with close outpt follow up with pulm    d/c plannign took over 55 min    d/c papers done by me

## 2020-02-07 NOTE — DISCHARGE NOTE NURSING/CASE MANAGEMENT/SOCIAL WORK - NSDCPEWEB_GEN_ALL_CORE
[facility] for Tobacco Control website --- [email or web address] --- [email or web address]

## 2020-02-07 NOTE — DISCHARGE NOTE PROVIDER - NSDCMRMEDTOKEN_GEN_ALL_CORE_FT
Advair Diskus: 2 puff(s) inhaled 2 times a day  levothyroxine 175 mcg (0.175 mg) oral tablet: 1 tab(s) orally once a day  omeprazole 40 mg oral delayed release capsule: 1 cap(s) orally once a day  pravastatin 40 mg oral tablet: 1 tab(s) orally once a day  Singulair 10 mg oral tablet: 1 tab(s) orally once a day  Spiriva 18 mcg inhalation capsule: 1 cap(s) inhaled once a day  Toprol-XL 25 mg oral tablet, extended release: 1 tab(s) orally once a day  Ventolin HFA 90 mcg/inh inhalation aerosol: 2 puff(s) inhaled 4 times a day

## 2020-02-07 NOTE — DISCHARGE NOTE PROVIDER - NSDCCPCAREPLAN_GEN_ALL_CORE_FT
PRINCIPAL DISCHARGE DIAGNOSIS  Diagnosis: COPD exacerbation  Assessment and Plan of Treatment: take medication as prescribed  follow up with pulmonary in one week

## 2020-02-10 LAB — GLUCOSE BLDC GLUCOMTR-MCNC: 136 MG/DL — HIGH (ref 70–99)

## 2020-02-11 DIAGNOSIS — I10 ESSENTIAL (PRIMARY) HYPERTENSION: ICD-10-CM

## 2020-02-11 DIAGNOSIS — Z87.891 PERSONAL HISTORY OF NICOTINE DEPENDENCE: ICD-10-CM

## 2020-02-11 DIAGNOSIS — R73.03 PREDIABETES: ICD-10-CM

## 2020-02-11 DIAGNOSIS — E83.42 HYPOMAGNESEMIA: ICD-10-CM

## 2020-02-11 DIAGNOSIS — Z85.850 PERSONAL HISTORY OF MALIGNANT NEOPLASM OF THYROID: ICD-10-CM

## 2020-02-11 DIAGNOSIS — J45.901 UNSPECIFIED ASTHMA WITH (ACUTE) EXACERBATION: ICD-10-CM

## 2020-02-11 DIAGNOSIS — E89.0 POSTPROCEDURAL HYPOTHYROIDISM: ICD-10-CM

## 2020-02-11 DIAGNOSIS — J44.1 CHRONIC OBSTRUCTIVE PULMONARY DISEASE WITH (ACUTE) EXACERBATION: ICD-10-CM

## 2020-02-11 DIAGNOSIS — R06.02 SHORTNESS OF BREATH: ICD-10-CM

## 2020-02-11 DIAGNOSIS — E78.5 HYPERLIPIDEMIA, UNSPECIFIED: ICD-10-CM

## 2020-02-11 DIAGNOSIS — R91.1 SOLITARY PULMONARY NODULE: ICD-10-CM

## 2020-05-28 NOTE — ED PROVIDER NOTE - PROGRESS NOTE DETAILS
I personally evaluated the patient. I reviewed the Resident’s or Physician Assistant’s note (as assigned above), and agree with the findings and plan except as documented in my note.   58 y/o M with PMH COPD and thyroid CA, s/p thyroidectomy, presents with chest discomfort, associated with some lightheadedness and dizziness that started a few hours PTA. 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 No SOB or palpitations, and pt reports CP is improving. Pt also states the first SX he felt was the lightheadedness, to which he still is feeling some dizziness and HA. No numbness, tingling, vision changes or focal weakness. On exam: NCAT, (+) hoarse voice at baseline, secondary to thyroidectomy. PERRLA, EOMI. OP clear. Lungs CTAB. RRR, S1S2 noted. Radial pulses 2+ and equal, pedal pulses 2+ and equal. Abdomen soft, NT/ND, no rebound or guarding. FROM x4 extremities. No focal neuro deficits. Plan: will get EKG, labs including Troponin, CXR and CT head.
28.3

## 2020-09-07 ENCOUNTER — EMERGENCY (EMERGENCY)
Facility: HOSPITAL | Age: 60
LOS: 0 days | Discharge: HOME | End: 2020-09-07
Attending: EMERGENCY MEDICINE | Admitting: EMERGENCY MEDICINE
Payer: COMMERCIAL

## 2020-09-07 VITALS
TEMPERATURE: 97 F | HEART RATE: 99 BPM | RESPIRATION RATE: 18 BRPM | WEIGHT: 278 LBS | SYSTOLIC BLOOD PRESSURE: 129 MMHG | DIASTOLIC BLOOD PRESSURE: 84 MMHG | OXYGEN SATURATION: 99 %

## 2020-09-07 DIAGNOSIS — H57.11 OCULAR PAIN, RIGHT EYE: ICD-10-CM

## 2020-09-07 DIAGNOSIS — I10 ESSENTIAL (PRIMARY) HYPERTENSION: ICD-10-CM

## 2020-09-07 DIAGNOSIS — E89.0 POSTPROCEDURAL HYPOTHYROIDISM: Chronic | ICD-10-CM

## 2020-09-07 DIAGNOSIS — J44.9 CHRONIC OBSTRUCTIVE PULMONARY DISEASE, UNSPECIFIED: ICD-10-CM

## 2020-09-07 DIAGNOSIS — H43.811 VITREOUS DEGENERATION, RIGHT EYE: ICD-10-CM

## 2020-09-07 PROCEDURE — 99284 EMERGENCY DEPT VISIT MOD MDM: CPT

## 2020-09-07 NOTE — ED PROVIDER NOTE - CLINICAL SUMMARY MEDICAL DECISION MAKING FREE TEXT BOX
50yo M with PMHx macular degeneration presents for right eye blurriness. POCUS showing vitreous detachment. F/u ophtho. Return precautions given.

## 2020-09-07 NOTE — ED PROVIDER NOTE - NSFOLLOWUPCLINICS_GEN_ALL_ED_FT
[facility]  Ophthalmolgy  [address], [address]  [place] [address]  Phone: [phone]  Fax:   Follow Up Time: 1-3 Days

## 2020-09-07 NOTE — ED ADULT TRIAGE NOTE - CHIEF COMPLAINT QUOTE
Pt states "I started seeing dots out of my right eye on Saturday.  Yesterday I started seeing cobwebs.  Today I started seeing flashes.  I am having all three now".

## 2020-09-07 NOTE — ED PROVIDER NOTE - OBJECTIVE STATEMENT
61 yo male, pmh of dm, htn, copd, asthma, macular degeneration of b/l eyes, presents to ed for right eye vision change. pt states since three days ago has been having spots to right eye that's central in vision. pt denies pain to eye, trauma, loss of vision. denies cp, sob, numbness, tingling, ha, neck pain.

## 2020-09-07 NOTE — ED PROVIDER NOTE - PHYSICAL EXAMINATION
Physical Exam    Vital Signs: I have reviewed the initial vital signs.  Constitutional: well-nourished, appears stated age, no acute distress  Eyes: Conjunctiva pink, Sclera clear, PERRLA, EOMI, VA 20/40 b/l and in each eye, this is baseline as per pt.   Cardiovascular: S1 and S2, regular rate, regular rhythm, well-perfused extremities, radial pulses equal and 2+  Respiratory: unlabored respiratory effort, clear to auscultation bilaterally no wheezing, rales and rhonchi  Gastrointestinal: soft, non-tender abdomen, no pulsatile mass, normal bowl sounds  Musculoskeletal: supple neck, no lower extremity edema, no midline tenderness  Integumentary: warm, dry, no rash  Neurologic: awake, alert, cranial nerves II-XII grossly intact, extremities’ motor and sensory functions grossly intact

## 2020-09-07 NOTE — ED PROVIDER NOTE - PATIENT PORTAL LINK FT
You can access the FollowMyHealth Patient Portal offered by [facility] by registering at the following website: [email or web address]. By joining [employer]’s FollowMyHealth portal, you will also be able to view your health information using other applications (apps) compatible with our system.

## 2020-09-07 NOTE — ED PROVIDER NOTE - NSFOLLOWUPINSTRUCTIONS_ED_ALL_ED_FT
Vitreous Detachment     Vitreous detachment is part of the normal aging process in the eyes. Vitreous is the jelly-like substance that makes up most of the inside of the eyeballs. It helps the eyeballs keep a round shape. The vitreous is attached to the retina of the eye with a series of fibers.  As you age, the vitreous gradually shrinks. Tension increases between the fibers and the retina. Eventually, the fibers can break free from the retina, causing vitreous detachment. In most cases, this does not cause problems and does not require treatment. However, it can sometimes cause the retina to separate from the eyeball (retinal detachment), which requires treatment to prevent vision loss.  What are the causes?  Aging is the main cause of vitreous detachment. Everyone's vitreous naturally shrinks with age.  What increases the risk?  You are more likely to have vitreous detachment if you:  Are at least 50 years old. Have inflammation of the eye. Have an eye injury. Have had eye surgery. Have a hemorrhage in your eye. Are very nearsighted (myopia).Have diabetes. What are the signs or symptoms?  Most people with this condition will not notice any symptoms. If symptoms do occur, the most common are floaters. Floaters occur as the vitreous begins to shrink. They may:  Appear as tiny dots or webs in your vision. Seem to disappear when you look at them directly. Appear more often as your condition gets worse. Other symptoms include:  Flashes of light (photopsia) in your peripheral vision that may look like lightning streaks. Decreased vision or a dark curtain or shadow moving across your field of vision. This is rare. How is this diagnosed?  This condition may be diagnosed based on:  Your signs and symptoms. An exam by a health care provider who specializes in conditions and diseases of the eye (ophthalmologist). The exam may include:  Putting eye drops in your eye to make the pupil wider (dilated). The pupil is the opening in the center of the eye. Checking the pupils with a magnifying glass. This exam is the best way to determine the type and extent of damage to your eye. How is this treated?  For most people, a vitreous detachment is harmless, causing no symptoms or vision loss, and does not require treatment. Floaters usually become less noticeable over time.  If the condition causes retinal detachment, you may need eye surgery to reattach your retina (reattachment surgery) in order to prevent vision loss or restore your vision.  Follow these instructions at home:  Keep all follow-up visits as told by your health care provider. This is important. Get help right away if:  You develop signs of retinal detachment. These include:  A sudden increase in the number of floaters you see. An increase in the number of flashes of light you see in your peripheral vision. Decreased vision.    Summary  Vitreous detachment is part of the normal aging process in the eyes. Vitreous is the jelly-like substance inside the eyeballs. As you age, the vitreous shrinks, and the fibers that attach the vitreous to the retina can break free, causing vitreous detachment. In most cases, vitreous detachment does not cause symptoms and does not require treatment. The most common symptom that can occur is seeing floaters that appear as tiny dots or webs in your vision. Vitreous detachment can sometimes cause the retina to separate from the eyeball (retinal detachment). This must be treated to prevent vision loss. This information is not intended to replace advice given to you by your health care provider. Make sure you discuss any questions you have with your health care provider.

## 2020-09-07 NOTE — ED PROVIDER NOTE - ATTENDING CONTRIBUTION TO CARE
59yo M with PMHx HTN, COPD, asthma, macular degeneration, presents for left vision change. Pt states 3 days ago felt like left vision was hazy like "a cobweb over the eye." Today he noticed flashing lights in his right peripheral vision prompting ED visit. Denies eye pain or trauma, eye swelling or redness. Denies fever, headache, dizziness, hearing change.    Vital signs reviewed  GENERAL: Patient nontoxic appearing, NAD  HEAD: NCAT  EYES: Anicteric. PERRL. EOMI. 20/40 OD, OS, OU. No periorbital edema. conjunctiva clear.   SKIN:  Warm and dry  NEURO: AAOx3. No gross FND.

## 2020-11-18 NOTE — ED ADULT NURSE NOTE - PAIN: PRESENCE, MLM
Subjective:     Patient ID: Alea Terrazas is a 64 y.o. female.    Chief Complaint: Pain and Post-op Evaluation of the Right Hand    The patient is a 64-year-old female status post right carpal tunnel release and right trigger thumb release date of surgery is 09/05/2019.  She states she is doing quite well.      Past Medical History:   Diagnosis Date    Anxiety     Arthritis     Carpal tunnel syndrome     Chronic neck and back pain     Hypertension     Trigger thumb 9/4/2019     Past Surgical History:   Procedure Laterality Date    CARPAL TUNNEL RELEASE Left     CERVICAL FUSION  2016, 2017    RELEASE, CARPAL TUNNEL Right 9/5/2019    [facility]    RELEASE, TRIGGER FINGER Right 9/5/2019    [facility] by Billy [facility] at [facility]    ROTATOR CUFF REPAIR Right      Family History   Problem Relation Age of Onset    Cancer Father     Diabetes Father     COPD Mother 41    Kidney failure Mother      Social History     Socioeconomic History    Marital status:      Spouse name: Not on file    Number of children: Not on file    Years of education: Not on file    Highest education level: Not on file   Occupational History    Not on file   Social Needs    Financial resource strain: Not on file    Food insecurity:     Worry: Not on file     Inability: Not on file    Transportation needs:     Medical: Not on file     Non-medical: Not on file   Tobacco Use    Smoking status: Current Some Day Smoker     Packs/day: 0.50     Years: 41.00     Pack years: 20.50     Types: Cigarettes    Smokeless tobacco: Never Used   Substance and Sexual Activity    Alcohol use: Yes     Frequency: Monthly or less     Comment: occasionally    Drug use: No    Sexual activity: Not on file   Lifestyle    Physical activity:     Days per week: Not on file     Minutes per session: Not on file    Stress: Not on file   Relationships    Social connections:     Talks on phone: Not 
oscopy suite
on file     Gets together: Not on file     Attends Sabianism service: Not on file     Active member of club or organization: Not on file     Attends meetings of clubs or organizations: Not on file     Relationship status: Not on file   Other Topics Concern    Not on file   Social History Narrative    Not on file     Medication List with Changes/Refills   Current Medications    AQUANAZ 10- MG TAB    Take 1 tablet by mouth every 6 (six) hours as needed.     CETIRIZINE (ZYRTEC) 10 MG TABLET    Take 10 mg by mouth once daily.     CYCLOBENZAPRINE (FLEXERIL) 10 MG TABLET    Take 10 mg by mouth 3 (three) times daily as needed.     ERGOCALCIFEROL (ERGOCALCIFEROL) 50,000 UNIT CAP    Take 50,000 Units by mouth every 7 days.     FLUTICASONE (FLONASE) 50 MCG/ACTUATION NASAL SPRAY    2 sprays by Each Nare route once daily.    FUROSEMIDE (LASIX) 20 MG TABLET    Take 20 mg by mouth once daily.    GABAPENTIN (NEURONTIN) 300 MG CAPSULE    Take 300 mg by mouth daily as needed.     HYDROCODONE-ACETAMINOPHEN (NORCO) 5-325 MG PER TABLET    Take 1 tablet by mouth every 6 (six) hours as needed for Pain.    LORATADINE (CLARITIN) 10 MG TABLET    Take 10 mg by mouth once daily.    MELOXICAM (MOBIC) 15 MG TABLET    Take 15 mg by mouth once daily.    MONTELUKAST (SINGULAIR) 10 MG TABLET    Take 10 mg by mouth daily as needed.     NICOTINE (NICODERM CQ) 7 MG/24 HR    Place 1 patch onto the skin once daily.    OXYCODONE-ACETAMINOPHEN (PERCOCET) 5-325 MG PER TABLET    Take 1 tablet by mouth every 8 (eight) hours.    POTASSIUM CHLORIDE SA (K-DUR,KLOR-CON) 20 MEQ TABLET    Take 20 mEq by mouth once daily.    TRIAMTERENE-HYDROCHLOROTHIAZIDE 37.5-25 MG (DYAZIDE) 37.5-25 MG PER CAPSULE    Take 1 capsule by mouth once daily.     Review of patient's allergies indicates:  No Known Allergies  Review of Systems   Constitution: Negative for malaise/fatigue.   HENT: Negative for hearing loss.    Eyes: Negative for double vision and visual 
disturbance.   Cardiovascular: Negative for chest pain.   Respiratory: Negative for shortness of breath.    Endocrine: Negative for cold intolerance.   Hematologic/Lymphatic: Does not bruise/bleed easily.   Skin: Negative for poor wound healing and suspicious lesions.   Musculoskeletal: Positive for arthritis, back pain, joint pain, joint swelling and neck pain. Negative for gout.   Gastrointestinal: Negative for nausea and vomiting.   Genitourinary: Negative for dysuria.   Neurological: Positive for numbness, paresthesias and sensory change.   Psychiatric/Behavioral: Negative for depression, memory loss and substance abuse. The patient is not nervous/anxious.    Allergic/Immunologic: Negative for persistent infections.       Objective:   Body mass index is 26.94 kg/m².  Vitals:    09/18/19 0928   BP: 118/80   Pulse: (!) 56                General    Constitutional: She is oriented to person, place, and time. She appears well-developed and well-nourished. No distress.   HENT:   Head: Normocephalic.   Eyes: EOM are normal. Pupils are equal, round, and reactive to light.   Neck: Normal range of motion.   Pulmonary/Chest: Effort normal.   Neurological: She is oriented to person, place, and time.   Psychiatric: She has a normal mood and affect.             Right Hand/Wrist Exam     Inspection   Scars: Wrist - present Hand -  present    Pain   Wrist - The patient exhibits pain of the flexor/pronator group.    Tenderness   The patient is tender to palpation of the galeana area.    Other     Neuorologic Exam    Median Distribution: normal  Ulnar Distribution: normal  Radial Distribution: normal    Comments:  The carpal tunnel incision and trigger thumb incision looked good.  The suture lines intact.  There is no sign of infection.  There are no motor or sensory deficits.          Vascular Exam       Capillary Refill  Right Hand: normal capillary refill      Relevant imaging results reviewed and interpreted by me, discussed 
with the patient and / or family today no radiographs were obtained today  Assessment:     Encounter Diagnoses   Name Primary?    Trigger finger of right thumb Yes    Carpal tunnel syndrome on both sides         Plan:       The patient had the sutures removed.  Steri-Strips were applied.  Wound care was discussed. She will return on a as needed basis.              Disclaimer: This note was prepared using a voice recognition system and is likely to have sound alike errors within the text.   
denies pain/discomfort

## 2020-11-25 ENCOUNTER — INPATIENT (INPATIENT)
Facility: HOSPITAL | Age: 60
LOS: 8 days | Discharge: HOME HEALTH | End: 2020-12-04
Attending: INTERNAL MEDICINE | Admitting: INTERNAL MEDICINE
Payer: COMMERCIAL

## 2020-11-25 VITALS
SYSTOLIC BLOOD PRESSURE: 140 MMHG | WEIGHT: 274.92 LBS | DIASTOLIC BLOOD PRESSURE: 70 MMHG | RESPIRATION RATE: 23 BRPM | TEMPERATURE: 99 F | HEIGHT: 71 IN | OXYGEN SATURATION: 78 % | HEART RATE: 113 BPM

## 2020-11-25 DIAGNOSIS — U07.1 COVID-19: ICD-10-CM

## 2020-11-25 DIAGNOSIS — E89.0 POSTPROCEDURAL HYPOTHYROIDISM: Chronic | ICD-10-CM

## 2020-11-25 DIAGNOSIS — J44.9 CHRONIC OBSTRUCTIVE PULMONARY DISEASE, UNSPECIFIED: ICD-10-CM

## 2020-11-25 DIAGNOSIS — I10 ESSENTIAL (PRIMARY) HYPERTENSION: ICD-10-CM

## 2020-11-25 LAB
ALBUMIN SERPL ELPH-MCNC: 3.3 G/DL — LOW (ref 3.5–5.2)
ALP SERPL-CCNC: 78 U/L — SIGNIFICANT CHANGE UP (ref 30–115)
ALT FLD-CCNC: 15 U/L — SIGNIFICANT CHANGE UP (ref 0–41)
ANION GAP SERPL CALC-SCNC: 11 MMOL/L — SIGNIFICANT CHANGE UP (ref 7–14)
AST SERPL-CCNC: 23 U/L — SIGNIFICANT CHANGE UP (ref 0–41)
BASOPHILS # BLD AUTO: 0.03 K/UL — SIGNIFICANT CHANGE UP (ref 0–0.2)
BASOPHILS NFR BLD AUTO: 0.2 % — SIGNIFICANT CHANGE UP (ref 0–1)
BILIRUB SERPL-MCNC: 0.6 MG/DL — SIGNIFICANT CHANGE UP (ref 0.2–1.2)
BUN SERPL-MCNC: 27 MG/DL — HIGH (ref 10–20)
CALCIUM SERPL-MCNC: 8.9 MG/DL — SIGNIFICANT CHANGE UP (ref 8.5–10.1)
CHLORIDE SERPL-SCNC: 98 MMOL/L — SIGNIFICANT CHANGE UP (ref 98–110)
CO2 SERPL-SCNC: 28 MMOL/L — SIGNIFICANT CHANGE UP (ref 17–32)
CREAT SERPL-MCNC: 0.9 MG/DL — SIGNIFICANT CHANGE UP (ref 0.7–1.5)
EOSINOPHIL # BLD AUTO: 0 K/UL — SIGNIFICANT CHANGE UP (ref 0–0.7)
EOSINOPHIL NFR BLD AUTO: 0 % — SIGNIFICANT CHANGE UP (ref 0–8)
GLUCOSE SERPL-MCNC: 143 MG/DL — HIGH (ref 70–99)
HCT VFR BLD CALC: 40.8 % — LOW (ref 42–52)
HGB BLD-MCNC: 13.3 G/DL — LOW (ref 14–18)
IMM GRANULOCYTES NFR BLD AUTO: 1.4 % — HIGH (ref 0.1–0.3)
LYMPHOCYTES # BLD AUTO: 0.53 K/UL — LOW (ref 1.2–3.4)
LYMPHOCYTES # BLD AUTO: 3.7 % — LOW (ref 20.5–51.1)
MCHC RBC-ENTMCNC: 27.9 PG — SIGNIFICANT CHANGE UP (ref 27–31)
MCHC RBC-ENTMCNC: 32.6 G/DL — SIGNIFICANT CHANGE UP (ref 32–37)
MCV RBC AUTO: 85.7 FL — SIGNIFICANT CHANGE UP (ref 80–94)
MONOCYTES # BLD AUTO: 0.64 K/UL — HIGH (ref 0.1–0.6)
MONOCYTES NFR BLD AUTO: 4.4 % — SIGNIFICANT CHANGE UP (ref 1.7–9.3)
NEUTROPHILS # BLD AUTO: 13.1 K/UL — HIGH (ref 1.4–6.5)
NEUTROPHILS NFR BLD AUTO: 90.3 % — HIGH (ref 42.2–75.2)
NRBC # BLD: 0 /100 WBCS — SIGNIFICANT CHANGE UP (ref 0–0)
NT-PROBNP SERPL-SCNC: 566 PG/ML — HIGH (ref 0–300)
PLATELET # BLD AUTO: 239 K/UL — SIGNIFICANT CHANGE UP (ref 130–400)
POTASSIUM SERPL-MCNC: 5 MMOL/L — SIGNIFICANT CHANGE UP (ref 3.5–5)
POTASSIUM SERPL-SCNC: 5 MMOL/L — SIGNIFICANT CHANGE UP (ref 3.5–5)
PROT SERPL-MCNC: 6.4 G/DL — SIGNIFICANT CHANGE UP (ref 6–8)
RAPID RVP RESULT: DETECTED
RBC # BLD: 4.76 M/UL — SIGNIFICANT CHANGE UP (ref 4.7–6.1)
RBC # FLD: 14 % — SIGNIFICANT CHANGE UP (ref 11.5–14.5)
SARS-COV-2 RNA SPEC QL NAA+PROBE: DETECTED
SODIUM SERPL-SCNC: 137 MMOL/L — SIGNIFICANT CHANGE UP (ref 135–146)
TROPONIN T SERPL-MCNC: <0.01 NG/ML — SIGNIFICANT CHANGE UP
WBC # BLD: 14.5 K/UL — HIGH (ref 4.8–10.8)
WBC # FLD AUTO: 14.5 K/UL — HIGH (ref 4.8–10.8)

## 2020-11-25 PROCEDURE — 71045 X-RAY EXAM CHEST 1 VIEW: CPT | Mod: 26

## 2020-11-25 PROCEDURE — 99222 1ST HOSP IP/OBS MODERATE 55: CPT

## 2020-11-25 PROCEDURE — 99291 CRITICAL CARE FIRST HOUR: CPT

## 2020-11-25 RX ORDER — IPRATROPIUM/ALBUTEROL SULFATE 18-103MCG
3 AEROSOL WITH ADAPTER (GRAM) INHALATION ONCE
Refills: 0 | Status: COMPLETED | OUTPATIENT
Start: 2020-11-25 | End: 2020-11-25

## 2020-11-25 RX ORDER — ACETAMINOPHEN 500 MG
650 TABLET ORAL ONCE
Refills: 0 | Status: COMPLETED | OUTPATIENT
Start: 2020-11-25 | End: 2020-11-25

## 2020-11-25 RX ORDER — LEVOTHYROXINE SODIUM 125 MCG
175 TABLET ORAL DAILY
Refills: 0 | Status: DISCONTINUED | OUTPATIENT
Start: 2020-11-25 | End: 2020-12-04

## 2020-11-25 RX ORDER — PANTOPRAZOLE SODIUM 20 MG/1
40 TABLET, DELAYED RELEASE ORAL
Refills: 0 | Status: DISCONTINUED | OUTPATIENT
Start: 2020-11-25 | End: 2020-12-04

## 2020-11-25 RX ORDER — CEFTRIAXONE 500 MG/1
1000 INJECTION, POWDER, FOR SOLUTION INTRAMUSCULAR; INTRAVENOUS ONCE
Refills: 0 | Status: COMPLETED | OUTPATIENT
Start: 2020-11-25 | End: 2020-11-25

## 2020-11-25 RX ORDER — APIXABAN 2.5 MG/1
10 TABLET, FILM COATED ORAL ONCE
Refills: 0 | Status: DISCONTINUED | OUTPATIENT
Start: 2020-11-25 | End: 2020-11-25

## 2020-11-25 RX ORDER — ATORVASTATIN CALCIUM 80 MG/1
40 TABLET, FILM COATED ORAL AT BEDTIME
Refills: 0 | Status: DISCONTINUED | OUTPATIENT
Start: 2020-11-25 | End: 2020-12-04

## 2020-11-25 RX ORDER — AMLODIPINE BESYLATE 2.5 MG/1
5 TABLET ORAL DAILY
Refills: 0 | Status: DISCONTINUED | OUTPATIENT
Start: 2020-11-25 | End: 2020-11-25

## 2020-11-25 RX ORDER — IPRATROPIUM/ALBUTEROL SULFATE 18-103MCG
3 AEROSOL WITH ADAPTER (GRAM) INHALATION EVERY 6 HOURS
Refills: 0 | Status: DISCONTINUED | OUTPATIENT
Start: 2020-11-25 | End: 2020-12-03

## 2020-11-25 RX ORDER — AMLODIPINE BESYLATE 2.5 MG/1
10 TABLET ORAL DAILY
Refills: 0 | Status: DISCONTINUED | OUTPATIENT
Start: 2020-11-25 | End: 2020-12-04

## 2020-11-25 RX ORDER — MONTELUKAST 4 MG/1
10 TABLET, CHEWABLE ORAL DAILY
Refills: 0 | Status: DISCONTINUED | OUTPATIENT
Start: 2020-11-25 | End: 2020-12-04

## 2020-11-25 RX ORDER — IPRATROPIUM/ALBUTEROL SULFATE 18-103MCG
3 AEROSOL WITH ADAPTER (GRAM) INHALATION EVERY 6 HOURS
Refills: 0 | Status: DISCONTINUED | OUTPATIENT
Start: 2020-11-25 | End: 2020-11-25

## 2020-11-25 RX ORDER — AZITHROMYCIN 500 MG/1
500 TABLET, FILM COATED ORAL ONCE
Refills: 0 | Status: COMPLETED | OUTPATIENT
Start: 2020-11-25 | End: 2020-11-25

## 2020-11-25 RX ORDER — FAMOTIDINE 10 MG/ML
20 INJECTION INTRAVENOUS
Refills: 0 | Status: DISCONTINUED | OUTPATIENT
Start: 2020-11-25 | End: 2020-11-25

## 2020-11-25 RX ORDER — LEVOTHYROXINE SODIUM 125 MCG
50 TABLET ORAL DAILY
Refills: 0 | Status: DISCONTINUED | OUTPATIENT
Start: 2020-11-25 | End: 2020-11-25

## 2020-11-25 RX ADMIN — Medication 3 MILLILITER(S): at 13:14

## 2020-11-25 RX ADMIN — Medication 40 MILLIGRAM(S): at 23:06

## 2020-11-25 RX ADMIN — AZITHROMYCIN 255 MILLIGRAM(S): 500 TABLET, FILM COATED ORAL at 14:01

## 2020-11-25 RX ADMIN — AZITHROMYCIN 500 MILLIGRAM(S): 500 TABLET, FILM COATED ORAL at 14:46

## 2020-11-25 RX ADMIN — CEFTRIAXONE 100 MILLIGRAM(S): 500 INJECTION, POWDER, FOR SOLUTION INTRAMUSCULAR; INTRAVENOUS at 14:46

## 2020-11-25 RX ADMIN — Medication 650 MILLIGRAM(S): at 14:01

## 2020-11-25 RX ADMIN — CEFTRIAXONE 1000 MILLIGRAM(S): 500 INJECTION, POWDER, FOR SOLUTION INTRAMUSCULAR; INTRAVENOUS at 16:35

## 2020-11-25 RX ADMIN — Medication 125 MILLIGRAM(S): at 13:13

## 2020-11-25 RX ADMIN — Medication 650 MILLIGRAM(S): at 13:13

## 2020-11-25 RX ADMIN — Medication 3 MILLILITER(S): at 13:13

## 2020-11-25 NOTE — H&P ADULT - PROBLEM SELECTOR PLAN 3
AC/ steroids/ covid swab/ contcat / droplet isolation/ ABG/ CXR/ I d f/u/   spoke with pulmonary fellow  and signed out / pt transfer to [facility]

## 2020-11-25 NOTE — ED ADULT NURSE REASSESSMENT NOTE - NS ED NURSE REASSESS COMMENT FT1
Pt. has been on his phone most of the time he is here in the ED. Pt. verbalized that he feels better and breathes better since he arrive. No respiratory distress noted, in fact patient even states that he would rather go home than be admitted. Explained to patient the risk of his decision if he would really want to go home. After further teaching patient decided to stay. Will continue to monitor closely.

## 2020-11-25 NOTE — ED PROVIDER NOTE - OBJECTIVE STATEMENT
this is a 61 yo male presents to ed for evaluation of shortness of breath. patient states that he was diagnosed with COPD asthma. patient states he has been not feeling well for about 2 week. patient states that he spoke to pmd who prescribed him levaquin. patient has been taking it but today he felt worse. patient also reports fevers.

## 2020-11-25 NOTE — H&P ADULT - HISTORY OF PRESENT ILLNESS
Patient is a 60y old  Male who presents with a chief complaint of sob / fever - 1 week     T(F): 97.9 (11-25-20 @ 14:30), Max: 98.9 (11-25-20 @ 12:28)  HR: 114 (11-25-20 @ 14:30)  BP: 124/77 (11-25-20 @ 14:30)  RR: 22 (11-25-20 @ 14:30)  SpO2: 84% (11-25-20 @ 14:30) (78% - 84%)    PHYSICAL EXAM:  GENERAL: NAD, well-groomed, well-developed  HEAD:  Atraumatic, Normocephalic  EYES: EOMI, PERRLA, conjunctiva and sclera clear  ENMT: No tonsillar erythema, exudates, or enlargement; Moist mucous membranes, Good dentition, No lesions  NECK: Supple, No JVD, Normal thyroid  NERVOUS SYSTEM:  Alert & Oriented X3, Good concentration; Motor Strength 5/5 B/L upper and lower extremities; DTRs 2+ intact and symmetric  CHEST/LUNG: Clear to percussion bilaterally; No rales, rhonchi, wheezing, or rubs  HEART: Regular rate and rhythm; No murmurs, rubs, or gallops  ABDOMEN: Soft, Nontender, Nondistended; Bowel sounds present  EXTREMITIES:  2+ Peripheral Pulses, No clubbing, cyanosis, or edema  LYMPH: No lymphadenopathy noted  SKIN: No rashes or lesions    labs  11-25    137  |  98  |  27<H>  ----------------------------<  143<H>  5.0   |  28  |  0.9    Ca    8.9      25 Nov 2020 13:05    TPro  6.4  /  Alb  3.3<L>  /  TBili  0.6  /  DBili  x   /  AST  23  /  ALT  15  /  AlkPhos  78  11-25                          13.3   14.50 )-----------( 239      ( 25 Nov 2020 13:05 )             40.8               radiology

## 2020-11-25 NOTE — ED PROVIDER NOTE - PROGRESS NOTE DETAILS
patient is covid positive . spoke to intensivist . will see patient dr loyola evaluated patient will admit to [facility] [place]. spoke to dr connie multani results discussed with patient. will admit to hospital . patient aware

## 2020-11-25 NOTE — ED PROVIDER NOTE - PHYSICAL EXAMINATION
--EXAM--  VITAL SIGNS: I have reviewed vs documented at present.  CONSTITUTIONAL: Well-developed; well-nourished; in no acute distress.   SKIN: Warm and dry, no acute rash.   HEAD: Normocephalic; atraumatic.  EYES: PERRL, EOM intact; conjunctiva and sclera clear. No nystagmus.  ENT: No nasal discharge; airway clear.  NECK: Supple; non tender.  CARD: S1, S2, Regular rate and rhythm.   RESP: wheezes, no  rales or rhonchi.  ABD: Normal bowel sounds; soft; non-distended; non-tender.  EXT: Normal ROM.   NEURO: Alert, oriented, grossly unremarkable. Strength 5/5 in all extremities. Sensation intact throughout.  PSYCH: Cooperative, appropriate.

## 2020-11-25 NOTE — ED PROVIDER NOTE - CARE PLAN
Principal Discharge DX:	COVID-19  Secondary Diagnosis:	Shortness of breath  Secondary Diagnosis:	COPD (chronic obstructive pulmonary disease)  Secondary Diagnosis:	Hypoxia

## 2020-11-25 NOTE — ED PROVIDER NOTE - ATTENDING CONTRIBUTION TO CARE
I was present for and supervised the key and critical aspects of the procedures performed during the care of the patient. Patient with history of sever copd presents for evaluation of worsening sob and increasing dyspnea with fevers originally over the past 2 weeks. he denies any chest pain, he denies any vomiting or diarrhea patient has been working as an  at [employer]   on physical exam patient has increased work of breathing and is conversationally dyspneic.  he is nc/at perrla eomi oropharynx clear with diffuse wheezing b/l, rrr s1s2 noted abd-soft nt nd bs+ ext from with no edema at this time   a/p- patient started on supplemental o2 and has improved.  we obtained labs, cxr patient found to be covid +, based on his history and exam we have consulted ICU and will admit I held a conversation with the patient and his family, family understands and agrees with the plan of care

## 2020-11-26 LAB
CRP SERPL-MCNC: 11.63 MG/DL — HIGH (ref 0–0.4)
D DIMER BLD IA.RAPID-MCNC: 4493 NG/ML DDU — HIGH (ref 0–230)
FERRITIN SERPL-MCNC: 1855 NG/ML — HIGH (ref 30–400)
LDH SERPL L TO P-CCNC: 499 U/L — HIGH (ref 50–242)

## 2020-11-26 PROCEDURE — 99233 SBSQ HOSP IP/OBS HIGH 50: CPT

## 2020-11-26 PROCEDURE — 71045 X-RAY EXAM CHEST 1 VIEW: CPT | Mod: 26

## 2020-11-26 RX ORDER — ENOXAPARIN SODIUM 100 MG/ML
40 INJECTION SUBCUTANEOUS EVERY 12 HOURS
Refills: 0 | Status: DISCONTINUED | OUTPATIENT
Start: 2020-11-26 | End: 2020-11-27

## 2020-11-26 RX ORDER — BUDESONIDE AND FORMOTEROL FUMARATE DIHYDRATE 160; 4.5 UG/1; UG/1
2 AEROSOL RESPIRATORY (INHALATION)
Refills: 0 | Status: DISCONTINUED | OUTPATIENT
Start: 2020-11-26 | End: 2020-12-03

## 2020-11-26 RX ORDER — ALBUTEROL 90 UG/1
2.5 AEROSOL, METERED ORAL EVERY 6 HOURS
Refills: 0 | Status: DISCONTINUED | OUTPATIENT
Start: 2020-11-26 | End: 2020-12-03

## 2020-11-26 RX ORDER — ALBUTEROL 90 UG/1
1 AEROSOL, METERED ORAL EVERY 4 HOURS
Refills: 0 | Status: DISCONTINUED | OUTPATIENT
Start: 2020-11-26 | End: 2020-12-03

## 2020-11-26 RX ORDER — CHLORHEXIDINE GLUCONATE 213 G/1000ML
1 SOLUTION TOPICAL
Refills: 0 | Status: DISCONTINUED | OUTPATIENT
Start: 2020-11-26 | End: 2020-12-04

## 2020-11-26 RX ORDER — HEPARIN SODIUM 5000 [USP'U]/ML
5000 INJECTION INTRAVENOUS; SUBCUTANEOUS EVERY 12 HOURS
Refills: 0 | Status: DISCONTINUED | OUTPATIENT
Start: 2020-11-26 | End: 2020-11-26

## 2020-11-26 RX ADMIN — Medication 40 MILLIGRAM(S): at 11:43

## 2020-11-26 RX ADMIN — Medication 40 MILLIGRAM(S): at 05:17

## 2020-11-26 RX ADMIN — ATORVASTATIN CALCIUM 40 MILLIGRAM(S): 80 TABLET, FILM COATED ORAL at 21:23

## 2020-11-26 RX ADMIN — Medication 40 MILLIGRAM(S): at 18:12

## 2020-11-26 RX ADMIN — CHLORHEXIDINE GLUCONATE 1 APPLICATION(S): 213 SOLUTION TOPICAL at 11:43

## 2020-11-26 RX ADMIN — BUDESONIDE AND FORMOTEROL FUMARATE DIHYDRATE 2 PUFF(S): 160; 4.5 AEROSOL RESPIRATORY (INHALATION) at 21:23

## 2020-11-26 RX ADMIN — ENOXAPARIN SODIUM 40 MILLIGRAM(S): 100 INJECTION SUBCUTANEOUS at 18:12

## 2020-11-26 RX ADMIN — MONTELUKAST 10 MILLIGRAM(S): 4 TABLET, CHEWABLE ORAL at 11:43

## 2020-11-26 RX ADMIN — PANTOPRAZOLE SODIUM 40 MILLIGRAM(S): 20 TABLET, DELAYED RELEASE ORAL at 07:02

## 2020-11-26 RX ADMIN — Medication 3 MILLILITER(S): at 09:47

## 2020-11-26 RX ADMIN — AMLODIPINE BESYLATE 10 MILLIGRAM(S): 2.5 TABLET ORAL at 05:17

## 2020-11-26 RX ADMIN — Medication 175 MICROGRAM(S): at 05:17

## 2020-11-26 NOTE — CONSULT NOTE ADULT - ASSESSMENT
IMPRESSION:  acute resp failure hypoxic   secondary to COvid PNA       PLAN:    CNS: no sedation     HEENT: oral care     PULMONARY: taper oxygen  to keep pox > 92 %   continue steroids decadron   symbicort   try NC cannula if failed place on high flow while eating   CARDIOVASCULAR: keep is = os avoid fluid over load     GI: GI prophylaxis.  Feeding     RENAL: follow is and os     INFECTIOUS DISEASE:  id consult   for remdisivir   check procal   check d-dimer   HEMATOLOGICAL:  DVT prophylaxis. on lovenox     ENDOCRINE:  Follow up FS.  Insulin protocol if needed.    MUSCULOSKELETAL:

## 2020-11-26 NOTE — CONSULT NOTE ADULT - SUBJECTIVE AND OBJECTIVE BOX
Patient is a 60y old  Male who presents with a chief complaint of COPD / covid inf (26 Nov 2020 09:11)      HPI: this is a 59 yo male presents to ed for evaluation of shortness of breath. patient states that he was diagnosed with COPD asthma. patient states he has been not feeling well for about 2 week. patient states that he spoke to pmd who prescribed him levaquin. patient has been taking it but today he felt worse. patient also reports fevers.   [place]       PAST MEDICAL & SURGICAL HISTORY:  Borderline diabetes    COPD (chronic obstructive pulmonary disease)    HTN (hypertension)    Thyroid cancer    H/O thyroidectomy      Allergies    Allergy Status Unknown    Intolerances      Family history : no cardiovscular family history   Home Medications:  Advair Diskus: 2 puff(s) inhaled 2 times a day (07 Feb 2020 07:22)  levothyroxine 175 mcg (0.175 mg) oral tablet: 1 tab(s) orally once a day (23 Nov 2019 18:22)  omeprazole 40 mg oral delayed release capsule: 1 cap(s) orally once a day (23 Nov 2019 18:22)  pravastatin 40 mg oral tablet: 1 tab(s) orally once a day (23 Nov 2019 18:22)  Singulair 10 mg oral tablet: 1 tab(s) orally once a day (23 Nov 2019 18:22)  Spiriva 18 mcg inhalation capsule: 1 cap(s) inhaled once a day (23 Nov 2019 18:22)  Toprol-XL 25 mg oral tablet, extended release: 1 tab(s) orally once a day (23 Nov 2019 18:22)  Ventolin HFA 90 mcg/inh inhalation aerosol: 2 puff(s) inhaled 4 times a day (23 Nov 2019 18:22)    Occupation:  Alochol: Denied  Smoking: Denied  Drug Use: Denied  Marital Status:         ROS: as in HPI; All other systems reviewed are negative    ICU Vital Signs Last 24 Hrs  T(C): 35.8 (26 Nov 2020 08:00), Max: 37.2 (25 Nov 2020 12:28)  T(F): 96.4 (26 Nov 2020 08:00), Max: 98.9 (25 Nov 2020 12:28)  HR: 95 (26 Nov 2020 08:00) (95 - 144)  BP: 122/69 (26 Nov 2020 08:00) (105/51 - 140/70)  BP(mean): --  ABP: --  ABP(mean): --  RR: 22 (26 Nov 2020 09:40) (20 - 23)  SpO2: 92% (26 Nov 2020 09:40) (78% - 99%)        Physical Examination:    General: No acute distress.  Alert, oriented, interactive, nonfocal    HEENT: Pupils equal, reactive to light.  Symmetric.    PULM:  b/l crackles     CVS: Regular rate and rhythm, no murmurs, rubs, or gallops    ABD: Soft, nondistended, nontender, normoactive bowel sounds, no masses    EXT: No edema, nontender, no clubbing     SKIN: Warm and well perfused, no rashes noted.    Neurology : no motor or sensory deficit     Musculoskeletal : move all extremity     Lymphatic system: no Palpable node               I&O's Detail        LABS:                        13.3   14.50 )-----------( 239      ( 25 Nov 2020 13:05 )             40.8     25 Nov 2020 13:05    137    |  98     |  27     ----------------------------<  143    5.0     |  28     |  0.9      Ca    8.9        25 Nov 2020 13:05    TPro  6.4    /  Alb  3.3    /  TBili  0.6    /  DBili  x      /  AST  23     /  ALT  15     /  AlkPhos  78     25 Nov 2020 13:05  Amylase x     lipase x          CARDIAC MARKERS ( 25 Nov 2020 13:05 )  x     / <0.01 ng/mL / x     / x     / x          CAPILLARY BLOOD GLUCOSE            Culture        MEDICATIONS  (STANDING):  albuterol/ipratropium for Nebulization 3 milliLiter(s) Nebulizer every 6 hours  amLODIPine   Tablet 10 milliGRAM(s) Oral daily  atorvastatin 40 milliGRAM(s) Oral at bedtime  chlorhexidine 4% Liquid 1 Application(s) Topical <User Schedule>  enoxaparin Injectable 40 milliGRAM(s) SubCutaneous every 12 hours  levothyroxine 175 MICROGram(s) Oral daily  methylPREDNISolone sodium succinate Injectable 40 milliGRAM(s) IV Push every 6 hours  montelukast 10 milliGRAM(s) Oral daily  pantoprazole    Tablet 40 milliGRAM(s) Oral before breakfast    MEDICATIONS  (PRN):        RADIOLOGY: ***     CXR: b/l opacity   TLC:  OG:  ET tube:        CAM ICU:  ECHO:            CRITICAL CARE TIME SPENT: ***

## 2020-11-26 NOTE — PROGRESS NOTE ADULT - SUBJECTIVE AND OBJECTIVE BOX
----------Daily Progress Note----------    HISTORY OF PRESENT ILLNESS:  Patient is a 60y old Male who presents with a chief complaint of COPD / covid inf (25 Nov 2020 16:43)    Currently admitted to medicine with the primary diagnosis of COVID-19       Today is hospital day 1d.     INTERVAL HOSPITAL COURSE / OVERNIGHT EVENTS:    Patient was examined and seen at bedside. This morning he is resting comfortably in bed and reports no new issues or overnight events.     Review of Systems: Otherwise unremarkable     <<<<<PAST MEDICAL & SURGICAL HISTORY>>>>>  Borderline diabetes    COPD (chronic obstructive pulmonary disease)    HTN (hypertension)    Thyroid cancer    H/O thyroidectomy      ALLERGIES  Allergy Status Unknown    MEDICATIONS  STANDING MEDICATIONS  albuterol/ipratropium for Nebulization 3 milliLiter(s) Nebulizer every 6 hours  amLODIPine   Tablet 10 milliGRAM(s) Oral daily  atorvastatin 40 milliGRAM(s) Oral at bedtime  heparin   Injectable 5000 Unit(s) SubCutaneous every 12 hours  levothyroxine 175 MICROGram(s) Oral daily  methylPREDNISolone sodium succinate Injectable 40 milliGRAM(s) IV Push every 6 hours  montelukast 10 milliGRAM(s) Oral daily  pantoprazole    Tablet 40 milliGRAM(s) Oral before breakfast    PRN MEDICATIONS    VITALS:  T(F): 96.4  HR: 95  BP: 122/69  RR: 20  SpO2: 99%    <<<<<PHYSICAL EXAM>>>>>  GENERAL: Well developed, well nourished and in no acute distress. Resting comfortably in bed.  PULMONARY: Crackles heard in right lung base.  CARDIOVASCULAR: Regular rate and rhythm, S1-S2, no murmurs  GASTROINTESTINAL: Soft, non-tender, non-distended, no guarding.  SKIN/EXTREMITIES: No clubbing or edema  NEUROLOGIC/MUSCULOSKELETAL: AOx4, grossly moving all extremities, no focal deficits.    <<<<<LABS>>>>>                        13.3   14.50 )-----------( 239      ( 25 Nov 2020 13:05 )             40.8     11-25    137  |  98  |  27<H>  ----------------------------<  143<H>  5.0   |  28  |  0.9    Ca    8.9      25 Nov 2020 13:05    TPro  6.4  /  Alb  3.3<L>  /  TBili  0.6  /  DBili  x   /  AST  23  /  ALT  15  /  AlkPhos  78  11-25          Troponin T, Serum: <0.01 ng/mL (11-25-20 @ 13:05)    [id number]  CARDIAC MARKERS ( 25 Nov 2020 13:05 )  x     / <0.01 ng/mL / x     / x     / x            <<<<<RADIOLOGY>>>>>    ASSESSMENT/PLAN  59 y/o Male with PMHx of HTN, COPD (former smoker) and thyroid cancer s/p thyroidectomy presenting with fever and sob for one week.    #Acute hypoxemic respiratory failure likely due to SARS-CoV2 PNA vs. COPD exacerbation (less likely)  - no sepsis on admission  - COVID-19 PCR positive  - Xray Chest (11.25): New bilateral diffuse interstitial opacities Stable significantly enlarged cardiac mediastinal silhouette.  - ID consulted, pending recs  - f/u Inflammatory Markers  - Continue decadron 6 mg IV daily  - Continue Venturi mask, will attempt to wean, Keep Sat > 94%    #HTN    #Thyroid cancer        ----------Daily Progress Note----------    HISTORY OF PRESENT ILLNESS:  Patient is a 60y old Male who presents with a chief complaint of COPD / covid inf (25 Nov 2020 16:43)    Currently admitted to medicine with the primary diagnosis of COVID-19       Today is hospital day 1d.     INTERVAL HOSPITAL COURSE / OVERNIGHT EVENTS:    Patient was examined and seen at bedside. This morning he is resting comfortably in bed and reports no new issues or overnight events.     Review of Systems: Otherwise unremarkable     <<<<<PAST MEDICAL & SURGICAL HISTORY>>>>>  Borderline diabetes    COPD (chronic obstructive pulmonary disease)    HTN (hypertension)    Thyroid cancer    H/O thyroidectomy      ALLERGIES  Allergy Status Unknown    MEDICATIONS  STANDING MEDICATIONS  albuterol/ipratropium for Nebulization 3 milliLiter(s) Nebulizer every 6 hours  amLODIPine   Tablet 10 milliGRAM(s) Oral daily  atorvastatin 40 milliGRAM(s) Oral at bedtime  heparin   Injectable 5000 Unit(s) SubCutaneous every 12 hours  levothyroxine 175 MICROGram(s) Oral daily  methylPREDNISolone sodium succinate Injectable 40 milliGRAM(s) IV Push every 6 hours  montelukast 10 milliGRAM(s) Oral daily  pantoprazole    Tablet 40 milliGRAM(s) Oral before breakfast    PRN MEDICATIONS    VITALS:  T(F): 96.4  HR: 95  BP: 122/69  RR: 20  SpO2: 99%    <<<<<PHYSICAL EXAM>>>>>  GENERAL: Well developed, well nourished and in no acute distress. Resting comfortably in bed.  PULMONARY: Crackles heard in right lung base.  CARDIOVASCULAR: Regular rate and rhythm, S1-S2, no murmurs  GASTROINTESTINAL: Soft, non-tender, non-distended, no guarding.  SKIN/EXTREMITIES: No clubbing or edema  NEUROLOGIC/MUSCULOSKELETAL: AOx4, grossly moving all extremities, no focal deficits.    <<<<<LABS>>>>>                        13.3   14.50 )-----------( 239      ( 25 Nov 2020 13:05 )             40.8     11-25    137  |  98  |  27<H>  ----------------------------<  143<H>  5.0   |  28  |  0.9    Ca    8.9      25 Nov 2020 13:05    TPro  6.4  /  Alb  3.3<L>  /  TBili  0.6  /  DBili  x   /  AST  23  /  ALT  15  /  AlkPhos  78  11-25          Troponin T, Serum: <0.01 ng/mL (11-25-20 @ 13:05)    [id number]  CARDIAC MARKERS ( 25 Nov 2020 13:05 )  x     / <0.01 ng/mL / x     / x     / x            <<<<<RADIOLOGY>>>>>    ASSESSMENT/PLAN  61 y/o Male with PMHx of HTN, COPD (former smoker) and thyroid cancer s/p thyroidectomy presenting with fever and sob for one week.    #Acute hypoxemic respiratory failure likely due to SARS-CoV2 PNA vs. COPD exacerbation (less likely)  - no sepsis on admission  - COVID-19 PCR positive  - Xray Chest (11.25): New bilateral diffuse interstitial opacities Stable significantly enlarged cardiac mediastinal silhouette.  - ID consulted, pending recs  - f/u Inflammatory Markers  - Continue decadron 6 mg IV daily  - Continue Venturi mask, will attempt to wean, Keep Sat > 94%    #HTN - continue amlodipine 10 mg PO daily    #Thyroid cancer s/p thyroidectomy - in remission, continue to monitor    DVT ppx: lovenox 40 mg b.i.d.  GI ppx: protonix  Diet: DASH  Activity: advance as tolerated  FULL CODE    Daily contact    ----------Daily Progress Note----------    HISTORY OF PRESENT ILLNESS:  Patient is a 60y old Male who presents with a chief complaint of COPD / covid inf (25 Nov 2020 16:43)    Currently admitted to medicine with the primary diagnosis of COVID-19       Today is hospital day 1d.     INTERVAL HOSPITAL COURSE / OVERNIGHT EVENTS:    Patient was examined and seen at bedside. This morning he is resting comfortably in bed and reports no new issues or overnight events.     Review of Systems: Otherwise unremarkable     <<<<<PAST MEDICAL & SURGICAL HISTORY>>>>>  Borderline diabetes    COPD (chronic obstructive pulmonary disease)    HTN (hypertension)    Thyroid cancer    H/O thyroidectomy      ALLERGIES  Allergy Status Unknown    MEDICATIONS  STANDING MEDICATIONS  albuterol/ipratropium for Nebulization 3 milliLiter(s) Nebulizer every 6 hours  amLODIPine   Tablet 10 milliGRAM(s) Oral daily  atorvastatin 40 milliGRAM(s) Oral at bedtime  heparin   Injectable 5000 Unit(s) SubCutaneous every 12 hours  levothyroxine 175 MICROGram(s) Oral daily  methylPREDNISolone sodium succinate Injectable 40 milliGRAM(s) IV Push every 6 hours  montelukast 10 milliGRAM(s) Oral daily  pantoprazole    Tablet 40 milliGRAM(s) Oral before breakfast    PRN MEDICATIONS    VITALS:  T(F): 96.4  HR: 95  BP: 122/69  RR: 20  SpO2: 99%    <<<<<PHYSICAL EXAM>>>>>  GENERAL: Well developed, well nourished and in no acute distress. Resting comfortably in bed.  PULMONARY: Crackles heard in right lung base.  CARDIOVASCULAR: Regular rate and rhythm, S1-S2, no murmurs  GASTROINTESTINAL: Soft, non-tender, non-distended, no guarding.  SKIN/EXTREMITIES: No clubbing or edema  NEUROLOGIC/MUSCULOSKELETAL: AOx4, grossly moving all extremities, no focal deficits.    <<<<<LABS>>>>>                        13.3   14.50 )-----------( 239      ( 25 Nov 2020 13:05 )             40.8     11-25    137  |  98  |  27<H>  ----------------------------<  143<H>  5.0   |  28  |  0.9    Ca    8.9      25 Nov 2020 13:05    TPro  6.4  /  Alb  3.3<L>  /  TBili  0.6  /  DBili  x   /  AST  23  /  ALT  15  /  AlkPhos  78  11-25          Troponin T, Serum: <0.01 ng/mL (11-25-20 @ 13:05)    [id number]  CARDIAC MARKERS ( 25 Nov 2020 13:05 )  x     / <0.01 ng/mL / x     / x     / x            <<<<<RADIOLOGY>>>>>    ASSESSMENT/PLAN  61 y/o Male with PMHx of HTN, COPD (former smoker) and thyroid cancer s/p thyroidectomy presenting with fever and sob for one week.    #Acute hypoxemic respiratory failure likely due to SARS-CoV2 PNA vs. COPD exacerbation (less likely)  - no sepsis on admission  - COVID-19 PCR positive  - Xray Chest (11.25): New bilateral diffuse interstitial opacities Stable significantly enlarged cardiac mediastinal silhouette.  - ID consulted, pending recs  - f/u Inflammatory Markers  D-dimer  Ferritin    CRP  Procalc    - Continue decadron 6 mg IV daily  - Continue symbicort   - desaturated on venti mask, start HFNC 50/50, will attempt to wean, Keep Sat > 92%    #HTN - continue amlodipine 10 mg PO daily    #Thyroid cancer s/p thyroidectomy - in remission, continue to monitor    DVT ppx: lovenox 40 mg b.i.d.  GI ppx: protonix  Diet: DASH  Activity: advance as tolerated  FULL CODE    Daily contact    ----------Daily Progress Note----------    HISTORY OF PRESENT ILLNESS:  Patient is a 60y old Male who presents with a chief complaint of COPD / covid inf (25 Nov 2020 16:43)    Currently admitted to medicine with the primary diagnosis of COVID-19       Today is hospital day 1d.     INTERVAL HOSPITAL COURSE / OVERNIGHT EVENTS:    Patient was examined and seen at bedside. This morning he is resting comfortably in bed and reports no new issues or overnight events.     Review of Systems: Otherwise unremarkable     <<<<<PAST MEDICAL & SURGICAL HISTORY>>>>>  Borderline diabetes    COPD (chronic obstructive pulmonary disease)    HTN (hypertension)    Thyroid cancer    H/O thyroidectomy      ALLERGIES  Allergy Status Unknown    MEDICATIONS  STANDING MEDICATIONS  albuterol/ipratropium for Nebulization 3 milliLiter(s) Nebulizer every 6 hours  amLODIPine   Tablet 10 milliGRAM(s) Oral daily  atorvastatin 40 milliGRAM(s) Oral at bedtime  heparin   Injectable 5000 Unit(s) SubCutaneous every 12 hours  levothyroxine 175 MICROGram(s) Oral daily  methylPREDNISolone sodium succinate Injectable 40 milliGRAM(s) IV Push every 6 hours  montelukast 10 milliGRAM(s) Oral daily  pantoprazole    Tablet 40 milliGRAM(s) Oral before breakfast    PRN MEDICATIONS    VITALS:  T(F): 96.4  HR: 95  BP: 122/69  RR: 20  SpO2: 99%    <<<<<PHYSICAL EXAM>>>>>  GENERAL: Well developed, well nourished and in no acute distress. Resting comfortably in bed.  PULMONARY: Crackles heard in right lung base.  CARDIOVASCULAR: Regular rate and rhythm, S1-S2, no murmurs  GASTROINTESTINAL: Soft, non-tender, non-distended, no guarding.  SKIN/EXTREMITIES: No clubbing or edema  NEUROLOGIC/MUSCULOSKELETAL: AOx4, grossly moving all extremities, no focal deficits.    <<<<<LABS>>>>>                        13.3   14.50 )-----------( 239      ( 25 Nov 2020 13:05 )             40.8     11-25    137  |  98  |  27<H>  ----------------------------<  143<H>  5.0   |  28  |  0.9    Ca    8.9      25 Nov 2020 13:05    TPro  6.4  /  Alb  3.3<L>  /  TBili  0.6  /  DBili  x   /  AST  23  /  ALT  15  /  AlkPhos  78  11-25          Troponin T, Serum: <0.01 ng/mL (11-25-20 @ 13:05)    [id number]  CARDIAC MARKERS ( 25 Nov 2020 13:05 )  x     / <0.01 ng/mL / x     / x     / x            <<<<<RADIOLOGY>>>>>    ASSESSMENT/PLAN  61 y/o Male with PMHx of HTN, COPD (former smoker) and thyroid cancer s/p thyroidectomy presenting with fever and sob for one week.    #Acute hypoxemic respiratory failure likely due to SARS-CoV2 PNA vs. COPD exacerbation (less likely)  - no sepsis on admission  - COVID-19 PCR positive  - Xray Chest (11.25): New bilateral diffuse interstitial opacities Stable significantly enlarged cardiac mediastinal silhouette.  - ID consulted, pending recs  - f/u Inflammatory Markers  D-dimer  Ferritin    CRP  Procalc    - Continue decadron 6 mg IV daily  - Continue symbicort   - desaturated on venti mask, start HFNC 50/50, will attempt to wean, Keep Sat > 92%    #HTN - continue amlodipine 10 mg PO daily    #Thyroid cancer s/p thyroidectomy - in remission, continue to monitor    DVT ppx: lovenox 40 mg b.i.d.  GI ppx: protonix  Diet: DASH  Activity: advance as tolerated  FULL CODE    Daily contact [phone]. Spoke with spouse and provided updates.

## 2020-11-27 LAB
ALBUMIN SERPL ELPH-MCNC: 3.2 G/DL — LOW (ref 3.5–5.2)
ALP SERPL-CCNC: 71 U/L — SIGNIFICANT CHANGE UP (ref 30–115)
ALT FLD-CCNC: 14 U/L — SIGNIFICANT CHANGE UP (ref 0–41)
ANION GAP SERPL CALC-SCNC: 11 MMOL/L — SIGNIFICANT CHANGE UP (ref 7–14)
AST SERPL-CCNC: 16 U/L — SIGNIFICANT CHANGE UP (ref 0–41)
BASOPHILS # BLD AUTO: 0.02 K/UL — SIGNIFICANT CHANGE UP (ref 0–0.2)
BASOPHILS NFR BLD AUTO: 0.1 % — SIGNIFICANT CHANGE UP (ref 0–1)
BILIRUB SERPL-MCNC: 1 MG/DL — SIGNIFICANT CHANGE UP (ref 0.2–1.2)
BUN SERPL-MCNC: 48 MG/DL — HIGH (ref 10–20)
CALCIUM SERPL-MCNC: 8.8 MG/DL — SIGNIFICANT CHANGE UP (ref 8.5–10.1)
CHLORIDE SERPL-SCNC: 101 MMOL/L — SIGNIFICANT CHANGE UP (ref 98–110)
CO2 SERPL-SCNC: 28 MMOL/L — SIGNIFICANT CHANGE UP (ref 17–32)
CREAT SERPL-MCNC: 0.8 MG/DL — SIGNIFICANT CHANGE UP (ref 0.7–1.5)
EOSINOPHIL # BLD AUTO: 0 K/UL — SIGNIFICANT CHANGE UP (ref 0–0.7)
EOSINOPHIL NFR BLD AUTO: 0 % — SIGNIFICANT CHANGE UP (ref 0–8)
GLUCOSE SERPL-MCNC: 175 MG/DL — HIGH (ref 70–99)
HCT VFR BLD CALC: 39 % — LOW (ref 42–52)
HGB BLD-MCNC: 12.6 G/DL — LOW (ref 14–18)
IMM GRANULOCYTES NFR BLD AUTO: 1.7 % — HIGH (ref 0.1–0.3)
LYMPHOCYTES # BLD AUTO: 0.79 K/UL — LOW (ref 1.2–3.4)
LYMPHOCYTES # BLD AUTO: 5 % — LOW (ref 20.5–51.1)
MAGNESIUM SERPL-MCNC: 2.6 MG/DL — HIGH (ref 1.8–2.4)
MCHC RBC-ENTMCNC: 27.8 PG — SIGNIFICANT CHANGE UP (ref 27–31)
MCHC RBC-ENTMCNC: 32.3 G/DL — SIGNIFICANT CHANGE UP (ref 32–37)
MCV RBC AUTO: 86.1 FL — SIGNIFICANT CHANGE UP (ref 80–94)
MONOCYTES # BLD AUTO: 0.99 K/UL — HIGH (ref 0.1–0.6)
MONOCYTES NFR BLD AUTO: 6.2 % — SIGNIFICANT CHANGE UP (ref 1.7–9.3)
NEUTROPHILS # BLD AUTO: 13.87 K/UL — HIGH (ref 1.4–6.5)
NEUTROPHILS NFR BLD AUTO: 87 % — HIGH (ref 42.2–75.2)
NRBC # BLD: 0 /100 WBCS — SIGNIFICANT CHANGE UP (ref 0–0)
PLATELET # BLD AUTO: 222 K/UL — SIGNIFICANT CHANGE UP (ref 130–400)
POTASSIUM SERPL-MCNC: 4.8 MMOL/L — SIGNIFICANT CHANGE UP (ref 3.5–5)
POTASSIUM SERPL-SCNC: 4.8 MMOL/L — SIGNIFICANT CHANGE UP (ref 3.5–5)
PROT SERPL-MCNC: 6.1 G/DL — SIGNIFICANT CHANGE UP (ref 6–8)
RBC # BLD: 4.53 M/UL — LOW (ref 4.7–6.1)
RBC # FLD: 14.3 % — SIGNIFICANT CHANGE UP (ref 11.5–14.5)
SODIUM SERPL-SCNC: 140 MMOL/L — SIGNIFICANT CHANGE UP (ref 135–146)
WBC # BLD: 15.94 K/UL — HIGH (ref 4.8–10.8)
WBC # FLD AUTO: 15.94 K/UL — HIGH (ref 4.8–10.8)

## 2020-11-27 PROCEDURE — 93970 EXTREMITY STUDY: CPT | Mod: 26

## 2020-11-27 PROCEDURE — 99232 SBSQ HOSP IP/OBS MODERATE 35: CPT

## 2020-11-27 RX ORDER — ALBUTEROL 90 UG/1
2 AEROSOL, METERED ORAL EVERY 6 HOURS
Refills: 0 | Status: DISCONTINUED | OUTPATIENT
Start: 2020-11-27 | End: 2020-12-03

## 2020-11-27 RX ORDER — TOCILIZUMAB 20 MG/ML
400 INJECTION, SOLUTION, CONCENTRATE INTRAVENOUS ONCE
Refills: 0 | Status: COMPLETED | OUTPATIENT
Start: 2020-11-27 | End: 2020-11-27

## 2020-11-27 RX ORDER — ENOXAPARIN SODIUM 100 MG/ML
100 INJECTION SUBCUTANEOUS
Refills: 0 | Status: DISCONTINUED | OUTPATIENT
Start: 2020-11-27 | End: 2020-12-02

## 2020-11-27 RX ORDER — DEXAMETHASONE 0.5 MG/5ML
6 ELIXIR ORAL DAILY
Refills: 0 | Status: DISCONTINUED | OUTPATIENT
Start: 2020-11-27 | End: 2020-12-04

## 2020-11-27 RX ORDER — METOPROLOL TARTRATE 50 MG
25 TABLET ORAL ONCE
Refills: 0 | Status: COMPLETED | OUTPATIENT
Start: 2020-11-27 | End: 2020-11-27

## 2020-11-27 RX ORDER — ENOXAPARIN SODIUM 100 MG/ML
60 INJECTION SUBCUTANEOUS ONCE
Refills: 0 | Status: COMPLETED | OUTPATIENT
Start: 2020-11-27 | End: 2020-11-27

## 2020-11-27 RX ADMIN — ENOXAPARIN SODIUM 40 MILLIGRAM(S): 100 INJECTION SUBCUTANEOUS at 05:19

## 2020-11-27 RX ADMIN — ENOXAPARIN SODIUM 60 MILLIGRAM(S): 100 INJECTION SUBCUTANEOUS at 09:53

## 2020-11-27 RX ADMIN — Medication 40 MILLIGRAM(S): at 05:12

## 2020-11-27 RX ADMIN — Medication 175 MICROGRAM(S): at 05:12

## 2020-11-27 RX ADMIN — PANTOPRAZOLE SODIUM 40 MILLIGRAM(S): 20 TABLET, DELAYED RELEASE ORAL at 05:12

## 2020-11-27 RX ADMIN — Medication 6 MILLIGRAM(S): at 09:53

## 2020-11-27 RX ADMIN — TOCILIZUMAB 100 MILLIGRAM(S): 20 INJECTION, SOLUTION, CONCENTRATE INTRAVENOUS at 12:36

## 2020-11-27 RX ADMIN — AMLODIPINE BESYLATE 10 MILLIGRAM(S): 2.5 TABLET ORAL at 05:12

## 2020-11-27 RX ADMIN — MONTELUKAST 10 MILLIGRAM(S): 4 TABLET, CHEWABLE ORAL at 11:58

## 2020-11-27 RX ADMIN — ATORVASTATIN CALCIUM 40 MILLIGRAM(S): 80 TABLET, FILM COATED ORAL at 23:52

## 2020-11-27 RX ADMIN — ENOXAPARIN SODIUM 100 MILLIGRAM(S): 100 INJECTION SUBCUTANEOUS at 17:22

## 2020-11-27 RX ADMIN — Medication 40 MILLIGRAM(S): at 00:07

## 2020-11-27 RX ADMIN — Medication 25 MILLIGRAM(S): at 12:04

## 2020-11-27 NOTE — CONSULT NOTE ADULT - SUBJECTIVE AND OBJECTIVE BOX
NANCYJOAQUIN JAY  60y, Male  Allergy: Allergy Status Unknown      CHIEF COMPLAINT:   COPD / covid inf (27 Nov 2020 06:37)      LOS  2d    HPI  HPI:   Patient is a 60y old  Male who presents with a chief complaint of sob / fever - 1 week   SARS-CoV-2 PCR POSITIVE       PMH  PAST MEDICAL & SURGICAL HISTORY:  Borderline diabetes    COPD (chronic obstructive pulmonary disease)    HTN (hypertension)    Thyroid cancer    H/O thyroidectomy        FAMILY HISTORY  No pertinent family history in first degree relatives        SOCIAL HISTORY  Social History:      VITALS:  T(F): 98, Max: 98.7 (11-26-20 @ 17:14)  HR: 101  BP: 134/81  RR: 18Vital Signs Last 24 Hrs  T(C): 36.7 (27 Nov 2020 05:27), Max: 37.1 (26 Nov 2020 17:14)  T(F): 98 (27 Nov 2020 05:27), Max: 98.7 (26 Nov 2020 17:14)  HR: 101 (27 Nov 2020 05:27) (83 - 104)  BP: 134/81 (27 Nov 2020 05:27) (122/69 - 134/81)  BP(mean): --  RR: 18 (27 Nov 2020 05:27) (18 - 26)  SpO2: 95% (27 Nov 2020 05:27) (90% - 95%)    PHYSICAL EXAM:  Gen: obese on HFNC  HEENT: NCAT  Resp: b/l chest expansion  Neuro: nonfocal     TESTS & MEASUREMENTS:                        13.3   14.50 )-----------( 239      ( 25 Nov 2020 13:05 )             40.8     11-25    137  |  98  |  27<H>  ----------------------------<  143<H>  5.0   |  28  |  0.9    Ca    8.9      25 Nov 2020 13:05    TPro  6.4  /  Alb  3.3<L>  /  TBili  0.6  /  DBili  x   /  AST  23  /  ALT  15  /  AlkPhos  78  11-25      LIVER FUNCTIONS - ( 25 Nov 2020 13:05 )  Alb: 3.3 g/dL / Pro: 6.4 g/dL / ALK PHOS: 78 U/L / ALT: 15 U/L / AST: 23 U/L / GGT: x               Culture - Sputum (collected 02-02-20 @ 16:53)  Source: .Sputum Sputum  Gram Stain (02-03-20 @ 00:56):    Rare polymorphonuclear leukocytes per low power field    Rare Squamous epithelial cells per low power field    Numerous Gram Variable Cocci seen per oil power field  Final Report (02-04-20 @ 15:46):    Numerous Moraxella catarrhalis "Susceptibilities not performed"    Normal Respiratory Terri present            INFECTIOUS DISEASES TESTING  Procalcitonin, Serum: 0.08 ng/mL (11-26-20 @ 11:18)  Rapid RVP Result: Detected (11-25-20 @ 12:37)  Hepatitis C Virus Interpretation: Nonreact (02-02-20 @ 06:00)      INFLAMMATORY MARKERS  C-Reactive Protein, Serum: 11.63 mg/dL (11-26-20 @ 11:18)      RADIOLOGY & ADDITIONAL TESTS:  I have personally reviewed the last Chest xray  CXR      CT      CARDIOLOGY TESTING  12 Lead ECG:   Ventricular Rate 140 BPM    Atrial Rate 140 BPM    P-R Interval 118 ms    QRS Duration 128 ms    Q-T Interval 298 ms    QTC Calculation(Bazett) 454 ms    P Axis 98 degrees    R Axis -23 degrees    T Axis 21 degrees    Diagnosis Line Sinus tachycardia  Right bundle branch block  Minimal voltage criteria for LVH, may be normal variant  Abnormal ECG    Confirmed by ABIMBOLA BANDA MD ([id number]) on 11/26/2020 7:46:05 AM (11-25-20 @ 18:09)  12 Lead ECG:   Ventricular Rate 112 BPM    Atrial Rate 112 BPM    P-R Interval 116 ms    QRS Duration 112 ms    Q-T Interval 354 ms    QTC Calculation(Bazett) 483 ms    P Axis 110 degrees    R Axis -17 degrees    T Axis 19 degrees    Diagnosis Line Sinus tachycardia  Incomplete right bundle branch block  Minimal voltage criteria for LVH, may be normal variant  Borderline ECG    Confirmed by ABIMBOLA BANDA MD ([id number]) on 11/26/2020 7:45:59 AM (11-25-20 @ 12:43)      MEDICATIONS  ALBUTerol    0.083% 2.5 Nebulizer every 6 hours  ALBUTerol    90 MICROgram(s) HFA Inhaler 1 Inhalation every 4 hours  albuterol/ipratropium for Nebulization 3 Nebulizer every 6 hours  amLODIPine   Tablet 10 Oral daily  atorvastatin 40 Oral at bedtime  budesonide 160 MICROgram(s)/formoterol 4.5 MICROgram(s) Inhaler 2 Inhalation two times a day  chlorhexidine 4% Liquid 1 Topical <User Schedule>  enoxaparin Injectable 40 SubCutaneous every 12 hours  levothyroxine 175 Oral daily  methylPREDNISolone sodium succinate Injectable 40 IV Push every 6 hours  montelukast 10 Oral daily  pantoprazole    Tablet 40 Oral before breakfast      Weight  Weight (kg): 124.7 (11-25-20 @ 12:28)    ANTIBIOTICS:      ALLERGIES:  Allergy Status Unknown         NANCYJOAQUIN JAY  60y, Male  Allergy: Allergy Status Unknown      CHIEF COMPLAINT:   COPD / covid inf (27 Nov 2020 06:37)      LOS  2d    HPI  HPI:   Patient is a 60y old  Male who presents with a chief complaint of sob / fever - 1 week . Reports started not feeling well about 2 weeks ago. Cough got worse.     SARS-CoV-2 PCR POSITIVE       PMH  PAST MEDICAL & SURGICAL HISTORY:  Borderline diabetes    COPD (chronic obstructive pulmonary disease)    HTN (hypertension)    Thyroid cancer    H/O thyroidectomy        FAMILY HISTORY  No pertinent family history in first degree relatives        SOCIAL HISTORY  Social History:    ROS  General: Denies rigors, nightsweats  HEENT: Denies headache, rhinorrhea, sore throat, eye pain  CV: Denies CP, palpitations  PULM: as noted above   GI: Denies hematemesis, hematochezia, melena  : Denies discharge, hematuria  MSK: Denies arthralgias, myalgias  SKIN: Denies rash, lesions  NEURO: Denies paresthesias, weakness  PSYCH: Denies depression, anxiety    VITALS:  T(F): 98, Max: 98.7 (11-26-20 @ 17:14)  HR: 101  BP: 134/81  RR: 18Vital Signs Last 24 Hrs  T(C): 36.7 (27 Nov 2020 05:27), Max: 37.1 (26 Nov 2020 17:14)  T(F): 98 (27 Nov 2020 05:27), Max: 98.7 (26 Nov 2020 17:14)  HR: 101 (27 Nov 2020 05:27) (83 - 104)  BP: 134/81 (27 Nov 2020 05:27) (122/69 - 134/81)  BP(mean): --  RR: 18 (27 Nov 2020 05:27) (18 - 26)  SpO2: 95% (27 Nov 2020 05:27) (90% - 95%)    PHYSICAL EXAM:  Gen: obese on HFNC  HEENT: NCAT  Resp: b/l chest expansion  Neuro: nonfocal     TESTS & MEASUREMENTS:                        13.3   14.50 )-----------( 239      ( 25 Nov 2020 13:05 )             40.8     11-25    137  |  98  |  27<H>  ----------------------------<  143<H>  5.0   |  28  |  0.9    Ca    8.9      25 Nov 2020 13:05    TPro  6.4  /  Alb  3.3<L>  /  TBili  0.6  /  DBili  x   /  AST  23  /  ALT  15  /  AlkPhos  78  11-25      LIVER FUNCTIONS - ( 25 Nov 2020 13:05 )  Alb: 3.3 g/dL / Pro: 6.4 g/dL / ALK PHOS: 78 U/L / ALT: 15 U/L / AST: 23 U/L / GGT: x               Culture - Sputum (collected 02-02-20 @ 16:53)  Source: .Sputum Sputum  Gram Stain (02-03-20 @ 00:56):    Rare polymorphonuclear leukocytes per low power field    Rare Squamous epithelial cells per low power field    Numerous Gram Variable Cocci seen per oil power field  Final Report (02-04-20 @ 15:46):    Numerous Moraxella catarrhalis "Susceptibilities not performed"    Normal Respiratory Terri present            INFECTIOUS DISEASES TESTING  Procalcitonin, Serum: 0.08 ng/mL (11-26-20 @ 11:18)  Rapid RVP Result: Detected (11-25-20 @ 12:37)  Hepatitis C Virus Interpretation: Nonreact (02-02-20 @ 06:00)      INFLAMMATORY MARKERS  C-Reactive Protein, Serum: 11.63 mg/dL (11-26-20 @ 11:18)      RADIOLOGY & ADDITIONAL TESTS:  I have personally reviewed the last Chest xray  CXR      CT      CARDIOLOGY TESTING  12 Lead ECG:   Ventricular Rate 140 BPM    Atrial Rate 140 BPM    P-R Interval 118 ms    QRS Duration 128 ms    Q-T Interval 298 ms    QTC Calculation(Bazett) 454 ms    P Axis 98 degrees    R Axis -23 degrees    T Axis 21 degrees    Diagnosis Line Sinus tachycardia  Right bundle branch block  Minimal voltage criteria for LVH, may be normal variant  Abnormal ECG    Confirmed by ABIMBOLA BANDA MD ([id number]) on 11/26/2020 7:46:05 AM (11-25-20 @ 18:09)  12 Lead ECG:   Ventricular Rate 112 BPM    Atrial Rate 112 BPM    P-R Interval 116 ms    QRS Duration 112 ms    Q-T Interval 354 ms    QTC Calculation(Bazett) 483 ms    P Axis 110 degrees    R Axis -17 degrees    T Axis 19 degrees    Diagnosis Line Sinus tachycardia  Incomplete right bundle branch block  Minimal voltage criteria for LVH, may be normal variant  Borderline ECG    Confirmed by ABIMBOLA BANDA MD [id number]) on 11/26/2020 7:45:59 AM (11-25-20 @ 12:43)      MEDICATIONS  ALBUTerol    0.083% 2.5 Nebulizer every 6 hours  ALBUTerol    90 MICROgram(s) HFA Inhaler 1 Inhalation every 4 hours  albuterol/ipratropium for Nebulization 3 Nebulizer every 6 hours  amLODIPine   Tablet 10 Oral daily  atorvastatin 40 Oral at bedtime  budesonide 160 MICROgram(s)/formoterol 4.5 MICROgram(s) Inhaler 2 Inhalation two times a day  chlorhexidine 4% Liquid 1 Topical <User Schedule>  enoxaparin Injectable 40 SubCutaneous every 12 hours  levothyroxine 175 Oral daily  methylPREDNISolone sodium succinate Injectable 40 IV Push every 6 hours  montelukast 10 Oral daily  pantoprazole    Tablet 40 Oral before breakfast      Weight  Weight (kg): 124.7 (11-25-20 @ 12:28)    ANTIBIOTICS:      ALLERGIES:  Allergy Status Unknown

## 2020-11-27 NOTE — CONSULT NOTE ADULT - ASSESSMENT
ASSESSMENT  60y M pre-DM, HTN, COPD, thyroid ca admitted with COVID-19    IMPRESSION  #Severe COVID19 PNA with Sepsis on admission (RR>20 P>90 WBC 14) requiring supplemental O2     CXR bilateral opacities   #Cytokine Release Syndrome   Ferritin, Serum: 1855 ng/mL (11-26-20 @ 11:18)  C-Reactive Protein, Serum: 11.63 mg/dL (11-26-20 @ 11:18)  D-Dimer Assay, Quantitative: 4493 ng/mL DDU (11-26-20 @ 11:18)  #Morbid Obesity BMI (kg/m2): 38.4  #Pre-DM Hemoglobin A1C, Whole Blood: 6.1 (02-02-20 @ 06:00)    RECOMMENDATIONS  - Tocilizumab 400mg x1, and recheck d-dimer, crp, ferritin in 48h   - LE Duplex, rule out PE  - Remdesivir D1: 200mg x1, D2-5 100mg IV daily. Monitor Cr/LFTs  - Dexamethasone 6mg daily  - A/C per primary team   - Prone positioning if possible     If any questions, please call or send a message on [employer] Teams  Spectra [phone]   ASSESSMENT  60y M pre-DM, HTN, COPD, thyroid ca admitted with COVID-19    IMPRESSION  #Severe COVID19 PNA with Sepsis on admission (RR>20 P>90 WBC 14) requiring supplemental O2     CXR bilateral opacities   #Cytokine Release Syndrome   Ferritin, Serum: 1855 ng/mL (11-26-20 @ 11:18)  C-Reactive Protein, Serum: 11.63 mg/dL (11-26-20 @ 11:18)  D-Dimer Assay, Quantitative: 4493 ng/mL DDU (11-26-20 @ 11:18)  #Morbid Obesity BMI (kg/m2): 38.4  #Pre-DM Hemoglobin A1C, Whole Blood: 6.1 (02-02-20 @ 06:00)    RECOMMENDATIONS  - Tocilizumab 400mg x1, and recheck d-dimer, crp, ferritin in 48h   - LE Duplex, rule out PE  - Dexamethasone 6mg daily  - Given symptoms since > 14 days ago, RDV and convalescent plasma are of little benefit as past the viral replicative phase   - A/C per primary team   - Prone positioning if possible     If any questions, please call or send a message on [employer] Teams  Spectra [phone]

## 2020-11-27 NOTE — PROGRESS NOTE ADULT - SUBJECTIVE AND OBJECTIVE BOX
OVERNIGHT EVENTS: events noted, afebrile    Vital Signs Last 24 Hrs  T(C): 36.7 (27 Nov 2020 05:27), Max: 37.1 (26 Nov 2020 17:14)  T(F): 98 (27 Nov 2020 05:27), Max: 98.7 (26 Nov 2020 17:14)  HR: 101 (27 Nov 2020 05:27) (83 - 104)  BP: 134/81 (27 Nov 2020 05:27) (122/69 - 134/81)  RR: 18 (27 Nov 2020 05:27) (18 - 26)  SpO2: 95% (27 Nov 2020 05:27) (90% - 95%)    PHYSICAL EXAMINATION:    GENERAL: Ill looking     HEENT: Head is normocephalic and atraumatic.     NECK: Supple.    LUNGS: bibasilar crackles    HEART: Regular rate and rhythm without murmur.    ABDOMEN: Soft, nontender, and nondistended.      EXTREMITIES: Without any cyanosis, clubbing, rash, lesions or edema.    NEUROLOGIC: Grossly intact.    SKIN: No ulceration or induration present.      LABS:                        13.3   14.50 )-----------( 239      ( 25 Nov 2020 13:05 )             40.8     11-25    137  |  98  |  27<H>  ----------------------------<  143<H>  5.0   |  28  |  0.9    Ca    8.9      25 Nov 2020 13:05    TPro  6.4  /  Alb  3.3<L>  /  TBili  0.6  /  DBili  x   /  AST  23  /  ALT  15  /  AlkPhos  78  11-25          CARDIAC MARKERS ( 25 Nov 2020 13:05 )  x     / <0.01 ng/mL / x     / x     / x          D-Dimer Assay, Quantitative: 4493 ng/mL DDU (11-26-20 @ 11:18)    Serum Pro-Brain Natriuretic Peptide: 566 pg/mL (11-25-20 @ 13:05)      Procalcitonin, Serum: 0.08 ng/mL (11-26-20 @ 11:18)          MICROBIOLOGY:      MEDICATIONS  (STANDING):  ALBUTerol    0.083% 2.5 milliGRAM(s) Nebulizer every 6 hours  ALBUTerol    90 MICROgram(s) HFA Inhaler 1 Puff(s) Inhalation every 4 hours  albuterol/ipratropium for Nebulization 3 milliLiter(s) Nebulizer every 6 hours  amLODIPine   Tablet 10 milliGRAM(s) Oral daily  atorvastatin 40 milliGRAM(s) Oral at bedtime  budesonide 160 MICROgram(s)/formoterol 4.5 MICROgram(s) Inhaler 2 Puff(s) Inhalation two times a day  chlorhexidine 4% Liquid 1 Application(s) Topical <User Schedule>  enoxaparin Injectable 40 milliGRAM(s) SubCutaneous every 12 hours  levothyroxine 175 MICROGram(s) Oral daily  methylPREDNISolone sodium succinate Injectable 40 milliGRAM(s) IV Push every 6 hours  montelukast 10 milliGRAM(s) Oral daily  pantoprazole    Tablet 40 milliGRAM(s) Oral before breakfast    MEDICATIONS  (PRN):  ALBUTerol    90 MICROgram(s) HFA Inhaler 2 Puff(s) Inhalation every 6 hours PRN Shortness of Breath and/or Wheezing      RADIOLOGY & ADDITIONAL STUDIES:         OVERNIGHT EVENTS: events noted, afebrile, on HHFNC 80%    Vital Signs Last 24 Hrs  T(C): 36.7 (27 Nov 2020 05:27), Max: 37.1 (26 Nov 2020 17:14)  T(F): 98 (27 Nov 2020 05:27), Max: 98.7 (26 Nov 2020 17:14)  HR: 101 (27 Nov 2020 05:27) (83 - 104)  BP: 134/81 (27 Nov 2020 05:27) (122/69 - 134/81)  RR: 18 (27 Nov 2020 05:27) (18 - 26)  SpO2: 95% (27 Nov 2020 05:27) (90% - 95%)    PHYSICAL EXAMINATION:    GENERAL: Ill looking     HEENT: Head is normocephalic and atraumatic.     NECK: Supple.    LUNGS: bibasilar crackles    HEART: Regular rate and rhythm without murmur.    ABDOMEN: Soft, nontender, and nondistended.      EXTREMITIES: Without any cyanosis, clubbing, rash, lesions or edema.    NEUROLOGIC: Grossly intact.    SKIN: No ulceration or induration present.      LABS:                        13.3   14.50 )-----------( 239      ( 25 Nov 2020 13:05 )             40.8     11-25    137  |  98  |  27<H>  ----------------------------<  143<H>  5.0   |  28  |  0.9    Ca    8.9      25 Nov 2020 13:05    TPro  6.4  /  Alb  3.3<L>  /  TBili  0.6  /  DBili  x   /  AST  23  /  ALT  15  /  AlkPhos  78  11-25          CARDIAC MARKERS ( 25 Nov 2020 13:05 )  x     / <0.01 ng/mL / x     / x     / x          D-Dimer Assay, Quantitative: 4493 ng/mL DDU (11-26-20 @ 11:18)    Serum Pro-Brain Natriuretic Peptide: 566 pg/mL (11-25-20 @ 13:05)      Procalcitonin, Serum: 0.08 ng/mL (11-26-20 @ 11:18)          MICROBIOLOGY:      MEDICATIONS  (STANDING):  ALBUTerol    0.083% 2.5 milliGRAM(s) Nebulizer every 6 hours  ALBUTerol    90 MICROgram(s) HFA Inhaler 1 Puff(s) Inhalation every 4 hours  albuterol/ipratropium for Nebulization 3 milliLiter(s) Nebulizer every 6 hours  amLODIPine   Tablet 10 milliGRAM(s) Oral daily  atorvastatin 40 milliGRAM(s) Oral at bedtime  budesonide 160 MICROgram(s)/formoterol 4.5 MICROgram(s) Inhaler 2 Puff(s) Inhalation two times a day  chlorhexidine 4% Liquid 1 Application(s) Topical <User Schedule>  enoxaparin Injectable 40 milliGRAM(s) SubCutaneous every 12 hours  levothyroxine 175 MICROGram(s) Oral daily  methylPREDNISolone sodium succinate Injectable 40 milliGRAM(s) IV Push every 6 hours  montelukast 10 milliGRAM(s) Oral daily  pantoprazole    Tablet 40 milliGRAM(s) Oral before breakfast    MEDICATIONS  (PRN):  ALBUTerol    90 MICROgram(s) HFA Inhaler 2 Puff(s) Inhalation every 6 hours PRN Shortness of Breath and/or Wheezing      RADIOLOGY & ADDITIONAL STUDIES:

## 2020-11-27 NOTE — PROGRESS NOTE ADULT - SUBJECTIVE AND OBJECTIVE BOX
----------Daily Progress Note----------    HISTORY OF PRESENT ILLNESS:  Patient is a 60y old Male who presents with a chief complaint of COPD / covid inf (27 Nov 2020 07:53)    Currently admitted to medicine with the primary diagnosis of COVID-19       Today is hospital day 2d.     INTERVAL HOSPITAL COURSE / OVERNIGHT EVENTS:    Patient was examined and seen at bedside. This morning he is resting comfortably in bed and reports no new issues or overnight events.     Review of Systems: Otherwise unremarkable     <<<<<PAST MEDICAL & SURGICAL HISTORY>>>>>  Borderline diabetes    COPD (chronic obstructive pulmonary disease)    HTN (hypertension)    Thyroid cancer    H/O thyroidectomy      ALLERGIES  Allergy Status Unknown    MEDICATIONS  STANDING MEDICATIONS  ALBUTerol    0.083% 2.5 milliGRAM(s) Nebulizer every 6 hours  ALBUTerol    90 MICROgram(s) HFA Inhaler 1 Puff(s) Inhalation every 4 hours  albuterol/ipratropium for Nebulization 3 milliLiter(s) Nebulizer every 6 hours  amLODIPine   Tablet 10 milliGRAM(s) Oral daily  atorvastatin 40 milliGRAM(s) Oral at bedtime  budesonide 160 MICROgram(s)/formoterol 4.5 MICROgram(s) Inhaler 2 Puff(s) Inhalation two times a day  chlorhexidine 4% Liquid 1 Application(s) Topical <User Schedule>  dexAMETHasone  Injectable 6 milliGRAM(s) IV Push daily  enoxaparin Injectable 100 milliGRAM(s) SubCutaneous two times a day  levothyroxine 175 MICROGram(s) Oral daily  montelukast 10 milliGRAM(s) Oral daily  pantoprazole    Tablet 40 milliGRAM(s) Oral before breakfast    PRN MEDICATIONS  ALBUTerol    90 MICROgram(s) HFA Inhaler 2 Puff(s) Inhalation every 6 hours PRN    VITALS:  T(F): 97.7  HR: 106  BP: 136/79  RR: 22  SpO2: 94%    <<<<<PHYSICAL EXAM>>>>>  GENERAL: Well developed, well nourished and in no acute distress. Resting comfortably in bed.  PULMONARY: Clear to auscultation bilaterally. No rales, ronchi, or wheezing.  CARDIOVASCULAR: Regular rate and rhythm, S1-S2, no murmurs  GASTROINTESTINAL: Soft, non-tender, non-distended, no guarding.  SKIN/EXTREMITIES: No clubbing or edema  NEUROLOGIC/MUSCULOSKELETAL: AOx4, grossly moving all extremities, no focal deficits.    <<<<<LABS>>>>>                        12.6   15.94 )-----------( 222      ( 27 Nov 2020 08:23 )             39.0     11-27    140  |  101  |  48<H>  ----------------------------<  175<H>  4.8   |  28  |  0.8    Ca    8.8      27 Nov 2020 08:23  Mg     2.6     11-27    TPro  6.1  /  Alb  3.2<L>  /  TBili  1.0  /  DBili  x   /  AST  16  /  ALT  14  /  AlkPhos  71  11-27            [id number]        <<<<<RADIOLOGY>>>>>    ASSESSMENT/PLAN  61 y/o Male with PMHx of HTN, COPD (former smoker) and thyroid cancer s/p thyroidectomy presenting with fever and sob for one week.    #Acute hypoxemic respiratory failure likely due to SARS-CoV2 PNA vs. COPD exacerbation (less likely)  #cytokine storm  - no sepsis on admission, currently afebrile with leukocytosis  - COVID-19 PCR positive  - Xray Chest (11.25): New bilateral diffuse interstitial opacities Stable significantly enlarged cardiac mediastinal silhouette.  - ID consulted, no benefit for RDV  - f/u Inflammatory Markers  D-dimer 4493  Ferritin 1855    CRP 11.63  Procalc 0.08    - Decrease decadron to 4 mg IV daily  - Continue symbicort   - Give toci x 1 dose for elevated markers  - follow up duplex, therapeutic Lovenox while results pending  - continue HFNC, will attempt to wean, keep Sat > 92%    #HTN - continue amlodipine 10 mg PO daily    #Thyroid cancer s/p thyroidectomy - in remission, continue to monitor    DVT ppx: lovenox 40 mg b.i.d.  GI ppx: protonix  Diet: DASH  Activity: advance as tolerated  FULL CODE    Daily contact [phone]. Spoke with spouse and provided updates.

## 2020-11-27 NOTE — PROGRESS NOTE ADULT - ATTENDING COMMENTS
I have personally seen and examined this patient.  I have fully participated in the care of this patient.  I have reviewed pertinent clinical information, including history, physical exam, plan and note.   I have reviewed relevant imaging and diagnostic studies personally. I agree with resident note above and plan of care, edited and corrected where applicable.     .. Acute Hypoxic Respiratory Failure secondary to COVID19 pneumonia ; on high FiO2   .. Severe COVID19 infection with evidence of underlying inflammatory cytokine storm; with remarkably elevated Di-dimers on therapeutic Low-Molecular Weight Heparin (Lovenox) as per Pulmonary & Critical Care Medicine team recommendation until venous LE duplex done and resulted negative.     overall Patient remains with poor prognosis overall despite all care.   Case discussed at length with multidisciplinary team on rounds.

## 2020-11-27 NOTE — PROGRESS NOTE ADULT - ASSESSMENT
IMPRESSION:    Acute hypoxemic resp failure  COvid PNA   COPD exacerbation  no evidence of bacterial superinfection      PLAN:    CNS: no sedation     HEENT: oral care     PULMONARY: taper oxygen  to keep pox > 92 % , Decadron 6 daily    try NC cannula if failed place on high flow while eating     CARDIOVASCULAR: keep is = os avoid fluid over load     GI: GI prophylaxis.  Feeding     RENAL: follow is and os     INFECTIOUS DISEASE:  id consult   for remdisivir / toci fup infl markers     HEMATOLOGICAL:  DVT prophylaxis. on lovenox     ENDOCRINE:  Follow up FS.  Insulin protocol if needed.    MUSCULOSKELETAL: bed rest  poor prognosis IMPRESSION:    Acute hypoxemic resp failure  COvid PNA   COPD exacerbation  no evidence of bacterial superinfection      PLAN:    CNS: no sedation     HEENT: oral care     PULMONARY: taper oxygen  to keep pox > 92 % , Decadron 6 daily    try NC cannula if failed place on high flow while eating     CARDIOVASCULAR: keep is = os avoid fluid over load     GI: GI prophylaxis.  Feeding     RENAL: follow is and os     INFECTIOUS DISEASE:  id consult    toci fup infl markers     HEMATOLOGICAL:  DVT prophylaxis.  lovenox therapeutic, LE doppler    ENDOCRINE:  Follow up FS.  Insulin protocol if needed.    MUSCULOSKELETAL: bed rest  poor prognosis

## 2020-11-28 LAB
ALBUMIN SERPL ELPH-MCNC: 3.2 G/DL — LOW (ref 3.5–5.2)
ALP SERPL-CCNC: 67 U/L — SIGNIFICANT CHANGE UP (ref 30–115)
ALT FLD-CCNC: 14 U/L — SIGNIFICANT CHANGE UP (ref 0–41)
ANION GAP SERPL CALC-SCNC: 11 MMOL/L — SIGNIFICANT CHANGE UP (ref 7–14)
AST SERPL-CCNC: 17 U/L — SIGNIFICANT CHANGE UP (ref 0–41)
BASOPHILS # BLD AUTO: 0.03 K/UL — SIGNIFICANT CHANGE UP (ref 0–0.2)
BASOPHILS NFR BLD AUTO: 0.2 % — SIGNIFICANT CHANGE UP (ref 0–1)
BILIRUB SERPL-MCNC: 0.6 MG/DL — SIGNIFICANT CHANGE UP (ref 0.2–1.2)
BUN SERPL-MCNC: 43 MG/DL — HIGH (ref 10–20)
CALCIUM SERPL-MCNC: 8.8 MG/DL — SIGNIFICANT CHANGE UP (ref 8.5–10.1)
CHLORIDE SERPL-SCNC: 99 MMOL/L — SIGNIFICANT CHANGE UP (ref 98–110)
CO2 SERPL-SCNC: 28 MMOL/L — SIGNIFICANT CHANGE UP (ref 17–32)
CREAT SERPL-MCNC: 0.8 MG/DL — SIGNIFICANT CHANGE UP (ref 0.7–1.5)
CRP SERPL-MCNC: 3.55 MG/DL — HIGH (ref 0–0.4)
D DIMER BLD IA.RAPID-MCNC: 3180 NG/ML DDU — HIGH (ref 0–230)
EOSINOPHIL # BLD AUTO: 0 K/UL — SIGNIFICANT CHANGE UP (ref 0–0.7)
EOSINOPHIL NFR BLD AUTO: 0 % — SIGNIFICANT CHANGE UP (ref 0–8)
FERRITIN SERPL-MCNC: 886 NG/ML — HIGH (ref 30–400)
GLUCOSE SERPL-MCNC: 127 MG/DL — HIGH (ref 70–99)
HCT VFR BLD CALC: 38.6 % — LOW (ref 42–52)
HGB BLD-MCNC: 12.4 G/DL — LOW (ref 14–18)
IMM GRANULOCYTES NFR BLD AUTO: 1.5 % — HIGH (ref 0.1–0.3)
LDH SERPL L TO P-CCNC: 452 U/L — HIGH (ref 50–242)
LYMPHOCYTES # BLD AUTO: 0.97 K/UL — LOW (ref 1.2–3.4)
LYMPHOCYTES # BLD AUTO: 7.9 % — LOW (ref 20.5–51.1)
MAGNESIUM SERPL-MCNC: 2.4 MG/DL — SIGNIFICANT CHANGE UP (ref 1.8–2.4)
MCHC RBC-ENTMCNC: 28.1 PG — SIGNIFICANT CHANGE UP (ref 27–31)
MCHC RBC-ENTMCNC: 32.1 G/DL — SIGNIFICANT CHANGE UP (ref 32–37)
MCV RBC AUTO: 87.5 FL — SIGNIFICANT CHANGE UP (ref 80–94)
MONOCYTES # BLD AUTO: 0.92 K/UL — HIGH (ref 0.1–0.6)
MONOCYTES NFR BLD AUTO: 7.5 % — SIGNIFICANT CHANGE UP (ref 1.7–9.3)
NEUTROPHILS # BLD AUTO: 10.14 K/UL — HIGH (ref 1.4–6.5)
NEUTROPHILS NFR BLD AUTO: 82.9 % — HIGH (ref 42.2–75.2)
NRBC # BLD: 0 /100 WBCS — SIGNIFICANT CHANGE UP (ref 0–0)
PLATELET # BLD AUTO: 201 K/UL — SIGNIFICANT CHANGE UP (ref 130–400)
POTASSIUM SERPL-MCNC: 4.8 MMOL/L — SIGNIFICANT CHANGE UP (ref 3.5–5)
POTASSIUM SERPL-SCNC: 4.8 MMOL/L — SIGNIFICANT CHANGE UP (ref 3.5–5)
PROCALCITONIN SERPL-MCNC: 0.08 NG/ML — SIGNIFICANT CHANGE UP (ref 0.02–0.1)
PROT SERPL-MCNC: 5.9 G/DL — LOW (ref 6–8)
RBC # BLD: 4.41 M/UL — LOW (ref 4.7–6.1)
RBC # FLD: 14.1 % — SIGNIFICANT CHANGE UP (ref 11.5–14.5)
SODIUM SERPL-SCNC: 138 MMOL/L — SIGNIFICANT CHANGE UP (ref 135–146)
WBC # BLD: 12.24 K/UL — HIGH (ref 4.8–10.8)
WBC # FLD AUTO: 12.24 K/UL — HIGH (ref 4.8–10.8)

## 2020-11-28 PROCEDURE — 99232 SBSQ HOSP IP/OBS MODERATE 35: CPT

## 2020-11-28 PROCEDURE — 71045 X-RAY EXAM CHEST 1 VIEW: CPT | Mod: 26

## 2020-11-28 RX ADMIN — ENOXAPARIN SODIUM 100 MILLIGRAM(S): 100 INJECTION SUBCUTANEOUS at 17:53

## 2020-11-28 RX ADMIN — Medication 175 MICROGRAM(S): at 06:37

## 2020-11-28 RX ADMIN — BUDESONIDE AND FORMOTEROL FUMARATE DIHYDRATE 2 PUFF(S): 160; 4.5 AEROSOL RESPIRATORY (INHALATION) at 21:33

## 2020-11-28 RX ADMIN — PANTOPRAZOLE SODIUM 40 MILLIGRAM(S): 20 TABLET, DELAYED RELEASE ORAL at 06:37

## 2020-11-28 RX ADMIN — MONTELUKAST 10 MILLIGRAM(S): 4 TABLET, CHEWABLE ORAL at 12:15

## 2020-11-28 RX ADMIN — AMLODIPINE BESYLATE 10 MILLIGRAM(S): 2.5 TABLET ORAL at 06:37

## 2020-11-28 RX ADMIN — Medication 6 MILLIGRAM(S): at 06:36

## 2020-11-28 RX ADMIN — ATORVASTATIN CALCIUM 40 MILLIGRAM(S): 80 TABLET, FILM COATED ORAL at 21:08

## 2020-11-28 RX ADMIN — ENOXAPARIN SODIUM 100 MILLIGRAM(S): 100 INJECTION SUBCUTANEOUS at 06:37

## 2020-11-28 NOTE — PROGRESS NOTE ADULT - SUBJECTIVE AND OBJECTIVE BOX
OVERNIGHT EVENTS: events noted still on HHFNC 80%    Vital Signs Last 24 Hrs  T(C): 36.1 (28 Nov 2020 00:00), Max: 36.8 (27 Nov 2020 16:29)  T(F): 97 (28 Nov 2020 00:00), Max: 98.2 (27 Nov 2020 16:29)  HR: 83 (28 Nov 2020 04:00) (80 - 106)  BP: 153/81 (28 Nov 2020 04:00) (117/63 - 153/81)  BP(mean): 89 (27 Nov 2020 09:00) (89 - 89)  RR: 22 (28 Nov 2020 04:00) (22 - 22)  SpO2: 98% (28 Nov 2020 08:05) (94% - 99%)    PHYSICAL EXAMINATION:    GENERAL: ILL LOOKING    HEENT: Head is normocephalic and atraumatic.     NECK: Supple.    LUNGS: bl crackles    HEART: Regular rate and rhythm without murmur.    ABDOMEN: Soft, nontender, and nondistended.      EXTREMITIES: Without any cyanosis, clubbing, rash, lesions or edema.    NEUROLOGIC: Grossly intact.    SKIN: No ulceration or induration present.      LABS:                        12.4   12.24 )-----------( 201      ( 28 Nov 2020 04:30 )             38.6     11-27    140  |  101  |  48<H>  ----------------------------<  175<H>  4.8   |  28  |  0.8    Ca    8.8      27 Nov 2020 08:23  Mg     2.6     11-27    TPro  6.1  /  Alb  3.2<L>  /  TBili  1.0  /  DBili  x   /  AST  16  /  ALT  14  /  AlkPhos  71  11-27              D-Dimer Assay, Quantitative: 3180 ng/mL DDU (11-28-20 @ 04:30)    Serum Pro-Brain Natriuretic Peptide: 566 pg/mL (11-25-20 @ 13:05)      Procalcitonin, Serum: 0.08 ng/mL (11-27-20 @ 08:23)  Procalcitonin, Serum: 0.08 ng/mL (11-26-20 @ 11:18)        11-27-20 @ 07:01  -  11-28-20 @ 07:00  --------------------------------------------------------  IN: 460 mL / OUT: 1750 mL / NET: -1290 mL        MICROBIOLOGY:      MEDICATIONS  (STANDING):  ALBUTerol    0.083% 2.5 milliGRAM(s) Nebulizer every 6 hours  ALBUTerol    90 MICROgram(s) HFA Inhaler 1 Puff(s) Inhalation every 4 hours  albuterol/ipratropium for Nebulization 3 milliLiter(s) Nebulizer every 6 hours  amLODIPine   Tablet 10 milliGRAM(s) Oral daily  atorvastatin 40 milliGRAM(s) Oral at bedtime  budesonide 160 MICROgram(s)/formoterol 4.5 MICROgram(s) Inhaler 2 Puff(s) Inhalation two times a day  chlorhexidine 4% Liquid 1 Application(s) Topical <User Schedule>  dexAMETHasone  Injectable 6 milliGRAM(s) IV Push daily  enoxaparin Injectable 100 milliGRAM(s) SubCutaneous two times a day  levothyroxine 175 MICROGram(s) Oral daily  montelukast 10 milliGRAM(s) Oral daily  pantoprazole    Tablet 40 milliGRAM(s) Oral before breakfast    MEDICATIONS  (PRN):  ALBUTerol    90 MICROgram(s) HFA Inhaler 2 Puff(s) Inhalation every 6 hours PRN Shortness of Breath and/or Wheezing      RADIOLOGY & ADDITIONAL STUDIES:

## 2020-11-28 NOTE — PROGRESS NOTE ADULT - ATTENDING COMMENTS
I have personally seen and examined this patient.  I have fully participated in the care of this patient.  I have reviewed pertinent clinical information, including history, physical exam, plan and note.   I have reviewed relevant imaging and diagnostic studies personally. I agree with resident note above and plan of care, edited and corrected where applicable.     .. Acute Hypoxic Respiratory Failure secondary to COVID19 pneumonia on 80%FiO2 via High Flow Nasal Cannula (HFNC)   .. Severe COVID19 infection with evidence of underlying inflammatory cytokine storm   .. As per Pulmonary & Critical Care Medicine team; evidence of COPD exacerbation     DVT proph: on therapeutic Low-Molecular Weight Heparin (Lovenox) awaiting venous duplex as per Pulmonary & Critical Care Medicine team     Patient remains with poor prognosis overall despite all care.

## 2020-11-28 NOTE — PROGRESS NOTE ADULT - SUBJECTIVE AND OBJECTIVE BOX
----------Daily Progress Note----------    HISTORY OF PRESENT ILLNESS:  Patient is a 60y old Male who presents with a chief complaint of COPD / covid inf (28 Nov 2020 08:56)    Currently admitted to medicine with the primary diagnosis of COVID-19       Today is hospital day 3d.     INTERVAL HOSPITAL COURSE / OVERNIGHT EVENTS:    Patient was examined and seen at bedside. This morning he is resting comfortably in bed and reports no new issues or overnight events.     Review of Systems: Otherwise unremarkable     <<<<<PAST MEDICAL & SURGICAL HISTORY>>>>>  Borderline diabetes    COPD (chronic obstructive pulmonary disease)    HTN (hypertension)    Thyroid cancer    H/O thyroidectomy      ALLERGIES  Allergy Status Unknown    MEDICATIONS  STANDING MEDICATIONS  ALBUTerol    0.083% 2.5 milliGRAM(s) Nebulizer every 6 hours  ALBUTerol    90 MICROgram(s) HFA Inhaler 1 Puff(s) Inhalation every 4 hours  albuterol/ipratropium for Nebulization 3 milliLiter(s) Nebulizer every 6 hours  amLODIPine   Tablet 10 milliGRAM(s) Oral daily  atorvastatin 40 milliGRAM(s) Oral at bedtime  budesonide 160 MICROgram(s)/formoterol 4.5 MICROgram(s) Inhaler 2 Puff(s) Inhalation two times a day  chlorhexidine 4% Liquid 1 Application(s) Topical <User Schedule>  dexAMETHasone  Injectable 6 milliGRAM(s) IV Push daily  enoxaparin Injectable 100 milliGRAM(s) SubCutaneous two times a day  levothyroxine 175 MICROGram(s) Oral daily  montelukast 10 milliGRAM(s) Oral daily  pantoprazole    Tablet 40 milliGRAM(s) Oral before breakfast    PRN MEDICATIONS  ALBUTerol    90 MICROgram(s) HFA Inhaler 2 Puff(s) Inhalation every 6 hours PRN    VITALS:  T(F): 96.6  HR: 95  BP: 121/69  RR: 20  SpO2: 98%    <<<<<PHYSICAL EXAM>>>>>  GENERAL: Well developed, well nourished and in no acute distress. Resting comfortably in bed.  PULMONARY: Clear to auscultation bilaterally. No rales, ronchi, or wheezing.  CARDIOVASCULAR: Regular rate and rhythm, S1-S2, no murmurs  GASTROINTESTINAL: Soft, non-tender, non-distended, no guarding.  SKIN/EXTREMITIES: No clubbing or edema  NEUROLOGIC/MUSCULOSKELETAL: AOx4, grossly moving all extremities, no focal deficits.    <<<<<LABS>>>>>                        12.4   12.24 )-----------( 201      ( 28 Nov 2020 04:30 )             38.6     11-28    138  |  99  |  43<H>  ----------------------------<  127<H>  4.8   |  28  |  0.8    Ca    8.8      28 Nov 2020 04:30  Mg     2.4     11-28    TPro  5.9<L>  /  Alb  3.2<L>  /  TBili  0.6  /  DBili  x   /  AST  17  /  ALT  14  /  AlkPhos  67  11-28            [id number]        <<<<<RADIOLOGY>>>>>    ASSESSMENT/PLAN  59 y/o Male with PMHx of HTN, COPD (former smoker) and thyroid cancer s/p thyroidectomy presenting with fever and sob for one week.    #Acute hypoxemic respiratory failure likely due to SARS-CoV2 PNA vs. COPD exacerbation (less likely)  #cytokine storm  - no sepsis on admission, currently afebrile with leukocytosis  - COVID-19 PCR positive  - Xray Chest (11.25): New bilateral diffuse interstitial opacities Stable significantly enlarged cardiac mediastinal silhouette.  - ID consulted, no benefit for RDV  - f/u Inflammatory Markers  D-dimer 4493 > 3180  Ferritin 1855    CRP 11.63  Procalc 0.08    - S/P toci x 1 dose  - ID consulted, no benefit from RDV or plasma since past replicative phase  - Continue decadron 6 mg IV daily  - Continue symbicort   - follow up duplex, therapeutic Lovenox while results pending  - continue HFNC, will attempt to wean, keep Sat > 92%    #HTN - continue amlodipine 10 mg PO daily    #Thyroid cancer s/p thyroidectomy - in remission, continue to monitor    DVT ppx: lovenox 40 mg b.i.d.  GI ppx: protonix  Diet: DASH  Activity: advance as tolerated  FULL CODE    Daily contact [phone]. Spoke with spouse and provided updates. ----------Daily Progress Note----------    HISTORY OF PRESENT ILLNESS:  Patient is a 60y old Male who presents with a chief complaint of COPD / covid inf (28 Nov 2020 08:56)    Currently admitted to medicine with the primary diagnosis of COVID-19       Today is hospital day 3d.     INTERVAL HOSPITAL COURSE / OVERNIGHT EVENTS:    Patient was examined and seen at bedside. This morning he is resting comfortably in bed and reports no new issues or overnight events.     Review of Systems: Otherwise unremarkable     <<<<<PAST MEDICAL & SURGICAL HISTORY>>>>>  Borderline diabetes    COPD (chronic obstructive pulmonary disease)    HTN (hypertension)    Thyroid cancer    H/O thyroidectomy      ALLERGIES  Allergy Status Unknown    MEDICATIONS  STANDING MEDICATIONS  ALBUTerol    0.083% 2.5 milliGRAM(s) Nebulizer every 6 hours  ALBUTerol    90 MICROgram(s) HFA Inhaler 1 Puff(s) Inhalation every 4 hours  albuterol/ipratropium for Nebulization 3 milliLiter(s) Nebulizer every 6 hours  amLODIPine   Tablet 10 milliGRAM(s) Oral daily  atorvastatin 40 milliGRAM(s) Oral at bedtime  budesonide 160 MICROgram(s)/formoterol 4.5 MICROgram(s) Inhaler 2 Puff(s) Inhalation two times a day  chlorhexidine 4% Liquid 1 Application(s) Topical <User Schedule>  dexAMETHasone  Injectable 6 milliGRAM(s) IV Push daily  enoxaparin Injectable 100 milliGRAM(s) SubCutaneous two times a day  levothyroxine 175 MICROGram(s) Oral daily  montelukast 10 milliGRAM(s) Oral daily  pantoprazole    Tablet 40 milliGRAM(s) Oral before breakfast    PRN MEDICATIONS  ALBUTerol    90 MICROgram(s) HFA Inhaler 2 Puff(s) Inhalation every 6 hours PRN    VITALS:  T(F): 96.6  HR: 95  BP: 121/69  RR: 20  SpO2: 98%    <<<<<PHYSICAL EXAM>>>>>  GENERAL: Well developed, well nourished and in no acute distress. Resting comfortably in bed.  PULMONARY: Clear to auscultation bilaterally. No rales, ronchi, or wheezing.  CARDIOVASCULAR: Regular rate and rhythm, S1-S2, no murmurs  GASTROINTESTINAL: Soft, non-tender, non-distended, no guarding.  SKIN/EXTREMITIES: No clubbing or edema  NEUROLOGIC/MUSCULOSKELETAL: AOx4, grossly moving all extremities, no focal deficits.    <<<<<LABS>>>>>                        12.4   12.24 )-----------( 201      ( 28 Nov 2020 04:30 )             38.6     11-28    138  |  99  |  43<H>  ----------------------------<  127<H>  4.8   |  28  |  0.8    Ca    8.8      28 Nov 2020 04:30  Mg     2.4     11-28    TPro  5.9<L>  /  Alb  3.2<L>  /  TBili  0.6  /  DBili  x   /  AST  17  /  ALT  14  /  AlkPhos  67  11-28            [id number]        <<<<<RADIOLOGY>>>>>    ASSESSMENT/PLAN  59 y/o Male with PMHx of HTN, COPD (former smoker) and thyroid cancer s/p thyroidectomy presenting with fever and sob for one week.    #Acute hypoxemic respiratory failure likely due to SARS-CoV2 PNA vs. COPD exacerbation (less likely)  #cytokine storm  - no sepsis on admission, currently afebrile with leukocytosis  - COVID-19 PCR positive  - Xray Chest (11.25): New bilateral diffuse interstitial opacities Stable significantly enlarged cardiac mediastinal silhouette.  - ID consulted, no benefit for RDV  - f/u Inflammatory Markers  D-dimer 4493 > 3180  Ferritin 1855    CRP 11.63  Procalc 0.08    - S/P toci x 1 dose  - ID consulted, no benefit from RDV or plasma since past replicative phase  - Continue decadron 6 mg IV daily  - Continue symbicort   - follow up duplex, therapeutic Lovenox while results pending, can decrease dose to prophylactic if duplex negative  - continue HFNC, will attempt to wean, keep Sat > 92%    #HTN - continue amlodipine 10 mg PO daily    #Thyroid cancer s/p thyroidectomy - in remission, continue to monitor    DVT ppx: lovenox 40 mg b.i.d.  GI ppx: protonix  Diet: DASH  Activity: advance as tolerated  FULL CODE    Daily contact [phone]. Spoke with daughter and provided updates. ----------Daily Progress Note----------    HISTORY OF PRESENT ILLNESS:  Patient is a 60y old Male who presents with a chief complaint of COPD / covid inf (28 Nov 2020 08:56)    Currently admitted to medicine with the primary diagnosis of COVID-19       Today is hospital day 3d.     INTERVAL HOSPITAL COURSE / OVERNIGHT EVENTS:    Patient was examined and seen at bedside. This morning he is resting comfortably in bed and reports no new issues or overnight events.     Review of Systems: Otherwise unremarkable     <<<<<PAST MEDICAL & SURGICAL HISTORY>>>>>  Borderline diabetes    COPD (chronic obstructive pulmonary disease)    HTN (hypertension)    Thyroid cancer    H/O thyroidectomy      ALLERGIES  Allergy Status Unknown    MEDICATIONS  STANDING MEDICATIONS  ALBUTerol    0.083% 2.5 milliGRAM(s) Nebulizer every 6 hours  ALBUTerol    90 MICROgram(s) HFA Inhaler 1 Puff(s) Inhalation every 4 hours  albuterol/ipratropium for Nebulization 3 milliLiter(s) Nebulizer every 6 hours  amLODIPine   Tablet 10 milliGRAM(s) Oral daily  atorvastatin 40 milliGRAM(s) Oral at bedtime  budesonide 160 MICROgram(s)/formoterol 4.5 MICROgram(s) Inhaler 2 Puff(s) Inhalation two times a day  chlorhexidine 4% Liquid 1 Application(s) Topical <User Schedule>  dexAMETHasone  Injectable 6 milliGRAM(s) IV Push daily  enoxaparin Injectable 100 milliGRAM(s) SubCutaneous two times a day  levothyroxine 175 MICROGram(s) Oral daily  montelukast 10 milliGRAM(s) Oral daily  pantoprazole    Tablet 40 milliGRAM(s) Oral before breakfast    PRN MEDICATIONS  ALBUTerol    90 MICROgram(s) HFA Inhaler 2 Puff(s) Inhalation every 6 hours PRN    VITALS:  T(F): 96.6  HR: 95  BP: 121/69  RR: 20  SpO2: 98%    <<<<<PHYSICAL EXAM>>>>>  GENERAL: Well developed, well nourished and in no acute distress. Resting comfortably in bed.  PULMONARY: Clear to auscultation bilaterally. No rales, ronchi, or wheezing.  CARDIOVASCULAR: Regular rate and rhythm, S1-S2, no murmurs  GASTROINTESTINAL: Soft, non-tender, non-distended, no guarding.  SKIN/EXTREMITIES: No clubbing or edema  NEUROLOGIC/MUSCULOSKELETAL: AOx4, grossly moving all extremities, no focal deficits.    <<<<<LABS>>>>>                        12.4   12.24 )-----------( 201      ( 28 Nov 2020 04:30 )             38.6     11-28    138  |  99  |  43<H>  ----------------------------<  127<H>  4.8   |  28  |  0.8    Ca    8.8      28 Nov 2020 04:30  Mg     2.4     11-28    TPro  5.9<L>  /  Alb  3.2<L>  /  TBili  0.6  /  DBili  x   /  AST  17  /  ALT  14  /  AlkPhos  67  11-28            [id number]        <<<<<RADIOLOGY>>>>>    ASSESSMENT/PLAN  61 y/o Male with PMHx of HTN, COPD (former smoker) and thyroid cancer s/p thyroidectomy presenting with fever and sob for one week.    #Acute hypoxemic respiratory failure likely due to SARS-CoV2 pneumonia #cytokine storm  - no sepsis on admission, currently afebrile with leukocytosis  - COVID-19 PCR positive  - Xray Chest (11.25): New bilateral diffuse interstitial opacities Stable significantly enlarged cardiac mediastinal silhouette.  - ID consulted, no benefit for RDV  - f/u Inflammatory Markers  D-dimer 4493 > 3180  Ferritin 1855    CRP 11.63  Procalc 0.08    - S/P toci x 1 dose  - ID consulted, no benefit from RDV or plasma since past replicative phase  - Continue decadron 6 mg IV daily  - Continue symbicort   - follow up duplex, therapeutic Lovenox while results pending, can decrease dose to prophylactic if duplex negative  - continue HFNC, will attempt to wean, keep Sat > 92%    #HTN - continue amlodipine 10 mg PO daily    #Thyroid cancer s/p thyroidectomy - in remission, continue to monitor    DVT ppx: lovenox 40 mg b.i.d.  GI ppx: protonix  Diet: DASH  Activity: advance as tolerated  FULL CODE    Daily contact [phone]. Spoke with daughter and provided updates.

## 2020-11-28 NOTE — PROGRESS NOTE ADULT - ASSESSMENT
IMPRESSION:    Acute hypoxemic resp failure  COvid PNA   COPD exacerbation  no evidence of bacterial superinfection      PLAN:    CNS: no sedation     HEENT: oral care     PULMONARY: taper oxygen  to keep pox > 92 % , Decadron 6 daily    try NC cannula if failed place on high flow while eating     CARDIOVASCULAR: lasix daily    GI: GI prophylaxis.  Feeding     RENAL: follow is and os     INFECTIOUS DISEASE: ID f/up, treand inf markers     HEMATOLOGICAL:  DVT prophylaxis.  lovenox therapeutic, LE doppler    ENDOCRINE:  Follow up FS.  Insulin protocol if needed.    MUSCULOSKELETAL: bed rest  poor prognosis

## 2020-11-29 LAB
ALBUMIN SERPL ELPH-MCNC: 3.2 G/DL — LOW (ref 3.5–5.2)
ALP SERPL-CCNC: 64 U/L — SIGNIFICANT CHANGE UP (ref 30–115)
ALT FLD-CCNC: 16 U/L — SIGNIFICANT CHANGE UP (ref 0–41)
ANION GAP SERPL CALC-SCNC: 7 MMOL/L — SIGNIFICANT CHANGE UP (ref 7–14)
AST SERPL-CCNC: 20 U/L — SIGNIFICANT CHANGE UP (ref 0–41)
BASOPHILS # BLD AUTO: 0.02 K/UL — SIGNIFICANT CHANGE UP (ref 0–0.2)
BASOPHILS NFR BLD AUTO: 0.2 % — SIGNIFICANT CHANGE UP (ref 0–1)
BILIRUB SERPL-MCNC: 0.8 MG/DL — SIGNIFICANT CHANGE UP (ref 0.2–1.2)
BUN SERPL-MCNC: 33 MG/DL — HIGH (ref 10–20)
CALCIUM SERPL-MCNC: 8.2 MG/DL — LOW (ref 8.5–10.1)
CHLORIDE SERPL-SCNC: 100 MMOL/L — SIGNIFICANT CHANGE UP (ref 98–110)
CO2 SERPL-SCNC: 28 MMOL/L — SIGNIFICANT CHANGE UP (ref 17–32)
CREAT SERPL-MCNC: 0.7 MG/DL — SIGNIFICANT CHANGE UP (ref 0.7–1.5)
EOSINOPHIL # BLD AUTO: 0.12 K/UL — SIGNIFICANT CHANGE UP (ref 0–0.7)
EOSINOPHIL NFR BLD AUTO: 1.2 % — SIGNIFICANT CHANGE UP (ref 0–8)
GLUCOSE SERPL-MCNC: 263 MG/DL — HIGH (ref 70–99)
HCT VFR BLD CALC: 40.6 % — LOW (ref 42–52)
HGB BLD-MCNC: 12.8 G/DL — LOW (ref 14–18)
IMM GRANULOCYTES NFR BLD AUTO: 1.3 % — HIGH (ref 0.1–0.3)
LYMPHOCYTES # BLD AUTO: 0.39 K/UL — LOW (ref 1.2–3.4)
LYMPHOCYTES # BLD AUTO: 4 % — LOW (ref 20.5–51.1)
MAGNESIUM SERPL-MCNC: 2 MG/DL — SIGNIFICANT CHANGE UP (ref 1.8–2.4)
MCHC RBC-ENTMCNC: 28.1 PG — SIGNIFICANT CHANGE UP (ref 27–31)
MCHC RBC-ENTMCNC: 31.5 G/DL — LOW (ref 32–37)
MCV RBC AUTO: 89.2 FL — SIGNIFICANT CHANGE UP (ref 80–94)
MONOCYTES # BLD AUTO: 0.41 K/UL — SIGNIFICANT CHANGE UP (ref 0.1–0.6)
MONOCYTES NFR BLD AUTO: 4.3 % — SIGNIFICANT CHANGE UP (ref 1.7–9.3)
NEUTROPHILS # BLD AUTO: 8.56 K/UL — HIGH (ref 1.4–6.5)
NEUTROPHILS NFR BLD AUTO: 89 % — HIGH (ref 42.2–75.2)
NRBC # BLD: 0 /100 WBCS — SIGNIFICANT CHANGE UP (ref 0–0)
PLATELET # BLD AUTO: 194 K/UL — SIGNIFICANT CHANGE UP (ref 130–400)
POTASSIUM SERPL-MCNC: 4.7 MMOL/L — SIGNIFICANT CHANGE UP (ref 3.5–5)
POTASSIUM SERPL-SCNC: 4.7 MMOL/L — SIGNIFICANT CHANGE UP (ref 3.5–5)
PROT SERPL-MCNC: 5.5 G/DL — LOW (ref 6–8)
RBC # BLD: 4.55 M/UL — LOW (ref 4.7–6.1)
RBC # FLD: 13.7 % — SIGNIFICANT CHANGE UP (ref 11.5–14.5)
SODIUM SERPL-SCNC: 135 MMOL/L — SIGNIFICANT CHANGE UP (ref 135–146)
WBC # BLD: 9.63 K/UL — SIGNIFICANT CHANGE UP (ref 4.8–10.8)
WBC # FLD AUTO: 9.63 K/UL — SIGNIFICANT CHANGE UP (ref 4.8–10.8)

## 2020-11-29 PROCEDURE — 99233 SBSQ HOSP IP/OBS HIGH 50: CPT

## 2020-11-29 PROCEDURE — 99232 SBSQ HOSP IP/OBS MODERATE 35: CPT

## 2020-11-29 RX ADMIN — ATORVASTATIN CALCIUM 40 MILLIGRAM(S): 80 TABLET, FILM COATED ORAL at 21:09

## 2020-11-29 RX ADMIN — BUDESONIDE AND FORMOTEROL FUMARATE DIHYDRATE 2 PUFF(S): 160; 4.5 AEROSOL RESPIRATORY (INHALATION) at 21:09

## 2020-11-29 RX ADMIN — ENOXAPARIN SODIUM 100 MILLIGRAM(S): 100 INJECTION SUBCUTANEOUS at 05:32

## 2020-11-29 RX ADMIN — MONTELUKAST 10 MILLIGRAM(S): 4 TABLET, CHEWABLE ORAL at 12:05

## 2020-11-29 RX ADMIN — AMLODIPINE BESYLATE 10 MILLIGRAM(S): 2.5 TABLET ORAL at 05:32

## 2020-11-29 RX ADMIN — Medication 6 MILLIGRAM(S): at 05:32

## 2020-11-29 RX ADMIN — ENOXAPARIN SODIUM 100 MILLIGRAM(S): 100 INJECTION SUBCUTANEOUS at 17:12

## 2020-11-29 RX ADMIN — BUDESONIDE AND FORMOTEROL FUMARATE DIHYDRATE 2 PUFF(S): 160; 4.5 AEROSOL RESPIRATORY (INHALATION) at 07:39

## 2020-11-29 RX ADMIN — Medication 175 MICROGRAM(S): at 05:32

## 2020-11-29 RX ADMIN — PANTOPRAZOLE SODIUM 40 MILLIGRAM(S): 20 TABLET, DELAYED RELEASE ORAL at 06:17

## 2020-11-29 NOTE — PROGRESS NOTE ADULT - ASSESSMENT
.. Acute Hypoxic Respiratory Failure secondary to COVID19 pneumonia on 70%FiO2 via High Flow Nasal Cannula (HFNC)   .. Severe COVID19 infection with evidence of underlying inflammatory cytokine storm   .. As per Pulmonary & Critical Care Medicine team; evidence of COPD exacerbation on therapy   .. No evidence of acute DVT on LE duplex; remains on Low-Molecular Weight Heparin (Lovenox) at therapeutic as per Pulmonary & Critical Care Medicine team.     PLAN:  ·	Attempting to downgrade to NC O2 while at rest if tolerated  ·	DVT prophylaxis as per Pulmonary & Critical Care Medicine team   ·	bronchodilators on board + intravenous  steroids   ·	Calcium Channel Blocker for BP control    Patient remains with poor prognosis overall despite all care.

## 2020-11-29 NOTE — PROGRESS NOTE ADULT - SUBJECTIVE AND OBJECTIVE BOX
OVERNIGHT EVENTS: events noted, still requiring high FIO2, afebrile    Vital Signs Last 24 Hrs  T(C): 36.2 (29 Nov 2020 05:30), Max: 36.7 (28 Nov 2020 20:07)  T(F): 97.2 (29 Nov 2020 05:30), Max: 98 (28 Nov 2020 20:07)  HR: 93 (29 Nov 2020 05:30) (80 - 96)  BP: 123/82 (29 Nov 2020 05:30) (116/63 - 146/71)  BP(mean): 99 (28 Nov 2020 16:35) (80 - 99)  RR: 22 (29 Nov 2020 05:30) (20 - 22)  SpO2: 95% (29 Nov 2020 05:30) (95% - 98%)    PHYSICAL EXAMINATION:    GENERAL: ill looking    HEENT: Head is normocephalic and atraumatic.     NECK: Supple.    LUNGS: bl crackles    HEART: Regular rate and rhythm without murmur.    ABDOMEN: Soft, nontender, and nondistended.      EXTREMITIES: Without any cyanosis, clubbing, rash, lesions or edema.    NEUROLOGIC: Grossly intact.    SKIN: No ulceration or induration present.      LABS:                        12.4   12.24 )-----------( 201      ( 28 Nov 2020 04:30 )             38.6     11-28    138  |  99  |  43<H>  ----------------------------<  127<H>  4.8   |  28  |  0.8    Ca    8.8      28 Nov 2020 04:30  Mg     2.4     11-28    TPro  5.9<L>  /  Alb  3.2<L>  /  TBili  0.6  /  DBili  x   /  AST  17  /  ALT  14  /  AlkPhos  67  11-28                    Procalcitonin, Serum: 0.08 ng/mL (11-27-20 @ 08:23)  Procalcitonin, Serum: 0.08 ng/mL (11-26-20 @ 11:18)        11-28-20 @ 07:01  -  11-29-20 @ 07:00  --------------------------------------------------------  IN: 0 mL / OUT: 1100 mL / NET: -1100 mL        MICROBIOLOGY:      MEDICATIONS  (STANDING):  ALBUTerol    0.083% 2.5 milliGRAM(s) Nebulizer every 6 hours  ALBUTerol    90 MICROgram(s) HFA Inhaler 1 Puff(s) Inhalation every 4 hours  albuterol/ipratropium for Nebulization 3 milliLiter(s) Nebulizer every 6 hours  amLODIPine   Tablet 10 milliGRAM(s) Oral daily  atorvastatin 40 milliGRAM(s) Oral at bedtime  budesonide 160 MICROgram(s)/formoterol 4.5 MICROgram(s) Inhaler 2 Puff(s) Inhalation two times a day  chlorhexidine 4% Liquid 1 Application(s) Topical <User Schedule>  dexAMETHasone  Injectable 6 milliGRAM(s) IV Push daily  enoxaparin Injectable 100 milliGRAM(s) SubCutaneous two times a day  levothyroxine 175 MICROGram(s) Oral daily  montelukast 10 milliGRAM(s) Oral daily  pantoprazole    Tablet 40 milliGRAM(s) Oral before breakfast    MEDICATIONS  (PRN):  ALBUTerol    90 MICROgram(s) HFA Inhaler 2 Puff(s) Inhalation every 6 hours PRN Shortness of Breath and/or Wheezing      RADIOLOGY & ADDITIONAL STUDIES:         OVERNIGHT EVENTS: events noted, still requiring high FIO2 70%, afebrile    Vital Signs Last 24 Hrs  T(C): 36.2 (29 Nov 2020 05:30), Max: 36.7 (28 Nov 2020 20:07)  T(F): 97.2 (29 Nov 2020 05:30), Max: 98 (28 Nov 2020 20:07)  HR: 93 (29 Nov 2020 05:30) (80 - 96)  BP: 123/82 (29 Nov 2020 05:30) (116/63 - 146/71)  BP(mean): 99 (28 Nov 2020 16:35) (80 - 99)  RR: 22 (29 Nov 2020 05:30) (20 - 22)  SpO2: 95% (29 Nov 2020 05:30) (95% - 98%)    PHYSICAL EXAMINATION:    GENERAL: ill looking    HEENT: Head is normocephalic and atraumatic.     NECK: Supple.    LUNGS: bl crackles    HEART: Regular rate and rhythm without murmur.    ABDOMEN: Soft, nontender, and nondistended.      EXTREMITIES: Without any cyanosis, clubbing, rash, lesions or edema.    NEUROLOGIC: Grossly intact.    SKIN: No ulceration or induration present.      LABS:                        12.4   12.24 )-----------( 201      ( 28 Nov 2020 04:30 )             38.6     11-28    138  |  99  |  43<H>  ----------------------------<  127<H>  4.8   |  28  |  0.8    Ca    8.8      28 Nov 2020 04:30  Mg     2.4     11-28    TPro  5.9<L>  /  Alb  3.2<L>  /  TBili  0.6  /  DBili  x   /  AST  17  /  ALT  14  /  AlkPhos  67  11-28                    Procalcitonin, Serum: 0.08 ng/mL (11-27-20 @ 08:23)  Procalcitonin, Serum: 0.08 ng/mL (11-26-20 @ 11:18)        11-28-20 @ 07:01  -  11-29-20 @ 07:00  --------------------------------------------------------  IN: 0 mL / OUT: 1100 mL / NET: -1100 mL        MICROBIOLOGY:      MEDICATIONS  (STANDING):  ALBUTerol    0.083% 2.5 milliGRAM(s) Nebulizer every 6 hours  ALBUTerol    90 MICROgram(s) HFA Inhaler 1 Puff(s) Inhalation every 4 hours  albuterol/ipratropium for Nebulization 3 milliLiter(s) Nebulizer every 6 hours  amLODIPine   Tablet 10 milliGRAM(s) Oral daily  atorvastatin 40 milliGRAM(s) Oral at bedtime  budesonide 160 MICROgram(s)/formoterol 4.5 MICROgram(s) Inhaler 2 Puff(s) Inhalation two times a day  chlorhexidine 4% Liquid 1 Application(s) Topical <User Schedule>  dexAMETHasone  Injectable 6 milliGRAM(s) IV Push daily  enoxaparin Injectable 100 milliGRAM(s) SubCutaneous two times a day  levothyroxine 175 MICROGram(s) Oral daily  montelukast 10 milliGRAM(s) Oral daily  pantoprazole    Tablet 40 milliGRAM(s) Oral before breakfast    MEDICATIONS  (PRN):  ALBUTerol    90 MICROgram(s) HFA Inhaler 2 Puff(s) Inhalation every 6 hours PRN Shortness of Breath and/or Wheezing      RADIOLOGY & ADDITIONAL STUDIES:

## 2020-11-29 NOTE — PROGRESS NOTE ADULT - SUBJECTIVE AND OBJECTIVE BOX
MARY, JOAQUIN  Ilanay, Male  Allergy: Allergy Status Unknown    Hospital Day: 4d    Patient seen and examined earlier today.     LAST 24-Hr EVENTS:  NO fever  on High Flow Nasal Cannula (HFNC) still    VITALS:  T(F): 98.2 (11-29-20 @ 12:19), Max: 98.2 (11-29-20 @ 12:19)  HR: 102 (11-29-20 @ 12:19)  BP: 143/83 (11-29-20 @ 12:19) (116/63 - 143/83)  RR: 20 (11-29-20 @ 12:19)  SpO2: 94% (11-29-20 @ 08:35) on 70% FiO2        TESTS & MEASUREMENTS:  Weight (Kg):   BMI (kg/m2): 38.4 (11-25)    11-27-20 @ 07:01  -  11-28-20 @ 07:00  --------------------------------------------------------  IN: 460 mL / OUT: 1750 mL / NET: -1290 mL    11-28-20 @ 07:01  -  11-29-20 @ 07:00  --------------------------------------------------------  IN: 0 mL / OUT: 1100 mL / NET: -1100 mL    11-29-20 @ 07:01  -  11-29-20 @ 12:50  --------------------------------------------------------  IN: 0 mL / OUT: 300 mL / NET: -300 mL                            12.8   9.63  )-----------( 194      ( 29 Nov 2020 09:43 )             40.6       11-29    135  |  100  |  33<H>  ----------------------------<  263<H>  4.7   |  28  |  0.7    Ca    8.2<L>      29 Nov 2020 09:43  Mg     2.0     11-29    TPro  5.5<L>  /  Alb  3.2<L>  /  TBili  0.8  /  DBili  x   /  AST  20  /  ALT  16  /  AlkPhos  64  11-29    LIVER FUNCTIONS - ( 29 Nov 2020 09:43 )  Alb: 3.2 g/dL / Pro: 5.5 g/dL / ALK PHOS: 64 U/L / ALT: 16 U/L / AST: 20 U/L / GGT: x             Procalcitonin, Serum: 0.08 ng/mL (11-27-20 @ 08:23)  Procalcitonin, Serum: 0.08 ng/mL (11-26-20 @ 11:18)    D-Dimer Assay, Quantitative: 3180 ng/mL DDU (11-28-20 @ 04:30)  D-Dimer Assay, Quantitative: 4493 ng/mL DDU (11-26-20 @ 11:18)    Ferritin, Serum: 886 ng/mL (11-28-20 @ 07:00)  Ferritin, Serum: 1855 ng/mL (11-26-20 @ 11:18)    Serum Pro-Brain Natriuretic Peptide: 566 pg/mL (11-25-20 @ 13:05)        RADIOLOGY, ECG, & ADDITIONAL TESTS:  < from: [facility] Duplex Lower Ext Vein Scan, Bilat (11.27.20 @ 17:19) >  No evidence of deep venous thrombosis or superficial thrombophlebitis in the bilateral lower extremities.      MEDICATIONS:  MEDICATIONS  (STANDING):  ALBUTerol    0.083% 2.5 milliGRAM(s) Nebulizer every 6 hours  ALBUTerol    90 MICROgram(s) HFA Inhaler 1 Puff(s) Inhalation every 4 hours  albuterol/ipratropium for Nebulization 3 milliLiter(s) Nebulizer every 6 hours  amLODIPine   Tablet 10 milliGRAM(s) Oral daily  atorvastatin 40 milliGRAM(s) Oral at bedtime  budesonide 160 MICROgram(s)/formoterol 4.5 MICROgram(s) Inhaler 2 Puff(s) Inhalation two times a day  chlorhexidine 4% Liquid 1 Application(s) Topical <User Schedule>  dexAMETHasone  Injectable 6 milliGRAM(s) IV Push daily  enoxaparin Injectable 100 milliGRAM(s) SubCutaneous two times a day  levothyroxine 175 MICROGram(s) Oral daily  montelukast 10 milliGRAM(s) Oral daily  pantoprazole    Tablet 40 milliGRAM(s) Oral before breakfast    MEDICATIONS  (PRN):  ALBUTerol    90 MICROgram(s) HFA Inhaler 2 Puff(s) Inhalation every 6 hours PRN Shortness of Breath and/or Wheezing      HOME MEDICATIONS:  Advair Diskus (02-07)  levothyroxine 175 mcg (0.175 mg) oral tablet (11-23)  omeprazole 40 mg oral delayed release capsule (11-23)  pravastatin 40 mg oral tablet (11-23)  Singulair 10 mg oral tablet (11-23)  Spiriva 18 mcg inhalation capsule (11-23)  Toprol-XL 25 mg oral tablet, extended release (11-23)  Ventolin HFA 90 mcg/inh inhalation aerosol (11-23)      PHYSICAL EXAM:  GENERAL: In NAD/P while at rest  CHEST/LUNG: good air entry (ant ausc)  HEART: S1S2  ABDOMEN: Obese/ soft  EXTREMITIES:  Chronic skin changes

## 2020-11-29 NOTE — PROGRESS NOTE ADULT - ASSESSMENT
IMPRESSION:    Acute hypoxemic resp failure  COvid PNA   COPD exacerbation  no evidence of bacterial superinfection  high D Dimer      PLAN:    CNS: no sedation     HEENT: oral care     PULMONARY: taper oxygen  to keep pox 92 to 96%, HHFNC    try NC cannula if failed place on high flow while eating     CARDIOVASCULAR: lasix daily    GI: GI prophylaxis.  Feeding     RENAL: follow is and os     INFECTIOUS DISEASE: ID f/up, treand inf markers     HEMATOLOGICAL:  DVT prophylaxis.  lovenox therapeutic, LE doppler REVIEWED    ENDOCRINE:  Follow up FS.  Insulin protocol if needed.    MUSCULOSKELETAL: bed rest  poor prognosis IMPRESSION:    Acute hypoxemic resp failure  COvid PNA   COPD exacerbation  no evidence of bacterial superinfection  high D Dimer      PLAN:    CNS: no sedation     HEENT: oral care     PULMONARY: taper oxygen  to keep pox 92 to 96%, HHFNC dec to 60%    try NC cannula if failed place on high flow while eating     CARDIOVASCULAR: lasix daily    GI: GI prophylaxis.  Feeding     RENAL: follow is and os     INFECTIOUS DISEASE: ID f/up, treand inf markers     HEMATOLOGICAL:  DVT prophylaxis.  lovenox therapeutic, LE doppler REVIEWED    ENDOCRINE:  Follow up FS.  Insulin protocol if needed.    MUSCULOSKELETAL: bed rest  poor prognosis

## 2020-11-30 PROCEDURE — 99232 SBSQ HOSP IP/OBS MODERATE 35: CPT

## 2020-11-30 RX ORDER — FUROSEMIDE 40 MG
40 TABLET ORAL ONCE
Refills: 0 | Status: COMPLETED | OUTPATIENT
Start: 2020-11-30 | End: 2020-11-30

## 2020-11-30 RX ADMIN — ALBUTEROL 2.5 MILLIGRAM(S): 90 AEROSOL, METERED ORAL at 21:12

## 2020-11-30 RX ADMIN — ENOXAPARIN SODIUM 100 MILLIGRAM(S): 100 INJECTION SUBCUTANEOUS at 17:24

## 2020-11-30 RX ADMIN — PANTOPRAZOLE SODIUM 40 MILLIGRAM(S): 20 TABLET, DELAYED RELEASE ORAL at 06:15

## 2020-11-30 RX ADMIN — AMLODIPINE BESYLATE 10 MILLIGRAM(S): 2.5 TABLET ORAL at 05:02

## 2020-11-30 RX ADMIN — ATORVASTATIN CALCIUM 40 MILLIGRAM(S): 80 TABLET, FILM COATED ORAL at 21:13

## 2020-11-30 RX ADMIN — BUDESONIDE AND FORMOTEROL FUMARATE DIHYDRATE 2 PUFF(S): 160; 4.5 AEROSOL RESPIRATORY (INHALATION) at 09:44

## 2020-11-30 RX ADMIN — Medication 3 MILLILITER(S): at 14:14

## 2020-11-30 RX ADMIN — MONTELUKAST 10 MILLIGRAM(S): 4 TABLET, CHEWABLE ORAL at 12:54

## 2020-11-30 RX ADMIN — BUDESONIDE AND FORMOTEROL FUMARATE DIHYDRATE 2 PUFF(S): 160; 4.5 AEROSOL RESPIRATORY (INHALATION) at 21:12

## 2020-11-30 RX ADMIN — ENOXAPARIN SODIUM 100 MILLIGRAM(S): 100 INJECTION SUBCUTANEOUS at 05:01

## 2020-11-30 RX ADMIN — Medication 3 MILLILITER(S): at 09:14

## 2020-11-30 RX ADMIN — ALBUTEROL 2.5 MILLIGRAM(S): 90 AEROSOL, METERED ORAL at 09:43

## 2020-11-30 RX ADMIN — Medication 6 MILLIGRAM(S): at 05:01

## 2020-11-30 RX ADMIN — Medication 40 MILLIGRAM(S): at 10:16

## 2020-11-30 RX ADMIN — ALBUTEROL 2.5 MILLIGRAM(S): 90 AEROSOL, METERED ORAL at 14:14

## 2020-11-30 RX ADMIN — Medication 175 MICROGRAM(S): at 05:30

## 2020-11-30 NOTE — PROGRESS NOTE ADULT - ATTENDING COMMENTS
I have personally seen and examined this patient.  I have fully participated in the care of this patient.  I have reviewed pertinent clinical information, including history, physical exam, plan and note.   I have reviewed relevant imaging and diagnostic studies personally. I agree with resident note above and plan of care, edited and corrected where applicable.     .. Acute Hypoxic Respiratory Failure secondary to COVID19 pneumonia on 70%FiO2 via High Flow Nasal Cannula (HFNC)   .. Severe COVID19 infection with evidence of underlying inflammatory cytokine storm   .. As per Pulmonary & Critical Care Medicine team; evidence of COPD exacerbation on therapy ; improving  .. No evidence of acute DVT on LE duplex; on Low-Molecular Weight Heparin (Lovenox) at therapeutic as per Pulmonary & Critical Care Medicine team.     PLAN:  ·Goal is to lower FiO2% if possible and tolerated  ·DVT prophylaxis as per Pulmonary & Critical Care Medicine team   ·bronchodilators on board + intravenous  steroids   ·Calcium Channel Blocker for BP control  Patient remains with poor prognosis overall despite all care. I have personally seen and examined this patient.  I have fully participated in the care of this patient.  I have reviewed pertinent clinical information, including history, physical exam, plan and note.   I have reviewed relevant imaging and diagnostic studies personally. I agree with resident note above and plan of care, edited and corrected where applicable.     .. Acute Hypoxic Respiratory Failure secondary to COVID19 pneumonia on 40%FiO2 via High Flow Nasal Cannula (HFNC)   .. Severe COVID19 infection with evidence of underlying inflammatory cytokine storm   .. As per Pulmonary & Critical Care Medicine team; evidence of COPD exacerbation on therapy ; improving  .. No evidence of acute DVT on LE duplex; on Low-Molecular Weight Heparin (Lovenox) at therapeutic as per Pulmonary & Critical Care Medicine team.     PLAN:  ·Goal is to lower FiO2% if possible and tolerated  ·DVT prophylaxis as per Pulmonary & Critical Care Medicine team   ·bronchodilators on board + intravenous  steroids   ·Calcium Channel Blocker for BP control  Patient remains with poor prognosis overall despite all care.

## 2020-11-30 NOTE — PROGRESS NOTE ADULT - ASSESSMENT
ASSESSMENT  60y M pre-DM, HTN, COPD, thyroid ca admitted with COVID-19    IMPRESSION  #Severe COVID19 PNA with Sepsis on admission (RR>20 P>90 WBC 14) requiring supplemental O2     CXR bilateral opacities     Procalcitonin, Serum: 0.08 (11-27-20 @ 08:23)    LE Duplex NEGATIVE   #Cytokine Release Syndrome   Ferritin, Serum: 886 ng/mL (11-28-20 @ 07:00)  C-Reactive Protein, Serum: 3.55 mg/dL (11-28-20 @ 04:30) <--CRP 11  D-Dimer Assay, Quantitative: 3180 ng/mL DDU (11-28-20 @ 04:30) <--4493  #Morbid Obesity BMI (kg/m2): 38.4  #Pre-DM Hemoglobin A1C, Whole Blood: 6.1 (02-02-20 @ 06:00)    RECOMMENDATIONS  - s/p 11/27 Tocilizumab 400mg x1  - Dexamethasone 6mg daily x 10 days or until discharge, whichever is longer  - A/C per primary team   - Prone positioning if possible     If any questions, please call or send a message on [employer] Teams  Spectra [phone]

## 2020-11-30 NOTE — PROGRESS NOTE ADULT - SUBJECTIVE AND OBJECTIVE BOX
Patient is a 60y old  Male who presents with a chief complaint of COPD / covid inf (30 Nov 2020 14:32)        Over Night Events: Remains on HFNCO2 50 % 40 liters.          ROS:     All ROS are negative except HPI         PHYSICAL EXAM    ICU Vital Signs Last 24 Hrs  T(C): 36.9 (30 Nov 2020 16:00), Max: 36.9 (30 Nov 2020 16:00)  T(F): 98.5 (30 Nov 2020 16:00), Max: 98.5 (30 Nov 2020 16:00)  HR: 86 (30 Nov 2020 16:00) (86 - 94)  BP: 131/75 (30 Nov 2020 16:00) (121/85 - 152/86)  BP(mean): --  ABP: --  ABP(mean): --  RR: 24 (30 Nov 2020 16:00) (20 - 24)  SpO2: 95% (30 Nov 2020 16:00) (91% - 98%)      CONSTITUTIONAL:  Well nourished.  NAD    ENT:   Airway patent,   Mouth with normal mucosa.   No thrush    EYES:   Pupils equal,   Round and reactive to light.    CARDIAC:   Normal rate,   Regular rhythm.    No edema      Vascular:  Normal systolic impulse  No Carotid bruits    RESPIRATORY:   No wheezing  Bilateral BS  Normal chest expansion  Not tachypneic,  No use of accessory muscles    GASTROINTESTINAL:  Abdomen soft,   Non-tender,   No guarding,   + BS    MUSCULOSKELETAL:   Range of motion is not limited,  No clubbing, cyanosis    NEUROLOGICAL:   Alert and oriented   No motor  deficits.    SKIN:   Skin normal color for race,   Warm and dry and intact.   No evidence of rash.    PSYCHIATRIC:   Normal mood and affect.   No apparent risk to self or others.    HEMATOLOGICAL:  No cervical  lymphadenopathy.  no inguinal lymphadenopathy      11-29-20 @ 07:01  -  11-30-20 @ 07:00  --------------------------------------------------------  IN:    Oral Fluid: 480 mL  Total IN: 480 mL    OUT:    Voided (mL): 900 mL  Total OUT: 900 mL    Total NET: -420 mL      11-30-20 @ 07:01  -  11-30-20 @ 19:47  --------------------------------------------------------  IN:    Oral Fluid: 600 mL  Total IN: 600 mL    OUT:    Voided (mL): 1200 mL  Total OUT: 1200 mL    Total NET: -600 mL          LABS:                            12.8   9.63  )-----------( 194      ( 29 Nov 2020 09:43 )             40.6                                               11-29    135  |  100  |  33<H>  ----------------------------<  263<H>  4.7   |  28  |  0.7    Ca    8.2<L>      29 Nov 2020 09:43  Mg     2.0     11-29    TPro  5.5<L>  /  Alb  3.2<L>  /  TBili  0.8  /  DBili  x   /  AST  20  /  ALT  16  /  AlkPhos  64  11-29                                                                                           LIVER FUNCTIONS - ( 29 Nov 2020 09:43 )  Alb: 3.2 g/dL / Pro: 5.5 g/dL / ALK PHOS: 64 U/L / ALT: 16 U/L / AST: 20 U/L / GGT: x                                                                                                                                       MEDICATIONS  (STANDING):  ALBUTerol    0.083% 2.5 milliGRAM(s) Nebulizer every 6 hours  ALBUTerol    90 MICROgram(s) HFA Inhaler 1 Puff(s) Inhalation every 4 hours  albuterol/ipratropium for Nebulization 3 milliLiter(s) Nebulizer every 6 hours  amLODIPine   Tablet 10 milliGRAM(s) Oral daily  atorvastatin 40 milliGRAM(s) Oral at bedtime  budesonide 160 MICROgram(s)/formoterol 4.5 MICROgram(s) Inhaler 2 Puff(s) Inhalation two times a day  chlorhexidine 4% Liquid 1 Application(s) Topical <User Schedule>  dexAMETHasone  Injectable 6 milliGRAM(s) IV Push daily  enoxaparin Injectable 100 milliGRAM(s) SubCutaneous two times a day  levothyroxine 175 MICROGram(s) Oral daily  montelukast 10 milliGRAM(s) Oral daily  pantoprazole    Tablet 40 milliGRAM(s) Oral before breakfast    MEDICATIONS  (PRN):  ALBUTerol    90 MICROgram(s) HFA Inhaler 2 Puff(s) Inhalation every 6 hours PRN Shortness of Breath and/or Wheezing      New X-rays reviewed:                                                                                  ECHO    CXR interpreted by me:

## 2020-11-30 NOTE — PROGRESS NOTE ADULT - SUBJECTIVE AND OBJECTIVE BOX
MARYJOAQUIN  60y, Male  Allergy: Allergy Status Unknown      LOS  5d    CHIEF COMPLAINT: COPD / covid inf (29 Nov 2020 07:25)      INTERVAL EVENTS/HPI  - HFNC  - T(F): , Max: 98.2 (11-29-20 @ 12:19)  - WBC Count: 9.63 (11-29-20 @ 09:43)  WBC Count: 12.24 (11-28-20 @ 04:30)  - Creatinine, Serum: 0.7 (11-29-20 @ 09:43)         VITALS:  T(F): 96.8, Max: 98.2 (11-29-20 @ 12:19)  HR: 90  BP: 152/86  RR: 22Vital Signs Last 24 Hrs  T(C): 36 (30 Nov 2020 04:55), Max: 36.8 (29 Nov 2020 12:19)  T(F): 96.8 (30 Nov 2020 04:55), Max: 98.2 (29 Nov 2020 12:19)  HR: 90 (30 Nov 2020 04:55) (86 - 102)  BP: 152/86 (30 Nov 2020 04:55) (121/85 - 152/86)  BP(mean): 100 (29 Nov 2020 12:19) (100 - 101)  RR: 22 (30 Nov 2020 04:55) (20 - 22)  SpO2: 92% (30 Nov 2020 04:55) (92% - 98%)    FH: Non-contributory  Social Hx: Non-contributory    TESTS & MEASUREMENTS:                        12.8   9.63  )-----------( 194      ( 29 Nov 2020 09:43 )             40.6     11-29    135  |  100  |  33<H>  ----------------------------<  263<H>  4.7   |  28  |  0.7    Ca    8.2<L>      29 Nov 2020 09:43  Mg     2.0     11-29    TPro  5.5<L>  /  Alb  3.2<L>  /  TBili  0.8  /  DBili  x   /  AST  20  /  ALT  16  /  AlkPhos  64  11-29    eGFR if Non African American: 103 mL/min/1.73M2 (11-29-20 @ 09:43)  eGFR if African American: 120 mL/min/1.73M2 (11-29-20 @ 09:43)    LIVER FUNCTIONS - ( 29 Nov 2020 09:43 )  Alb: 3.2 g/dL / Pro: 5.5 g/dL / ALK PHOS: 64 U/L / ALT: 16 U/L / AST: 20 U/L / GGT: x                     INFECTIOUS DISEASES TESTING  Procalcitonin, Serum: 0.08 (11-27-20 @ 08:23)  Procalcitonin, Serum: 0.08 (11-26-20 @ 11:18)  Rapid RVP Result: Detected (11-25-20 @ 12:37)  RSV Result: Negative (02-01-20 @ 11:01)  Flu A Result: Negative (02-01-20 @ 11:01)  Flu B Result: Negative (02-01-20 @ 11:01)      INFLAMMATORY MARKERS  C-Reactive Protein, Serum: 3.55 mg/dL (11-28-20 @ 04:30)  C-Reactive Protein, Serum: 11.63 mg/dL (11-26-20 @ 11:18)      RADIOLOGY & ADDITIONAL TESTS:  I have personally reviewed the last available Chest xray  CXR      CT      CARDIOLOGY TESTING  12 Lead ECG:   Ventricular Rate 140 BPM    Atrial Rate 140 BPM    P-R Interval 118 ms    QRS Duration 128 ms    Q-T Interval 298 ms    QTC Calculation(Bazett) 454 ms    P Axis 98 degrees    R Axis -23 degrees    T Axis 21 degrees    Diagnosis Line Sinus tachycardia  Right bundle branch block  Minimal voltage criteria for LVH, may be normal variant  Abnormal ECG    Confirmed by ABIMBOLA BANDA MD ([id number]) on 11/26/2020 7:46:05 AM (11-25-20 @ 18:09)  12 Lead ECG:   Ventricular Rate 112 BPM    Atrial Rate 112 BPM    P-R Interval 116 ms    QRS Duration 112 ms    Q-T Interval 354 ms    QTC Calculation(Bazett) 483 ms    P Axis 110 degrees    R Axis -17 degrees    T Axis 19 degrees    Diagnosis Line Sinus tachycardia  Incomplete right bundle branch block  Minimal voltage criteria for LVH, may be normal variant  Borderline ECG    Confirmed by ABIMBOLA BANDA MD ([id number]) on 11/26/2020 7:45:59 AM (11-25-20 @ 12:43)      MEDICATIONS  ALBUTerol    0.083% 2.5 Nebulizer every 6 hours  ALBUTerol    90 MICROgram(s) HFA Inhaler 1 Inhalation every 4 hours  albuterol/ipratropium for Nebulization 3 Nebulizer every 6 hours  amLODIPine   Tablet 10 Oral daily  atorvastatin 40 Oral at bedtime  budesonide 160 MICROgram(s)/formoterol 4.5 MICROgram(s) Inhaler 2 Inhalation two times a day  chlorhexidine 4% Liquid 1 Topical <User Schedule>  dexAMETHasone  Injectable 6 IV Push daily  enoxaparin Injectable 100 SubCutaneous two times a day  levothyroxine 175 Oral daily  montelukast 10 Oral daily  pantoprazole    Tablet 40 Oral before breakfast      WEIGHT  Weight (kg): 124.7 (11-25-20 @ 12:28)  Creatinine, Serum: 0.7 mg/dL (11-29-20 @ 09:43)      ANTIBIOTICS:      All available historical records have been reviewed

## 2020-11-30 NOTE — PROGRESS NOTE ADULT - ASSESSMENT
61 y/o Male with PMHx of HTN, COPD (former smoker) and thyroid cancer s/p thyroidectomy presenting with fever and sob for one week.    #Acute hypoxemic respiratory failure likely due to SARS-CoV2 pneumonia #cytokine storm  - no sepsis on admission, currently afebrile, leukocytosis resolved  - On High-Flow NC 40/40  - COVID-19 PCR positive  - Xray Chest (11/28): Bilateral opacities, unchanged.  - ID consulted, no benefit for RDV  D-dimer 4493 > 3180  Ferritin 1855 > 886   > 452  CRP 11.63  Procalc 0.08 > 0.08    - S/P toci x 1 dose 11/27  - ID consulted, no benefit from RDV or plasma since past replicative phase  - Continue decadron 6 mg IV daily x 10 days or until d/c, whichever is longer as per ID  - Continue symbicort   - Duplex: no DVT's  - Therapeutic Lovenox until D-Dimer <1000    #HTN - c/w amlodipine 10 mg PO daily    #Thyroid cancer s/p thyroidectomy - in remission, continue to monitor    DVT: Lovenox 100 mg PO BID  GI ppx: Protonix  Diet: DASH  Activity: AAT  FULL CODE    Daily contact [phone]. Spoke with daughter and provided updates.

## 2020-11-30 NOTE — PROGRESS NOTE ADULT - ASSESSMENT
IMPRESSION:    Acute hypoxemic resp failure  COvid PNA   COPD exacerbation  no evidence of bacterial superinfection      PLAN:    CNS: no sedation     HEENT: oral care     PULMONARY: taper oxygen  to keep pox 92 to 96%, Wean FiO2 as tolerated.  Albuterol PRN     CARDIOVASCULAR: Avoid volume overload     GI: GI prophylaxis.  Feeding     RENAL: follow is and os     INFECTIOUS DISEASE: ID f/up, trend inf markers.  FU with ID      HEMATOLOGICAL:  DVT prophylaxis.  Lovenox therapeutic,     ENDOCRINE:  Follow up FS.  Insulin protocol if needed.    MUSCULOSKELETAL: bed rest  poor prognosis

## 2020-11-30 NOTE — PROGRESS NOTE ADULT - SUBJECTIVE AND OBJECTIVE BOX
SUBJECTIVE:  HPI:   Patient is a 60y old  Male who presents with a chief complaint of sob / fever - 1 week     T(F): 97.9 (11-25-20 @ 14:30), Max: 98.9 (11-25-20 @ 12:28)  HR: 114 (11-25-20 @ 14:30)  BP: 124/77 (11-25-20 @ 14:30)  RR: 22 (11-25-20 @ 14:30)  SpO2: 84% (11-25-20 @ 14:30) (78% - 84%)    PHYSICAL EXAM:  GENERAL: NAD, well-groomed, well-developed  HEAD:  Atraumatic, Normocephalic  EYES: EOMI, PERRLA, conjunctiva and sclera clear  ENMT: No tonsillar erythema, exudates, or enlargement; Moist mucous membranes, Good dentition, No lesions  NECK: Supple, No JVD, Normal thyroid  NERVOUS SYSTEM:  Alert & Oriented X3, Good concentration; Motor Strength 5/5 B/L upper and lower extremities; DTRs 2+ intact and symmetric  CHEST/LUNG: Clear to percussion bilaterally; No rales, rhonchi, wheezing, or rubs  HEART: Regular rate and rhythm; No murmurs, rubs, or gallops  ABDOMEN: Soft, Nontender, Nondistended; Bowel sounds present  EXTREMITIES:  2+ Peripheral Pulses, No clubbing, cyanosis, or edema  LYMPH: No lymphadenopathy noted  SKIN: No rashes or lesions    labs  11-25    137  |  98  |  27<H>  ----------------------------<  143<H>  5.0   |  28  |  0.9    Ca    8.9      25 Nov 2020 13:05    TPro  6.4  /  Alb  3.3<L>  /  TBili  0.6  /  DBili  x   /  AST  23  /  ALT  15  /  AlkPhos  78  11-25                          13.3   14.50 )-----------( 239      ( 25 Nov 2020 13:05 )             40.8               radiology     (25 Nov 2020 16:43)      PAST MEDICAL & SURGICAL HISTORY  PAST MEDICAL & SURGICAL HISTORY:  Borderline diabetes    COPD (chronic obstructive pulmonary disease)    HTN (hypertension)    Thyroid cancer    H/O thyroidectomy      SOCIAL HISTORY:    ALLERGIES:  Allergy Status Unknown    MEDICATIONS:  STANDING MEDICATIONS  ALBUTerol    0.083% 2.5 milliGRAM(s) Nebulizer every 6 hours  ALBUTerol    90 MICROgram(s) HFA Inhaler 1 Puff(s) Inhalation every 4 hours  albuterol/ipratropium for Nebulization 3 milliLiter(s) Nebulizer every 6 hours  amLODIPine   Tablet 10 milliGRAM(s) Oral daily  atorvastatin 40 milliGRAM(s) Oral at bedtime  budesonide 160 MICROgram(s)/formoterol 4.5 MICROgram(s) Inhaler 2 Puff(s) Inhalation two times a day  chlorhexidine 4% Liquid 1 Application(s) Topical <User Schedule>  dexAMETHasone  Injectable 6 milliGRAM(s) IV Push daily  enoxaparin Injectable 100 milliGRAM(s) SubCutaneous two times a day  levothyroxine 175 MICROGram(s) Oral daily  montelukast 10 milliGRAM(s) Oral daily  pantoprazole    Tablet 40 milliGRAM(s) Oral before breakfast    PRN MEDICATIONS  ALBUTerol    90 MICROgram(s) HFA Inhaler 2 Puff(s) Inhalation every 6 hours PRN    VITALS:   T(F): 97.5  HR: 94  BP: 142/82  RR: 20  SpO2: 94%    LABS:                        12.8   9.63  )-----------( 194      ( 29 Nov 2020 09:43 )             40.6     11-29    135  |  100  |  33<H>  ----------------------------<  263<H>  4.7   |  28  |  0.7    Ca    8.2<L>      29 Nov 2020 09:43  Mg     2.0     11-29    TPro  5.5<L>  /  Alb  3.2<L>  /  TBili  0.8  /  DBili  x   /  AST  20  /  ALT  16  /  AlkPhos  64  11-29                  RADIOLOGY:    PHYSICAL EXAM:  GEN: No acute distress  HEENT: AT/NC PEERLA, EOMI  LUNGS: crackles b/l  HEART: S1/S2 present. RRR. no rubs, murmurs or gallops  ABD: Soft, non-tender, non-distended. Bowel sounds present in all 4 quadrants  EXT: No edema, no rashes, no cyanosis SUBJECTIVE:  HPI:   Patient is a 60y old  Male who presents with a chief complaint of sob / fever - 1 week     T(F): 97.9 (11-25-20 @ 14:30), Max: 98.9 (11-25-20 @ 12:28)  HR: 114 (11-25-20 @ 14:30)  BP: 124/77 (11-25-20 @ 14:30)  RR: 22 (11-25-20 @ 14:30)  SpO2: 84% (11-25-20 @ 14:30) (78% - 84%)    PHYSICAL EXAM:  GENERAL: NAD, well-groomed, well-developed  HEAD:  Atraumatic, Normocephalic  EYES: EOMI, PERRLA, conjunctiva and sclera clear  ENMT: No tonsillar erythema, exudates, or enlargement; Moist mucous membranes, Good dentition, No lesions  NECK: Supple, No JVD, Normal thyroid  NERVOUS SYSTEM:  Alert & Oriented X3, Good concentration; Motor Strength 5/5 B/L upper and lower extremities; DTRs 2+ intact and symmetric  CHEST/LUNG: Clear to percussion bilaterally; No rales, rhonchi, wheezing, or rubs  HEART: Regular rate and rhythm; No murmurs, rubs, or gallops  ABDOMEN: Soft, Nontender, Nondistended; Bowel sounds present  EXTREMITIES:  2+ Peripheral Pulses, No clubbing, cyanosis, or edema  LYMPH: No lymphadenopathy noted  SKIN: No rashes or lesions    labs  11-25    137  |  98  |  27<H>  ----------------------------<  143<H>  5.0   |  28  |  0.9    Ca    8.9      25 Nov 2020 13:05    TPro  6.4  /  Alb  3.3<L>  /  TBili  0.6  /  DBili  x   /  AST  23  /  ALT  15  /  AlkPhos  78  11-25                          13.3   14.50 )-----------( 239      ( 25 Nov 2020 13:05 )             40.8               radiology     (25 Nov 2020 16:43)      PAST MEDICAL & SURGICAL HISTORY  PAST MEDICAL & SURGICAL HISTORY:  Borderline diabetes    COPD (chronic obstructive pulmonary disease)    HTN (hypertension)    Thyroid cancer    H/O thyroidectomy      SOCIAL HISTORY:    ALLERGIES:  Allergy Status Unknown    MEDICATIONS:  STANDING MEDICATIONS  ALBUTerol    0.083% 2.5 milliGRAM(s) Nebulizer every 6 hours  ALBUTerol    90 MICROgram(s) HFA Inhaler 1 Puff(s) Inhalation every 4 hours  albuterol/ipratropium for Nebulization 3 milliLiter(s) Nebulizer every 6 hours  amLODIPine   Tablet 10 milliGRAM(s) Oral daily  atorvastatin 40 milliGRAM(s) Oral at bedtime  budesonide 160 MICROgram(s)/formoterol 4.5 MICROgram(s) Inhaler 2 Puff(s) Inhalation two times a day  chlorhexidine 4% Liquid 1 Application(s) Topical <User Schedule>  dexAMETHasone  Injectable 6 milliGRAM(s) IV Push daily  enoxaparin Injectable 100 milliGRAM(s) SubCutaneous two times a day  levothyroxine 175 MICROGram(s) Oral daily  montelukast 10 milliGRAM(s) Oral daily  pantoprazole    Tablet 40 milliGRAM(s) Oral before breakfast    PRN MEDICATIONS  ALBUTerol    90 MICROgram(s) HFA Inhaler 2 Puff(s) Inhalation every 6 hours PRN    VITALS:   T(F): 97.5  HR: 94  BP: 142/82  RR: 20  SpO2: 94%    LABS:                        12.8   9.63  )-----------( 194      ( 29 Nov 2020 09:43 )             40.6     11-29    135  |  100  |  33<H>  ----------------------------<  263<H>  4.7   |  28  |  0.7    Ca    8.2<L>      29 Nov 2020 09:43  Mg     2.0     11-29    TPro  5.5<L>  /  Alb  3.2<L>  /  TBili  0.8  /  DBili  x   /  AST  20  /  ALT  16  /  AlkPhos  64  11-29

## 2020-12-01 PROCEDURE — 99232 SBSQ HOSP IP/OBS MODERATE 35: CPT

## 2020-12-01 RX ADMIN — ATORVASTATIN CALCIUM 40 MILLIGRAM(S): 80 TABLET, FILM COATED ORAL at 21:24

## 2020-12-01 RX ADMIN — MONTELUKAST 10 MILLIGRAM(S): 4 TABLET, CHEWABLE ORAL at 12:55

## 2020-12-01 RX ADMIN — ENOXAPARIN SODIUM 100 MILLIGRAM(S): 100 INJECTION SUBCUTANEOUS at 17:09

## 2020-12-01 RX ADMIN — BUDESONIDE AND FORMOTEROL FUMARATE DIHYDRATE 2 PUFF(S): 160; 4.5 AEROSOL RESPIRATORY (INHALATION) at 09:32

## 2020-12-01 RX ADMIN — CHLORHEXIDINE GLUCONATE 1 APPLICATION(S): 213 SOLUTION TOPICAL at 05:35

## 2020-12-01 RX ADMIN — ENOXAPARIN SODIUM 100 MILLIGRAM(S): 100 INJECTION SUBCUTANEOUS at 05:33

## 2020-12-01 RX ADMIN — Medication 175 MICROGRAM(S): at 05:35

## 2020-12-01 RX ADMIN — Medication 6 MILLIGRAM(S): at 05:35

## 2020-12-01 RX ADMIN — PANTOPRAZOLE SODIUM 40 MILLIGRAM(S): 20 TABLET, DELAYED RELEASE ORAL at 05:34

## 2020-12-01 RX ADMIN — BUDESONIDE AND FORMOTEROL FUMARATE DIHYDRATE 2 PUFF(S): 160; 4.5 AEROSOL RESPIRATORY (INHALATION) at 20:00

## 2020-12-01 RX ADMIN — AMLODIPINE BESYLATE 10 MILLIGRAM(S): 2.5 TABLET ORAL at 05:34

## 2020-12-01 RX ADMIN — ALBUTEROL 2.5 MILLIGRAM(S): 90 AEROSOL, METERED ORAL at 02:05

## 2020-12-01 NOTE — DIETITIAN INITIAL EVALUATION ADULT. - ADD RECOMMEND
Pt to continue to consume 100% meals throughout LOS. Reassess in 7 days. RD will monitor diet order energy intake nutrition related labs body composition NFPF

## 2020-12-01 NOTE — DIETITIAN INITIAL EVALUATION ADULT. - PROBLEM SELECTOR PLAN 3
AC/ steroids/ covid swab/ contcat / droplet isolation/ ABG/ CXR/ I d f/u/   spoke with pulmonary fellow  and signed out / pt transfer to [facility]

## 2020-12-01 NOTE — CHART NOTE - NSCHARTNOTEFT_GEN_A_CORE
Transfer Note    Transfer from: CEU  Transfer to:  (x) Medicine    (  ) Telemetry    (  ) RCU    (  ) Palliative    (  ) Stroke Unit    (  ) ______________      CEU COURSE:    Admitted for COVID, Continued on RDV. Started on Decadron 6 mg, decreased to 4 mg qD. Given 1 dose of Toci 11/27. Desaturated on venti mask, switched to [facility]  .     ASSESSMENT & PLAN:       For Follow-Up:          Vital Signs Last 24 Hrs  T(C): 36.3 (01 Dec 2020 21:30), Max: 36.6 (01 Dec 2020 08:36)  T(F): 97.4 (01 Dec 2020 21:30), Max: 97.8 (01 Dec 2020 08:36)  HR: 76 (01 Dec 2020 21:30) (76 - 91)  BP: 129/79 (01 Dec 2020 21:30) (119/76 - 138/78)  BP(mean): --  RR: 18 (01 Dec 2020 21:30) (18 - 20)  SpO2: 97% (01 Dec 2020 21:30) (95% - 98%)  I&O's Summary    30 Nov 2020 07:01  -  01 Dec 2020 07:00  --------------------------------------------------------  IN: 600 mL / OUT: 1200 mL / NET: -600 mL    01 Dec 2020 07:01  -  01 Dec 2020 23:18  --------------------------------------------------------  IN: 0 mL / OUT: 650 mL / NET: -650 mL          MEDICATIONS  (STANDING):  ALBUTerol    0.083% 2.5 milliGRAM(s) Nebulizer every 6 hours  ALBUTerol    90 MICROgram(s) HFA Inhaler 1 Puff(s) Inhalation every 4 hours  albuterol/ipratropium for Nebulization 3 milliLiter(s) Nebulizer every 6 hours  amLODIPine   Tablet 10 milliGRAM(s) Oral daily  atorvastatin 40 milliGRAM(s) Oral at bedtime  budesonide 160 MICROgram(s)/formoterol 4.5 MICROgram(s) Inhaler 2 Puff(s) Inhalation two times a day  chlorhexidine 4% Liquid 1 Application(s) Topical <User Schedule>  dexAMETHasone  Injectable 6 milliGRAM(s) IV Push daily  enoxaparin Injectable 100 milliGRAM(s) SubCutaneous two times a day  levothyroxine 175 MICROGram(s) Oral daily  montelukast 10 milliGRAM(s) Oral daily  pantoprazole    Tablet 40 milliGRAM(s) Oral before breakfast    MEDICATIONS  (PRN):  ALBUTerol    90 MICROgram(s) HFA Inhaler 2 Puff(s) Inhalation every 6 hours PRN Shortness of Breath and/or Wheezing        LABS

## 2020-12-01 NOTE — DIETITIAN INITIAL EVALUATION ADULT. - PERTINENT LABORATORY DATA
(11/29/2020) RBC 4.55 H/H 12.8/40.6 BUN 33 glucose 263 corrected Ca 8.84  --elevated serum glucose likely 2/2 steroid medication or inflammatory state. h/o PreDM

## 2020-12-01 NOTE — DIETITIAN INITIAL EVALUATION ADULT. - PERSON TAUGHT/METHOD
RD discussed DASH/TLC diet w. pt and reasoning diet order is in place but pt does not want to be on current diet. Pt requesting Regular diet despite diet education. d/w MD (DR. Acuña) no further d/c ed./patient instructed/verbal instruction

## 2020-12-01 NOTE — PROGRESS NOTE ADULT - ASSESSMENT
·	Acute Hypoxic Respiratory Failure secondary to COVID19 pneumonia   ·	Severe COVID19 infection with evidence of underlying inflammatory cytokine storm   ·	COPD and reactive airways, stable currently on montelukast, inhaled steroids and LABA at baseline  ·	No evidence of DVT onc LE duplex; D-Dimers decreasing  ·	Hypothyroidism on therapy   ·	Hyperlipidemia on therapy   ·	Obesity    PLAN  ·	Titrate down O2 to keep SpO2 at rest around 92-96%  ·	Ambulate as tolerated   ·	Decrease Low-Molecular Weight Heparin (Lovenox) to 40 mg Q12 hours while inpatient and DC on oral ASA or low dose novel oral anti-coagulant (NOAC) for additional 30 days. For extended DVT prophylaxis.   ·	Will likely need home O2 for a while.   ·	outpatient Follow up with his primary care physician or pulm within two weeks of DC     Medical team updating patient's spouse routinely; all Qs answered

## 2020-12-01 NOTE — DIETITIAN INITIAL EVALUATION ADULT. - ENERGY INTAKE
Pt reports appetite and intake stable to baseline. Consumes 100% meals since admit and tolerating trexture well. RD provided diet ed however pt requesting diet liberalized to REgular. RD explained reasoning for current diet given h/o HTN & COPD but pt continues to request Regular diet. Unable to complete diet refusal form d/t COVID restrictions will d/w MD (DR. Acuña). Good (>75%)

## 2020-12-01 NOTE — DIETITIAN INITIAL EVALUATION ADULT. - ORAL INTAKE PTA/DIET HISTORY
Unable to conduct face to face assessment d/t limited clinical contact precautions a/w COVID-19. Spoke to pt via phone who reports adequate appetite & intake at baseline. Consumes 3 meals/day + snacks occasionally. NKFA. Denies use of nutrition supplements. No cultural/[place]iness food preferences noted.

## 2020-12-01 NOTE — DIETITIAN INITIAL EVALUATION ADULT. - OTHER INFO
Pt p/w SOB & fever x1 week PTA w. primary dx: COVID-19. Hospital course complicated by acute hypoxic respiratory failure 2/2 COVID-19 PNA--HFNC 50% titrate down O2. Severe COVID19 infection with evidence of underlying inflammatory cytokine storm. h/o COPD--currently stable.

## 2020-12-01 NOTE — PROGRESS NOTE ADULT - ASSESSMENT
ASSESSMENT  60y M pre-DM, HTN, COPD, thyroid ca admitted with COVID-19    IMPRESSION  #Severe COVID19 PNA with Sepsis on admission (RR>20 P>90 WBC 14) requiring supplemental O2     CXR bilateral opacities     Procalcitonin, Serum: 0.08 (11-27-20 @ 08:23)    LE Duplex NEGATIVE   #Cytokine Release Syndrome   #Morbid Obesity BMI (kg/m2): 38.4  #Pre-DM Hemoglobin A1C, Whole Blood: 6.1 (02-02-20 @ 06:00)    RECOMMENDATIONS  - s/p 11/27 Tocilizumab 400mg x1  - Dexamethasone 6mg daily x 10 days or until discharge, whichever is longer  - A/C per primary team   - Prone positioning if possible     If any questions, please call or send a message on [employer] Teams  Spectra [phone]

## 2020-12-01 NOTE — PROGRESS NOTE ADULT - SUBJECTIVE AND OBJECTIVE BOX
T H I S   I S    N O  T   A    F I N A L I Z E D   N O T E      MARY, JOAQUIN  60y, Male  Allergy: Allergy Status Unknown    Hospital Day: 6d    Patient seen and examined earlier today.     PMH/PSH:  PAST MEDICAL & SURGICAL HISTORY:  Borderline diabetes    COPD (chronic obstructive pulmonary disease)    HTN (hypertension)    Thyroid cancer    H/O thyroidectomy        LAST 24-Hr EVENTS:    VITALS:  T(F): 97.8 (12-01-20 @ 08:36), Max: 98.5 (11-30-20 @ 16:00)  HR: 86 (12-01-20 @ 08:36)  BP: 125/86 (12-01-20 @ 08:36) (121/78 - 131/93)  RR: 20 (12-01-20 @ 08:36)  SpO2: 98% (12-01-20 @ 08:45)        TESTS & MEASUREMENTS:    11-29-20 @ 07:01  -  11-30-20 @ 07:00  --------------------------------------------------------  IN: 480 mL / OUT: 900 mL / NET: -420 mL    11-30-20 @ 07:01  -  12-01-20 @ 07:00  --------------------------------------------------------  IN: 600 mL / OUT: 1200 mL / NET: -600 mL      Procalcitonin, Serum: 0.08 ng/mL (11-27-20 @ 08:23)  Procalcitonin, Serum: 0.08 ng/mL (11-26-20 @ 11:18)    D-Dimer Assay, Quantitative: 3180 ng/mL DDU (11-28-20 @ 04:30)  D-Dimer Assay, Quantitative: 4493 ng/mL DDU (11-26-20 @ 11:18)    Ferritin, Serum: 886 ng/mL (11-28-20 @ 07:00)  Ferritin, Serum: 1855 ng/mL (11-26-20 @ 11:18)    Serum Pro-Brain Natriuretic Peptide: 566 pg/mL (11-25-20 @ 13:05)        RADIOLOGY, ECG, & ADDITIONAL TESTS:      RECENT DIAGNOSTIC ORDERS:  Magnesium, Serum: AM Sched. Collection: 02-Dec-2020 04:30 (12-01-20 @ 07:58)  Comprehensive Metabolic Panel: AM Sched. Collection: 02-Dec-2020 04:30 (12-01-20 @ 07:58)  Complete Blood Count + Automated Diff: AM Sched. Collection: 02-Dec-2020 04:30 (12-01-20 @ 07:58)      MEDICATIONS:  MEDICATIONS  (STANDING):  ALBUTerol    0.083% 2.5 milliGRAM(s) Nebulizer every 6 hours  ALBUTerol    90 MICROgram(s) HFA Inhaler 1 Puff(s) Inhalation every 4 hours  albuterol/ipratropium for Nebulization 3 milliLiter(s) Nebulizer every 6 hours  amLODIPine   Tablet 10 milliGRAM(s) Oral daily  atorvastatin 40 milliGRAM(s) Oral at bedtime  budesonide 160 MICROgram(s)/formoterol 4.5 MICROgram(s) Inhaler 2 Puff(s) Inhalation two times a day  chlorhexidine 4% Liquid 1 Application(s) Topical <User Schedule>  dexAMETHasone  Injectable 6 milliGRAM(s) IV Push daily  enoxaparin Injectable 100 milliGRAM(s) SubCutaneous two times a day  levothyroxine 175 MICROGram(s) Oral daily  montelukast 10 milliGRAM(s) Oral daily  pantoprazole    Tablet 40 milliGRAM(s) Oral before breakfast    MEDICATIONS  (PRN):  ALBUTerol    90 MICROgram(s) HFA Inhaler 2 Puff(s) Inhalation every 6 hours PRN Shortness of Breath and/or Wheezing      HOME MEDICATIONS:  Advair Diskus (02-07)  levothyroxine 175 mcg (0.175 mg) oral tablet (11-23)  omeprazole 40 mg oral delayed release capsule (11-23)  pravastatin 40 mg oral tablet (11-23)  Singulair 10 mg oral tablet (11-23)  Spiriva 18 mcg inhalation capsule (11-23)  Toprol-XL 25 mg oral tablet, extended release (11-23)  Ventolin HFA 90 mcg/inh inhalation aerosol (11-23)      PHYSICAL EXAM:  GENERAL:   NECK:   CHEST/LUNG:   HEART:   ABDOMEN:   EXTREMITIES:             MARYARLYNY  60y, Male  Allergy: Allergy Status Unknown    Hospital Day: 6d    Patient seen and examined earlier today in CEU    LAST 24-Hr EVENTS:  No overnight events    VITALS:  T(F): 97.8 (12-01-20 @ 08:36), Max: 98.5 (11-30-20 @ 16:00)  HR: 86 (12-01-20 @ 08:36)  BP: 125/86 (12-01-20 @ 08:36) (121/78 - 131/93)  RR: 20 (12-01-20 @ 08:36)  SpO2: 98% (12-01-20 @ 08:45) on FiO2 40% via Humidified High Flow Nasal Cannula (HHFNC)         TESTS & MEASUREMENTS:    11-29-20 @ 07:01  -  11-30-20 @ 07:00  --------------------------------------------------------  IN: 480 mL / OUT: 900 mL / NET: -420 mL    11-30-20 @ 07:01  -  12-01-20 @ 07:00  --------------------------------------------------------  IN: 600 mL / OUT: 1200 mL / NET: -600 mL      Procalcitonin, Serum: 0.08 ng/mL (11-27-20 @ 08:23)  Procalcitonin, Serum: 0.08 ng/mL (11-26-20 @ 11:18)    D-Dimer Assay, Quantitative: 3180 ng/mL DDU (11-28-20 @ 04:30)  D-Dimer Assay, Quantitative: 4493 ng/mL DDU (11-26-20 @ 11:18)    Ferritin, Serum: 886 ng/mL (11-28-20 @ 07:00)  Ferritin, Serum: 1855 ng/mL (11-26-20 @ 11:18)    Serum Pro-Brain Natriuretic Peptide: 566 pg/mL (11-25-20 @ 13:05)        RADIOLOGY, ECG, & ADDITIONAL TESTS:  < from: Xray Chest 1 View- PORTABLE-Routine (Xray Chest 1 View- PORTABLE-Routine in AM.) (11.28.20 @ 06:49) >  Status post a median sternotomy and cardiac megaly, unchanged.    Bilateral opacities, unchanged.    < from: [facility] Duplex Lower Ext Vein Scan, Bilat (11.27.20 @ 17:19) >  No evidence of deep venous thrombosis or superficial thrombophlebitis in the bilateral lower extremities.        MEDICATIONS:  MEDICATIONS  (STANDING):  ALBUTerol    0.083% 2.5 milliGRAM(s) Nebulizer every 6 hours  ALBUTerol    90 MICROgram(s) HFA Inhaler 1 Puff(s) Inhalation every 4 hours  albuterol/ipratropium for Nebulization 3 milliLiter(s) Nebulizer every 6 hours  amLODIPine   Tablet 10 milliGRAM(s) Oral daily  atorvastatin 40 milliGRAM(s) Oral at bedtime  budesonide 160 MICROgram(s)/formoterol 4.5 MICROgram(s) Inhaler 2 Puff(s) Inhalation two times a day  chlorhexidine 4% Liquid 1 Application(s) Topical <User Schedule>  dexAMETHasone  Injectable 6 milliGRAM(s) IV Push daily  enoxaparin Injectable 100 milliGRAM(s) SubCutaneous two times a day  levothyroxine 175 MICROGram(s) Oral daily  montelukast 10 milliGRAM(s) Oral daily  pantoprazole    Tablet 40 milliGRAM(s) Oral before breakfast    MEDICATIONS  (PRN):  ALBUTerol    90 MICROgram(s) HFA Inhaler 2 Puff(s) Inhalation every 6 hours PRN Shortness of Breath and/or Wheezing      HOME MEDICATIONS:  Advair Diskus (02-07)  levothyroxine 175 mcg (0.175 mg) oral tablet (11-23)  omeprazole 40 mg oral delayed release capsule (11-23)  pravastatin 40 mg oral tablet (11-23)  Singulair 10 mg oral tablet (11-23)  Spiriva 18 mcg inhalation capsule (11-23)  Toprol-XL 25 mg oral tablet, extended release (11-23)  Ventolin HFA 90 mcg/inh inhalation aerosol (11-23)      PHYSICAL EXAM:  GENERAL: In NAD/P while at rest/ full sentences in a discernable clear voice/ on Humidified High Flow Nasal Cannula (HHFNC)   CHEST/LUNG: No wheezing ++faint crackles (rbett) on ant auscultation  HEART: S1S2  ABDOMEN: BS+ / obese/ soft  EXTREMITIES: No LLE/ chronic skin changes

## 2020-12-01 NOTE — PROGRESS NOTE ADULT - SUBJECTIVE AND OBJECTIVE BOX
MARY, JOAQUIN  60y, Male  Allergy: Allergy Status Unknown      LOS  6d    CHIEF COMPLAINT: COPD / covid inf (30 Nov 2020 19:46)      INTERVAL EVENTS/HPI  - HFNC  - T(F): , Max: 98.5 (11-30-20 @ 16:00)  - WBC Count: 9.63 (11-29-20 @ 09:43)  - Creatinine, Serum: 0.7 (11-29-20 @ 09:43)         VITALS:  T(F): 97.8, Max: 98.5 (11-30-20 @ 16:00)  HR: 86  BP: 125/86  RR: 20Vital Signs Last 24 Hrs  T(C): 36.6 (01 Dec 2020 08:36), Max: 36.9 (30 Nov 2020 16:00)  T(F): 97.8 (01 Dec 2020 08:36), Max: 98.5 (30 Nov 2020 16:00)  HR: 86 (01 Dec 2020 08:36) (82 - 94)  BP: 125/86 (01 Dec 2020 08:36) (121/78 - 142/82)  BP(mean): --  RR: 20 (01 Dec 2020 08:36) (20 - 24)  SpO2: 98% (01 Dec 2020 08:45) (94% - 98%)    FH: Non-contributory  Social Hx: Non-contributory    TESTS & MEASUREMENTS:                        12.8   9.63  )-----------( 194      ( 29 Nov 2020 09:43 )             40.6     11-29    135  |  100  |  33<H>  ----------------------------<  263<H>  4.7   |  28  |  0.7    Ca    8.2<L>      29 Nov 2020 09:43  Mg     2.0     11-29    TPro  5.5<L>  /  Alb  3.2<L>  /  TBili  0.8  /  DBili  x   /  AST  20  /  ALT  16  /  AlkPhos  64  11-29      LIVER FUNCTIONS - ( 29 Nov 2020 09:43 )  Alb: 3.2 g/dL / Pro: 5.5 g/dL / ALK PHOS: 64 U/L / ALT: 16 U/L / AST: 20 U/L / GGT: x                     INFECTIOUS DISEASES TESTING  Procalcitonin, Serum: 0.08 (11-27-20 @ 08:23)  Procalcitonin, Serum: 0.08 (11-26-20 @ 11:18)  Rapid RVP Result: Detected (11-25-20 @ 12:37)  RSV Result: Negative (02-01-20 @ 11:01)  Flu A Result: Negative (02-01-20 @ 11:01)  Flu B Result: Negative (02-01-20 @ 11:01)      INFLAMMATORY MARKERS  C-Reactive Protein, Serum: 3.55 mg/dL (11-28-20 @ 04:30)  C-Reactive Protein, Serum: 11.63 mg/dL (11-26-20 @ 11:18)      RADIOLOGY & ADDITIONAL TESTS:  I have personally reviewed the last available Chest xray  CXR      CT      CARDIOLOGY TESTING  12 Lead ECG:   Ventricular Rate 140 BPM    Atrial Rate 140 BPM    P-R Interval 118 ms    QRS Duration 128 ms    Q-T Interval 298 ms    QTC Calculation(Bazett) 454 ms    P Axis 98 degrees    R Axis -23 degrees    T Axis 21 degrees    Diagnosis Line Sinus tachycardia  Right bundle branch block  Minimal voltage criteria for LVH, may be normal variant  Abnormal ECG    Confirmed by ABIMBOLA BANDA MD ([id number]) on 11/26/2020 7:46:05 AM (11-25-20 @ 18:09)  12 Lead ECG:   Ventricular Rate 112 BPM    Atrial Rate 112 BPM    P-R Interval 116 ms    QRS Duration 112 ms    Q-T Interval 354 ms    QTC Calculation(Bazett) 483 ms    P Axis 110 degrees    R Axis -17 degrees    T Axis 19 degrees    Diagnosis Line Sinus tachycardia  Incomplete right bundle branch block  Minimal voltage criteria for LVH, may be normal variant  Borderline ECG    Confirmed by ABIMBOLA BANDA MD ([id number]) on 11/26/2020 7:45:59 AM (11-25-20 @ 12:43)      MEDICATIONS  ALBUTerol    0.083% 2.5 Nebulizer every 6 hours  ALBUTerol    90 MICROgram(s) HFA Inhaler 1 Inhalation every 4 hours  albuterol/ipratropium for Nebulization 3 Nebulizer every 6 hours  amLODIPine   Tablet 10 Oral daily  atorvastatin 40 Oral at bedtime  budesonide 160 MICROgram(s)/formoterol 4.5 MICROgram(s) Inhaler 2 Inhalation two times a day  chlorhexidine 4% Liquid 1 Topical <User Schedule>  dexAMETHasone  Injectable 6 IV Push daily  enoxaparin Injectable 100 SubCutaneous two times a day  levothyroxine 175 Oral daily  montelukast 10 Oral daily  pantoprazole    Tablet 40 Oral before breakfast      WEIGHT  Weight (kg): 124.7 (11-25-20 @ 12:28)      ANTIBIOTICS:      All available historical records have been reviewed

## 2020-12-02 LAB
ALBUMIN SERPL ELPH-MCNC: 3.3 G/DL — LOW (ref 3.5–5.2)
ALP SERPL-CCNC: 60 U/L — SIGNIFICANT CHANGE UP (ref 30–115)
ALT FLD-CCNC: 71 U/L — HIGH (ref 0–41)
ANION GAP SERPL CALC-SCNC: 9 MMOL/L — SIGNIFICANT CHANGE UP (ref 7–14)
AST SERPL-CCNC: 32 U/L — SIGNIFICANT CHANGE UP (ref 0–41)
BASOPHILS # BLD AUTO: 0.03 K/UL — SIGNIFICANT CHANGE UP (ref 0–0.2)
BASOPHILS NFR BLD AUTO: 0.2 % — SIGNIFICANT CHANGE UP (ref 0–1)
BILIRUB SERPL-MCNC: 0.6 MG/DL — SIGNIFICANT CHANGE UP (ref 0.2–1.2)
BUN SERPL-MCNC: 30 MG/DL — HIGH (ref 10–20)
CALCIUM SERPL-MCNC: 8.5 MG/DL — SIGNIFICANT CHANGE UP (ref 8.5–10.1)
CHLORIDE SERPL-SCNC: 102 MMOL/L — SIGNIFICANT CHANGE UP (ref 98–110)
CO2 SERPL-SCNC: 27 MMOL/L — SIGNIFICANT CHANGE UP (ref 17–32)
CREAT SERPL-MCNC: 0.7 MG/DL — SIGNIFICANT CHANGE UP (ref 0.7–1.5)
EOSINOPHIL # BLD AUTO: 0.41 K/UL — SIGNIFICANT CHANGE UP (ref 0–0.7)
EOSINOPHIL NFR BLD AUTO: 3.3 % — SIGNIFICANT CHANGE UP (ref 0–8)
GLUCOSE SERPL-MCNC: 129 MG/DL — HIGH (ref 70–99)
HCT VFR BLD CALC: 40.1 % — LOW (ref 42–52)
HGB BLD-MCNC: 13.1 G/DL — LOW (ref 14–18)
IMM GRANULOCYTES NFR BLD AUTO: 3 % — HIGH (ref 0.1–0.3)
LYMPHOCYTES # BLD AUTO: 1.45 K/UL — SIGNIFICANT CHANGE UP (ref 1.2–3.4)
LYMPHOCYTES # BLD AUTO: 11.8 % — LOW (ref 20.5–51.1)
MAGNESIUM SERPL-MCNC: 1.8 MG/DL — SIGNIFICANT CHANGE UP (ref 1.8–2.4)
MCHC RBC-ENTMCNC: 28.5 PG — SIGNIFICANT CHANGE UP (ref 27–31)
MCHC RBC-ENTMCNC: 32.7 G/DL — SIGNIFICANT CHANGE UP (ref 32–37)
MCV RBC AUTO: 87.4 FL — SIGNIFICANT CHANGE UP (ref 80–94)
MONOCYTES # BLD AUTO: 0.93 K/UL — HIGH (ref 0.1–0.6)
MONOCYTES NFR BLD AUTO: 7.6 % — SIGNIFICANT CHANGE UP (ref 1.7–9.3)
NEUTROPHILS # BLD AUTO: 9.07 K/UL — HIGH (ref 1.4–6.5)
NEUTROPHILS NFR BLD AUTO: 74.1 % — SIGNIFICANT CHANGE UP (ref 42.2–75.2)
NRBC # BLD: 0 /100 WBCS — SIGNIFICANT CHANGE UP (ref 0–0)
PLATELET # BLD AUTO: 238 K/UL — SIGNIFICANT CHANGE UP (ref 130–400)
POTASSIUM SERPL-MCNC: 4.1 MMOL/L — SIGNIFICANT CHANGE UP (ref 3.5–5)
POTASSIUM SERPL-SCNC: 4.1 MMOL/L — SIGNIFICANT CHANGE UP (ref 3.5–5)
PROT SERPL-MCNC: 5.5 G/DL — LOW (ref 6–8)
RBC # BLD: 4.59 M/UL — LOW (ref 4.7–6.1)
RBC # FLD: 13.5 % — SIGNIFICANT CHANGE UP (ref 11.5–14.5)
SODIUM SERPL-SCNC: 138 MMOL/L — SIGNIFICANT CHANGE UP (ref 135–146)
WBC # BLD: 12.26 K/UL — HIGH (ref 4.8–10.8)
WBC # FLD AUTO: 12.26 K/UL — HIGH (ref 4.8–10.8)

## 2020-12-02 PROCEDURE — 71275 CT ANGIOGRAPHY CHEST: CPT | Mod: 26

## 2020-12-02 PROCEDURE — 99233 SBSQ HOSP IP/OBS HIGH 50: CPT

## 2020-12-02 RX ORDER — ENOXAPARIN SODIUM 100 MG/ML
100 INJECTION SUBCUTANEOUS
Refills: 0 | Status: DISCONTINUED | OUTPATIENT
Start: 2020-12-02 | End: 2020-12-04

## 2020-12-02 RX ORDER — ENOXAPARIN SODIUM 100 MG/ML
40 INJECTION SUBCUTANEOUS EVERY 12 HOURS
Refills: 0 | Status: DISCONTINUED | OUTPATIENT
Start: 2020-12-02 | End: 2020-12-02

## 2020-12-02 RX ADMIN — Medication 3 MILLILITER(S): at 09:10

## 2020-12-02 RX ADMIN — AMLODIPINE BESYLATE 10 MILLIGRAM(S): 2.5 TABLET ORAL at 05:19

## 2020-12-02 RX ADMIN — ATORVASTATIN CALCIUM 40 MILLIGRAM(S): 80 TABLET, FILM COATED ORAL at 21:29

## 2020-12-02 RX ADMIN — ALBUTEROL 2 PUFF(S): 90 AEROSOL, METERED ORAL at 20:47

## 2020-12-02 RX ADMIN — Medication 6 MILLIGRAM(S): at 05:18

## 2020-12-02 RX ADMIN — Medication 175 MICROGRAM(S): at 05:19

## 2020-12-02 RX ADMIN — ENOXAPARIN SODIUM 100 MILLIGRAM(S): 100 INJECTION SUBCUTANEOUS at 17:32

## 2020-12-02 RX ADMIN — ENOXAPARIN SODIUM 100 MILLIGRAM(S): 100 INJECTION SUBCUTANEOUS at 05:18

## 2020-12-02 RX ADMIN — BUDESONIDE AND FORMOTEROL FUMARATE DIHYDRATE 2 PUFF(S): 160; 4.5 AEROSOL RESPIRATORY (INHALATION) at 07:38

## 2020-12-02 RX ADMIN — PANTOPRAZOLE SODIUM 40 MILLIGRAM(S): 20 TABLET, DELAYED RELEASE ORAL at 05:19

## 2020-12-02 RX ADMIN — BUDESONIDE AND FORMOTEROL FUMARATE DIHYDRATE 2 PUFF(S): 160; 4.5 AEROSOL RESPIRATORY (INHALATION) at 19:26

## 2020-12-02 RX ADMIN — MONTELUKAST 10 MILLIGRAM(S): 4 TABLET, CHEWABLE ORAL at 11:35

## 2020-12-02 NOTE — CHART NOTE - NSCHARTNOTEFT_GEN_A_CORE
Pt requested to sign diet order refusal form. Pt would like to be transitioned to regular diet order despite recommended DASH/TLC diet order. Pt requested to sign diet order refusal form. Pt would like to be transitioned to regular diet order despite recommended DASH/TLC diet order. Contacted resident ([phone]) to discuss.

## 2020-12-02 NOTE — PROGRESS NOTE ADULT - SUBJECTIVE AND OBJECTIVE BOX
MARY, JOAQUIN  60y, Male  Allergy: Allergy Status Unknown      LOS  7d    CHIEF COMPLAINT: COPD / covid inf (02 Dec 2020 10:00)      INTERVAL EVENTS/HPI  - 4L from HFNC  - T(F): , Max: 97.4 (12-01-20 @ 21:30)  - WBC Count: 12.26 (12-02-20 @ 06:48)  - Creatinine, Serum: 0.7 (12-02-20 @ 06:48)         VITALS:  T(F): 96.6, Max: 97.4 (12-01-20 @ 21:30)  HR: 68  BP: 136/88  RR: 18Vital Signs Last 24 Hrs  T(C): 35.9 (02 Dec 2020 05:00), Max: 36.3 (01 Dec 2020 11:28)  T(F): 96.6 (02 Dec 2020 05:00), Max: 97.4 (01 Dec 2020 21:30)  HR: 68 (02 Dec 2020 05:00) (68 - 97)  BP: 136/88 (02 Dec 2020 05:00) (119/76 - 138/78)  BP(mean): --  RR: 18 (02 Dec 2020 05:00) (18 - 20)  SpO2: 97% (02 Dec 2020 10:16) (95% - 98%)    FH: Non-contributory  Social Hx: Non-contributory    TESTS & MEASUREMENTS:                        13.1   12.26 )-----------( 238      ( 02 Dec 2020 06:48 )             40.1     12-02    138  |  102  |  30<H>  ----------------------------<  129<H>  4.1   |  27  |  0.7    Ca    8.5      02 Dec 2020 06:48  Mg     1.8     12-02    TPro  5.5<L>  /  Alb  3.3<L>  /  TBili  0.6  /  DBili  x   /  AST  32  /  ALT  71<H>  /  AlkPhos  60  12-02    eGFR if : 120 mL/min/1.73M2 (12-02-20 @ 06:48)  eGFR if Non African American: 103 mL/min/1.73M2 (12-02-20 @ 06:48)    LIVER FUNCTIONS - ( 02 Dec 2020 06:48 )  Alb: 3.3 g/dL / Pro: 5.5 g/dL / ALK PHOS: 60 U/L / ALT: 71 U/L / AST: 32 U/L / GGT: x                     INFECTIOUS DISEASES TESTING  Procalcitonin, Serum: 0.08 (11-27-20 @ 08:23)  Procalcitonin, Serum: 0.08 (11-26-20 @ 11:18)  Rapid RVP Result: Detected (11-25-20 @ 12:37)  RSV Result: Negative (02-01-20 @ 11:01)  Flu A Result: Negative (02-01-20 @ 11:01)  Flu B Result: Negative (02-01-20 @ 11:01)      INFLAMMATORY MARKERS  C-Reactive Protein, Serum: 3.55 mg/dL (11-28-20 @ 04:30)  C-Reactive Protein, Serum: 11.63 mg/dL (11-26-20 @ 11:18)      RADIOLOGY & ADDITIONAL TESTS:  I have personally reviewed the last available Chest xray  CXR      CT      CARDIOLOGY TESTING  12 Lead ECG:   Ventricular Rate 140 BPM    Atrial Rate 140 BPM    P-R Interval 118 ms    QRS Duration 128 ms    Q-T Interval 298 ms    QTC Calculation(Bazett) 454 ms    P Axis 98 degrees    R Axis -23 degrees    T Axis 21 degrees    Diagnosis Line Sinus tachycardia  Right bundle branch block  Minimal voltage criteria for LVH, may be normal variant  Abnormal ECG    Confirmed by ABIMBOLA BANDA MD ([id number]) on 11/26/2020 7:46:05 AM (11-25-20 @ 18:09)  12 Lead ECG:   Ventricular Rate 112 BPM    Atrial Rate 112 BPM    P-R Interval 116 ms    QRS Duration 112 ms    Q-T Interval 354 ms    QTC Calculation(Bazett) 483 ms    P Axis 110 degrees    R Axis -17 degrees    T Axis 19 degrees    Diagnosis Line Sinus tachycardia  Incomplete right bundle branch block  Minimal voltage criteria for LVH, may be normal variant  Borderline ECG    Confirmed by ABIMBOLA BANDA MD [id number]) on 11/26/2020 7:45:59 AM (11-25-20 @ 12:43)      MEDICATIONS  ALBUTerol    0.083% 2.5 Nebulizer every 6 hours  ALBUTerol    90 MICROgram(s) HFA Inhaler 1 Inhalation every 4 hours  albuterol/ipratropium for Nebulization 3 Nebulizer every 6 hours  amLODIPine   Tablet 10 Oral daily  atorvastatin 40 Oral at bedtime  budesonide 160 MICROgram(s)/formoterol 4.5 MICROgram(s) Inhaler 2 Inhalation two times a day  chlorhexidine 4% Liquid 1 Topical <User Schedule>  dexAMETHasone  Injectable 6 IV Push daily  enoxaparin Injectable 40 SubCutaneous every 12 hours  levothyroxine 175 Oral daily  montelukast 10 Oral daily  pantoprazole    Tablet 40 Oral before breakfast      WEIGHT  Weight (kg): 124.7 (11-25-20 @ 12:28)  Creatinine, Serum: 0.7 mg/dL (12-02-20 @ 06:48)      ANTIBIOTICS:      All available historical records have been reviewed

## 2020-12-02 NOTE — PROGRESS NOTE ADULT - ASSESSMENT
ASSESSMENT  60y M pre-DM, HTN, COPD, thyroid ca admitted with COVID-19    IMPRESSION  #Severe COVID19 PNA with Sepsis on admission (RR>20 P>90 WBC 14) requiring supplemental O2     CXR bilateral opacities     Procalcitonin, Serum: 0.08 (11-27-20 @ 08:23)    LE Duplex NEGATIVE   #Cytokine Release Syndrome   #Morbid Obesity BMI (kg/m2): 38.4  #Pre-DM Hemoglobin A1C, Whole Blood: 6.1 (02-02-20 @ 06:00)    RECOMMENDATIONS  - s/p 11/27 Tocilizumab 400mg x1  - Dexamethasone 6mg daily x 10 days or until discharge, whichever is longer  - A/C per primary team   - Prone positioning if possible     If any questions, please call or send a message on [employer] Teams  Spectra [phone]

## 2020-12-02 NOTE — PROGRESS NOTE ADULT - ATTENDING COMMENTS
59 y/o Male with PMHx of HTN, COPD (former smoker) and thyroid cancer s/p thyroidectomy presenting with fever and sob for one week.    #Acute hypoxemic respiratory failure due to SARS-CoV2 pneumonia   #cytokine storm  - no sepsis on admission  CTA (12/2): diffuse b/l reticular/GGOs. No PE.   Duplex: no DVT's  Markers:   -D-dimer 4493 > 3180, Ferritin 1855 > 886,  > 452, CRP 11.63, Procalc 0.08 > 0.08  -     - S/P toci x 1 dose 11/27  - decadron 6 mg IV daily D6/10  -On Therapeutic Lovenox.     -11/25 - Venturi Mask  -11/26 - High-Flow NC 40/40  -12/2 - titrated down from HFNC 40/40 to NC 3L.      - Continue symbicort     #HTN   - c/w amlodipine 10 mg PO daily    #Thyroid cancer s/p thyroidectomy   - in remission, continue to monitor    DVT: Lovenox   GI ppx: Protonix  Diet: DASH  Activity: AAT  FULL CODE    Daily contact [phone]

## 2020-12-02 NOTE — PROGRESS NOTE ADULT - SUBJECTIVE AND OBJECTIVE BOX
DAILY PROGRESS NOTE  ===========================================================    Patient Information:  JOAQUIN HERNANDEZ  /  60y  /  Male  /  MRN#: [id number]    Hospital Day: 7d     |:::::::::::::::::::::::::::| SUBJECTIVE |:::::::::::::::::::::::::::|    OVERNIGHT EVENTS: None   TODAY: Patient was seen today at bedside. Review of systems is otherwise negative. Eager to get off the HFNC     |:::::::::::::::::::::::::::| OBJECTIVE |:::::::::::::::::::::::::::|    VITAL SIGNS: Last 24 Hours  T(C): 35.9 (02 Dec 2020 05:00), Max: 36.3 (01 Dec 2020 11:28)  T(F): 96.6 (02 Dec 2020 05:00), Max: 97.4 (01 Dec 2020 21:30)  HR: 68 (02 Dec 2020 05:00) (68 - 97)  BP: 136/88 (02 Dec 2020 05:00) (119/76 - 138/78)  BP(mean): --  RR: 18 (02 Dec 2020 05:00) (18 - 20)  SpO2: 97% (02 Dec 2020 08:21) (95% - 98%)    12-01-20 @ 07:01  -  12-02-20 @ 07:00  --------------------------------------------------------  IN: 0 mL / OUT: 650 mL / NET: -650 mL      PHYSICAL EXAM:  GENERAL:   Awake, alert; NAD.  HEENT:  Head NC/AT; Conjunctivae pink, Sclera anicteric; Oral mucosa moist.  CARDIO:   Regular rate; Regular rhythm; S1 & S2.  RESP: mild crackles b/l  GI:   Soft; NT/ND; BS; No guarding; No rebound tenderness.  EXT:   Stregnth UE 5/5; Strength LE 5/5; No edema.   SKIN:   Intact.    LAB RESULTS:                        13.1   12.26 )-----------( 238      ( 02 Dec 2020 06:48 )             40.1     12-02    138  |  102  |  30<H>  ----------------------------<  129<H>  4.1   |  27  |  0.7    Ca    8.5      02 Dec 2020 06:48  Mg     1.8     12-02    TPro  5.5<L>  /  Alb  3.3<L>  /  TBili  0.6  /  DBili  x   /  AST  32  /  ALT  71<H>  /  AlkPhos  60  12-02                MICROBIOLOGY:    RADIOLOGY:    ALLERGIES:  Allergy Status Unknown      ===========================================================   191.1

## 2020-12-02 NOTE — PROGRESS NOTE ADULT - ASSESSMENT
59 y/o Male with PMHx of HTN, COPD (former smoker) and thyroid cancer s/p thyroidectomy presenting with fever and sob for one week.    #Acute hypoxemic respiratory failure likely due to SARS-CoV2 pneumonia   #cytokine storm  - no sepsis on admission, currently afebrile, leukocytosis resolved  -11/25 - Venturi Mask  -11/26 - High-Flow NC 40/40  -12/2 - titrated down this AM   - Xray Chest (11/28): Bilateral opacities, unchanged.  - ID consulted, no benefit for RDV  -D-dimer 4493 > 3180, Ferritin 1855 > 886,  > 452, CRP 11.63, Procalc 0.08 > 0.08  -   - S/P toci x 1 dose 11/27  - ID consulted, no benefit from RDV or plasma since past replicative phase  - Continue decadron 6 mg IV daily D6/10  - Continue symbicort   - Duplex: no DVT's  -Therapeutic lovenox discontinued this AM, CT angio to be performed before decision is made to restart at therapeutic dosing     #HTN   - c/w amlodipine 10 mg PO daily    #Thyroid cancer s/p thyroidectomy   - in remission, continue to monitor    DVT: Lovenox 40mg PO BID  GI ppx: Protonix  Diet: DASH  Activity: AAT  FULL CODE    Daily contact [phone]

## 2020-12-02 NOTE — CHART NOTE - NSCHARTNOTEFT_GEN_A_CORE
I made rounds today with the treatment team including the hospitalist, residents,  nurses and  and discussed the patient's current medical status and discharge  planning needs, and reviewed the chart.    T(C): 35.9 (12-02-20 @ 05:00), Max: 36.3 (12-01-20 @ 21:30)  HR: 68 (12-02-20 @ 05:00) (68 - 97)  BP: 136/88 (12-02-20 @ 05:00) (125/82 - 138/78)  RR: 18 (12-02-20 @ 05:00) (18 - 20)  SpO2: 97% (12-02-20 @ 10:16) (95% - 98%)          I reached out to the patient's health care proxy/ responsible family member-           [     ]  I reached                                     and discussed the patient's medical condition,                   family concerns, and discharge planning           [     ]  I left a message with family               [     ]  I personally participated in rounds with the medical team and my resident and discussed the case. My resident reached                   family member/ HCP                                under my direction and supervision  and we reviewed the conversation.          [   x  ]  My resident left a message with family under my direction and supervision with Sanam [phone]           [     ]   My resident attended medical rounds and called the family                [      ]    The following was discussed:  The patient's medical status over the past 24 hrs was reviewed, as well as oxygen needs and medication changes and labs.          [      ]   The following concerns were raised:           [     ] I spent 5-10 minutes on the above discussing medical issues with team members and family and/ or my resident    [     ] I spent 11-20 minutes on the above discussing medical issues with team members and family and/ or my resident    [     ] I spent 21-30 minutes on the above discussing medical issues with team members and family and/ or my resident

## 2020-12-03 LAB
ALBUMIN SERPL ELPH-MCNC: 3.3 G/DL — LOW (ref 3.5–5.2)
ALP SERPL-CCNC: 63 U/L — SIGNIFICANT CHANGE UP (ref 30–115)
ALT FLD-CCNC: 82 U/L — HIGH (ref 0–41)
ANION GAP SERPL CALC-SCNC: 12 MMOL/L — SIGNIFICANT CHANGE UP (ref 7–14)
AST SERPL-CCNC: 35 U/L — SIGNIFICANT CHANGE UP (ref 0–41)
BILIRUB SERPL-MCNC: 0.8 MG/DL — SIGNIFICANT CHANGE UP (ref 0.2–1.2)
BUN SERPL-MCNC: 26 MG/DL — HIGH (ref 10–20)
CALCIUM SERPL-MCNC: 8.9 MG/DL — SIGNIFICANT CHANGE UP (ref 8.5–10.1)
CHLORIDE SERPL-SCNC: 99 MMOL/L — SIGNIFICANT CHANGE UP (ref 98–110)
CO2 SERPL-SCNC: 26 MMOL/L — SIGNIFICANT CHANGE UP (ref 17–32)
CREAT SERPL-MCNC: 0.8 MG/DL — SIGNIFICANT CHANGE UP (ref 0.7–1.5)
GLUCOSE SERPL-MCNC: 108 MG/DL — HIGH (ref 70–99)
HCT VFR BLD CALC: 43.2 % — SIGNIFICANT CHANGE UP (ref 42–52)
HGB BLD-MCNC: 14 G/DL — SIGNIFICANT CHANGE UP (ref 14–18)
MCHC RBC-ENTMCNC: 27.8 PG — SIGNIFICANT CHANGE UP (ref 27–31)
MCHC RBC-ENTMCNC: 32.4 G/DL — SIGNIFICANT CHANGE UP (ref 32–37)
MCV RBC AUTO: 85.9 FL — SIGNIFICANT CHANGE UP (ref 80–94)
NRBC # BLD: 0 /100 WBCS — SIGNIFICANT CHANGE UP (ref 0–0)
PLATELET # BLD AUTO: 278 K/UL — SIGNIFICANT CHANGE UP (ref 130–400)
POTASSIUM SERPL-MCNC: 4.4 MMOL/L — SIGNIFICANT CHANGE UP (ref 3.5–5)
POTASSIUM SERPL-SCNC: 4.4 MMOL/L — SIGNIFICANT CHANGE UP (ref 3.5–5)
PROT SERPL-MCNC: 5.9 G/DL — LOW (ref 6–8)
RBC # BLD: 5.03 M/UL — SIGNIFICANT CHANGE UP (ref 4.7–6.1)
RBC # FLD: 13.3 % — SIGNIFICANT CHANGE UP (ref 11.5–14.5)
SODIUM SERPL-SCNC: 137 MMOL/L — SIGNIFICANT CHANGE UP (ref 135–146)
WBC # BLD: 16.04 K/UL — HIGH (ref 4.8–10.8)
WBC # FLD AUTO: 16.04 K/UL — HIGH (ref 4.8–10.8)

## 2020-12-03 PROCEDURE — 99233 SBSQ HOSP IP/OBS HIGH 50: CPT

## 2020-12-03 RX ORDER — BUDESONIDE AND FORMOTEROL FUMARATE DIHYDRATE 160; 4.5 UG/1; UG/1
2 AEROSOL RESPIRATORY (INHALATION)
Refills: 0 | Status: DISCONTINUED | OUTPATIENT
Start: 2020-12-03 | End: 2020-12-04

## 2020-12-03 RX ORDER — ALBUTEROL 90 UG/1
2 AEROSOL, METERED ORAL EVERY 6 HOURS
Refills: 0 | Status: DISCONTINUED | OUTPATIENT
Start: 2020-12-03 | End: 2020-12-04

## 2020-12-03 RX ADMIN — PANTOPRAZOLE SODIUM 40 MILLIGRAM(S): 20 TABLET, DELAYED RELEASE ORAL at 06:12

## 2020-12-03 RX ADMIN — BUDESONIDE AND FORMOTEROL FUMARATE DIHYDRATE 2 PUFF(S): 160; 4.5 AEROSOL RESPIRATORY (INHALATION) at 07:40

## 2020-12-03 RX ADMIN — ENOXAPARIN SODIUM 100 MILLIGRAM(S): 100 INJECTION SUBCUTANEOUS at 17:08

## 2020-12-03 RX ADMIN — ATORVASTATIN CALCIUM 40 MILLIGRAM(S): 80 TABLET, FILM COATED ORAL at 21:41

## 2020-12-03 RX ADMIN — MONTELUKAST 10 MILLIGRAM(S): 4 TABLET, CHEWABLE ORAL at 11:47

## 2020-12-03 RX ADMIN — Medication 175 MICROGRAM(S): at 06:12

## 2020-12-03 RX ADMIN — AMLODIPINE BESYLATE 10 MILLIGRAM(S): 2.5 TABLET ORAL at 06:11

## 2020-12-03 RX ADMIN — ENOXAPARIN SODIUM 100 MILLIGRAM(S): 100 INJECTION SUBCUTANEOUS at 06:12

## 2020-12-03 RX ADMIN — Medication 6 MILLIGRAM(S): at 06:11

## 2020-12-03 NOTE — PROGRESS NOTE ADULT - SUBJECTIVE AND OBJECTIVE BOX
DAILY PROGRESS NOTE  ===========================================================    Patient Information:  JOAQUIN HERNANDEZ  /  60y  /  Male  /  MRN#: [id number]    Hospital Day: 8d     |:::::::::::::::::::::::::::| SUBJECTIVE |:::::::::::::::::::::::::::|    OVERNIGHT EVENTS: None   TODAY: Patient was seen today at bedside. Review of systems is otherwise negative.    |:::::::::::::::::::::::::::| OBJECTIVE |:::::::::::::::::::::::::::|    VITAL SIGNS: Last 24 Hours  T(C): 36.1 (03 Dec 2020 09:22), Max: 36.1 (02 Dec 2020 17:36)  T(F): 97 (03 Dec 2020 09:22), Max: 97 (03 Dec 2020 09:22)  HR: 94 (03 Dec 2020 09:22) (80 - 94)  BP: 145/85 (03 Dec 2020 09:22) (129/75 - 158/102)  BP(mean): --  RR: 18 (03 Dec 2020 09:22) (16 - 18)  SpO2: 94% (03 Dec 2020 09:22) (87% - 97%)    PHYSICAL EXAM:  GENERAL:   Awake, alert; NAD.  HEENT:  Head NC/AT; Conjunctivae pink, Sclera anicteric; Oral mucosa moist.  CARDIO:   Regular rate; Regular rhythm; S1 & S2.  RESP: crackles b/l  GI:   Soft; NT/ND; BS; No guarding; No rebound tenderness.  EXT:   Stregnth UE 5/5; Strength LE 5/5; No edema.   SKIN:   Intact.    LAB RESULTS:                        14.0   16.04 )-----------( 278      ( 03 Dec 2020 06:00 )             43.2     12-03    137  |  99  |  26<H>  ----------------------------<  108<H>  4.4   |  26  |  0.8    Ca    8.9      03 Dec 2020 06:00  Mg     1.8     12-02    TPro  5.9<L>  /  Alb  3.3<L>  /  TBili  0.8  /  DBili  x   /  AST  35  /  ALT  82<H>  /  AlkPhos  63  12-03                MICROBIOLOGY:    RADIOLOGY:    ALLERGIES:  Allergy Status Unknown      ===========================================================

## 2020-12-03 NOTE — PROGRESS NOTE ADULT - REASON FOR ADMISSION
COPD / covid inf

## 2020-12-03 NOTE — CHART NOTE - NSCHARTNOTEFT_GEN_A_CORE
I made rounds today with the treatment team including the hospitalist, residents,  nurses and  and discussed the patient's current medical status and discharge  planning needs, and reviewed the chart.    T(C): 36.1 (12-03-20 @ 09:22), Max: 36.1 (12-02-20 @ 17:36)  HR: 145 (12-03-20 @ 10:12) (82 - 145)  BP: 145/85 (12-03-20 @ 09:22) (129/75 - 158/102)  RR: 18 (12-03-20 @ 09:22) (16 - 18)  SpO2: 93% (12-03-20 @ 10:12) (87% - 95%)          I reached out to the patient's health care proxy/ responsible family member-           [     ]  I reached                                     and discussed the patient's medical condition,                   family concerns, and discharge planning           [     ]  I left a message with family               [     ]  I personally participated in rounds with the medical team and my resident and discussed the case. My resident reached                   family member/ HCP                                under my direction and supervision  and we reviewed the conversation.          [  x   ]  My resident left a message with family under my direction and supervision for Sanam           [     ]   My resident attended medical rounds and called the family                [      ]    The following was discussed:  The patient's medical status over the past 24 hrs was reviewed, as well as oxygen needs and medication changes and labs.          [      ]   The following concerns were raised:           [     ] I spent 5-10 minutes on the above discussing medical issues with team members and family and/ or my resident    [     ] I spent 11-20 minutes on the above discussing medical issues with team members and family and/ or my resident    [     ] I spent 21-30 minutes on the above discussing medical issues with team members and family and/ or my resident

## 2020-12-03 NOTE — PROGRESS NOTE ADULT - ATTENDING COMMENTS
61 y/o Male with PMHx of HTN, COPD (former smoker) and thyroid cancer s/p thyroidectomy presenting with fever and sob for one week.    #Acute hypoxemic respiratory failure due to SARS-CoV2 pneumonia   #cytokine storm  - no sepsis on admission  CTA (12/2): diffuse b/l reticular/GGOs. No PE.   Duplex: no DVT's  Markers:   -D-dimer 4493 > 3180, Ferritin 1855 > 886,  > 452, CRP 11.63, Procalc 0.08 > 0.08  -     - S/P toci x 1 dose 11/27  - decadron 6 mg IV daily D7/10  -On Therapeutic Lovenox.     -11/25 - Venturi Mask  -11/26 - High-Flow NC 40/40  -12/2 - titrated down from HFNC 40/40 to NC 3L.    12/3 - sating ok on RA at rest, but minimal tachypnea on RA. use 2L NC for comfort.    Walking - HR jumped up to 145. c/w 2L NC.    - Continue symbicort     #Mediastinal Mass on CT : Outpatient f/u with PMD.     #HTN   - c/w amlodipine 10 mg PO daily    #Thyroid cancer s/p thyroidectomy   - in remission, continue to monitor    DVT: Lovenox   GI ppx: Protonix  Diet: DASH  Activity: AAT  FULL CODE    Daily contact [phone] .

## 2020-12-03 NOTE — PROGRESS NOTE ADULT - ASSESSMENT
61 y/o Male with PMHx of HTN, COPD (former smoker) and thyroid cancer s/p thyroidectomy presenting with fever and sob for one week.    #Acute hypoxemic respiratory failure likely due to SARS-CoV2 pneumonia   #cytokine storm  - no sepsis on admission, currently afebrile, leukocytosis resolved  -11/26 - High-Flow NC 40/40  -12/2 - titrated down to 3L NC  -12/3 - on RA, saturating >94%  - Xray Chest (11/28): Bilateral opacities, unchanged.  - ID consulted, no benefit for RDV  -D-dimer 4493 > 3180, Ferritin 1855 > 886,  > 452, CRP 11.63, Procalc 0.08 > 0.08  -   - S/P toci x 1 dose 11/27  - ID consulted, no benefit from RDV or plasma since past replicative phase  - Continue decadron 6 mg IV daily D7/10  - symbicort PRN  - Duplex: no DVT's  -Therapeutic lovenox restarted 12/2  -CT angio 12/2- No PE but there is a 4.5 x 5.5 x 5 cm mass in the right side of the superior mediastinum with displacement of the trachea to the left. This finding should be correlated with the patient's previous surgery.      #HTN   - c/w amlodipine 10 mg PO daily    #Thyroid cancer s/p thyroidectomy   - in remission, continue to monitor    DVT: Lovenox 40mg PO BID  GI ppx: Protonix  Diet: DASH  Activity: AAT  FULL CODE    Daily contact [phone]

## 2020-12-04 ENCOUNTER — TRANSCRIPTION ENCOUNTER (OUTPATIENT)
Age: 60
End: 2020-12-04

## 2020-12-04 VITALS
DIASTOLIC BLOOD PRESSURE: 90 MMHG | HEART RATE: 110 BPM | SYSTOLIC BLOOD PRESSURE: 116 MMHG | OXYGEN SATURATION: 96 % | RESPIRATION RATE: 18 BRPM | TEMPERATURE: 98 F

## 2020-12-04 LAB
ALBUMIN SERPL ELPH-MCNC: 3.6 G/DL — SIGNIFICANT CHANGE UP (ref 3.5–5.2)
ALP SERPL-CCNC: 67 U/L — SIGNIFICANT CHANGE UP (ref 30–115)
ALT FLD-CCNC: 88 U/L — HIGH (ref 0–41)
ANION GAP SERPL CALC-SCNC: 13 MMOL/L — SIGNIFICANT CHANGE UP (ref 7–14)
AST SERPL-CCNC: 35 U/L — SIGNIFICANT CHANGE UP (ref 0–41)
BILIRUB SERPL-MCNC: 1 MG/DL — SIGNIFICANT CHANGE UP (ref 0.2–1.2)
BUN SERPL-MCNC: 25 MG/DL — HIGH (ref 10–20)
CALCIUM SERPL-MCNC: 9.2 MG/DL — SIGNIFICANT CHANGE UP (ref 8.5–10.1)
CHLORIDE SERPL-SCNC: 97 MMOL/L — LOW (ref 98–110)
CO2 SERPL-SCNC: 26 MMOL/L — SIGNIFICANT CHANGE UP (ref 17–32)
CREAT SERPL-MCNC: 0.8 MG/DL — SIGNIFICANT CHANGE UP (ref 0.7–1.5)
GLUCOSE SERPL-MCNC: 154 MG/DL — HIGH (ref 70–99)
HCT VFR BLD CALC: 44.9 % — SIGNIFICANT CHANGE UP (ref 42–52)
HGB BLD-MCNC: 14.6 G/DL — SIGNIFICANT CHANGE UP (ref 14–18)
MCHC RBC-ENTMCNC: 27.7 PG — SIGNIFICANT CHANGE UP (ref 27–31)
MCHC RBC-ENTMCNC: 32.5 G/DL — SIGNIFICANT CHANGE UP (ref 32–37)
MCV RBC AUTO: 85.2 FL — SIGNIFICANT CHANGE UP (ref 80–94)
NRBC # BLD: 0 /100 WBCS — SIGNIFICANT CHANGE UP (ref 0–0)
PLATELET # BLD AUTO: 332 K/UL — SIGNIFICANT CHANGE UP (ref 130–400)
POTASSIUM SERPL-MCNC: 5.5 MMOL/L — HIGH (ref 3.5–5)
POTASSIUM SERPL-SCNC: 5.5 MMOL/L — HIGH (ref 3.5–5)
PROT SERPL-MCNC: 6.2 G/DL — SIGNIFICANT CHANGE UP (ref 6–8)
RBC # BLD: 5.27 M/UL — SIGNIFICANT CHANGE UP (ref 4.7–6.1)
RBC # FLD: 13.5 % — SIGNIFICANT CHANGE UP (ref 11.5–14.5)
SODIUM SERPL-SCNC: 136 MMOL/L — SIGNIFICANT CHANGE UP (ref 135–146)
WBC # BLD: 16.5 K/UL — HIGH (ref 4.8–10.8)
WBC # FLD AUTO: 16.5 K/UL — HIGH (ref 4.8–10.8)

## 2020-12-04 PROCEDURE — 99239 HOSP IP/OBS DSCHRG MGMT >30: CPT

## 2020-12-04 RX ORDER — RIVAROXABAN 15 MG-20MG
1 KIT ORAL
Qty: 30 | Refills: 0
Start: 2020-12-04 | End: 2021-01-02

## 2020-12-04 RX ADMIN — ENOXAPARIN SODIUM 100 MILLIGRAM(S): 100 INJECTION SUBCUTANEOUS at 05:13

## 2020-12-04 RX ADMIN — Medication 6 MILLIGRAM(S): at 05:16

## 2020-12-04 RX ADMIN — MONTELUKAST 10 MILLIGRAM(S): 4 TABLET, CHEWABLE ORAL at 11:25

## 2020-12-04 RX ADMIN — PANTOPRAZOLE SODIUM 40 MILLIGRAM(S): 20 TABLET, DELAYED RELEASE ORAL at 05:12

## 2020-12-04 RX ADMIN — Medication 175 MICROGRAM(S): at 05:14

## 2020-12-04 RX ADMIN — AMLODIPINE BESYLATE 10 MILLIGRAM(S): 2.5 TABLET ORAL at 05:12

## 2020-12-04 RX ADMIN — CHLORHEXIDINE GLUCONATE 1 APPLICATION(S): 213 SOLUTION TOPICAL at 05:15

## 2020-12-04 NOTE — DISCHARGE NOTE PROVIDER - NSDCMRMEDTOKEN_GEN_ALL_CORE_FT
Advair Diskus: 2 puff(s) inhaled 2 times a day  amLODIPine 10 mg oral tablet: 1 tab(s) orally once a day  levothyroxine 175 mcg (0.175 mg) oral tablet: 1 tab(s) orally once a day  omeprazole 40 mg oral delayed release capsule: 1 cap(s) orally once a day  pravastatin 40 mg oral tablet: 1 tab(s) orally once a day  predniSONE 20 mg oral tablet: Starting today:  take 60mg once a day for 3  days then  take 40mg once a day for 3 days then  take 20mg once a day for 3 days then STOP  Singulair 10 mg oral tablet: 1 tab(s) orally once a day  Spiriva 18 mcg inhalation capsule: 1 cap(s) inhaled once a day  Toprol-XL 25 mg oral tablet, extended release: 1 tab(s) orally once a day  Ventolin HFA 90 mcg/inh inhalation aerosol: 2 puff(s) inhaled 4 times a day   Advair Diskus: 2 puff(s) inhaled 2 times a day  amLODIPine 10 mg oral tablet: 1 tab(s) orally once a day  levothyroxine 175 mcg (0.175 mg) oral tablet: 1 tab(s) orally once a day  omeprazole 40 mg oral delayed release capsule: 1 cap(s) orally once a day  pravastatin 40 mg oral tablet: 1 tab(s) orally once a day  predniSONE 20 mg oral tablet: Starting today:  take 60mg once a day for 3  days then  take 40mg once a day for 3 days then  take 20mg once a day for 3 days then STOP  Singulair 10 mg oral tablet: 1 tab(s) orally once a day  Spiriva 18 mcg inhalation capsule: 1 cap(s) inhaled once a day  Toprol-XL 25 mg oral tablet, extended release: 1 tab(s) orally once a day  Ventolin HFA 90 mcg/inh inhalation aerosol: 2 puff(s) inhaled 4 times a day  Xarelto 10 mg oral tablet: 1 tab(s) orally once a day x 30 days    Advair Diskus: 2 puff(s) inhaled 2 times a day  amLODIPine 10 mg oral tablet: 1 tab(s) orally once a day  levothyroxine 175 mcg (0.175 mg) oral tablet: 1 tab(s) orally once a day  omeprazole 40 mg oral delayed release capsule: 1 cap(s) orally once a day  pravastatin 40 mg oral tablet: 1 tab(s) orally once a day  Singulair 10 mg oral tablet: 1 tab(s) orally once a day  Spiriva 18 mcg inhalation capsule: 1 cap(s) inhaled once a day  Toprol-XL 25 mg oral tablet, extended release: 1 tab(s) orally once a day  Ventolin HFA 90 mcg/inh inhalation aerosol: 2 puff(s) inhaled 4 times a day  Xarelto 10 mg oral tablet: 1 tab(s) orally once a day x 30 days    Advair Diskus: 2 puff(s) inhaled 2 times a day  amLODIPine 10 mg oral tablet: 1 tab(s) orally once a day  levothyroxine 175 mcg (0.175 mg) oral tablet: 1 tab(s) orally once a day  omeprazole 40 mg oral delayed release capsule: 1 cap(s) orally once a day  pravastatin 40 mg oral tablet: 1 tab(s) orally once a day  predniSONE 20 mg oral tablet: 2 tab(s) orally once a day x 2 days   Singulair 10 mg oral tablet: 1 tab(s) orally once a day  Spiriva 18 mcg inhalation capsule: 1 cap(s) inhaled once a day  Toprol-XL 25 mg oral tablet, extended release: 1 tab(s) orally once a day  Ventolin HFA 90 mcg/inh inhalation aerosol: 2 puff(s) inhaled 4 times a day  Xarelto 10 mg oral tablet: 1 tab(s) orally once a day x 30 days

## 2020-12-04 NOTE — DISCHARGE NOTE PROVIDER - CARE PROVIDER_API CALL
Osman Carrillo  Pediatrics  [address] [place] [id number]  Phone: [phone]  Fax: [phone]  Follow Up Time: 1 week

## 2020-12-04 NOTE — DISCHARGE NOTE PROVIDER - NSDCHHHOMEBOUNDOTHER_GEN_ALL_CORE_FT
Requires teaching for medications at home; he is COVID-19 positive and cannot leave the home or go to another facility because he is still infectious

## 2020-12-04 NOTE — DISCHARGE NOTE PROVIDER - HOSPITAL COURSE
61 y/o Male with PMHx of HTN, COPD (former smoker) and thyroid cancer s/p thyroidectomy presenting with fever and sob for one week.  Was admitted for Acute hypoxemic respiratory failure likely due to SARS-CoV2 pneumonia complicated by cytokine release syndrome.  CXR showed bilateral opacities consistent with COVID-19 pneumonia.  patient was not septic on admission though he had leukocytosis on admission which resolved later.  Elevated inflammatory markers included high D-dimer, ferritin, LDH, CRP; procal was within normal limits.  No PE was found on [facility] duplex of bilateral LEs and CT angio was negative for PE however a mass in the right side of superior mediastinum was found with displacement of trache to the left; unclear whether this is related to hx of thyroidectomy but patient will followup outpatient with PCP for further eval.  Was initially started on high flow nasal cannula due to desating in the ED.  Was given tocilizumab on 11/27 per infectious disease recommendation.  No remdesivir or convalescent plasma was given due to the being in the latent phase of patient's illness.  Was started on dexamethasone and given for 8 days which showed improvement.  Repeat CXR showed stable bilateral opacities.  Patietn was titrated down from high flow to standard nasal cannula 3L flowrate. and eventually to room air on 12/3 and has been saturating well since then at rest and on ambulation.  Does not need home oxygen.      #Acute hypoxemic respiratory failure likely due to SARS-CoV2 pneumonia   #cytokine storm  - no sepsis on admission, currently afebrile, leukocytosis resolved  -11/26 - High-Flow NC 40/40  -12/2 - titrated down to 3L NC  -12/3 - on RA, saturating >94%  - Xray Chest (11/28): Bilateral opacities, unchanged.  - ID consulted, no benefit for RDV  -D-dimer 4493 > 3180, Ferritin 1855 > 886,  > 452, CRP 11.63, Procalc 0.08 > 0.08  -   - S/P toci x 1 dose 11/27  - ID consulted, no benefit from RDV or plasma since past replicative phase  - Continue decadron 6 mg IV daily D7/10  - symbicort PRN  - Duplex: no DVT's  -Therapeutic lovenox restarted 12/2  -CT angio 12/2- No PE but there is a 4.5 x 5.5 x 5 cm mass in the right side of the superior mediastinum with displacement of the trachea to the left. This finding should be correlated with the patient's previous surgery.      #HTN   - c/w amlodipine 10 mg PO daily    #Thyroid cancer s/p thyroidectomy   - in remission, continue to monitor    DVT: Lovenox 40mg PO BID  GI ppx: Protonix  Diet: DASH  Activity: AAT  FULL CODE    Daily contact [phone]     59 y/o Male with PMHx of HTN, COPD (former smoker) and thyroid cancer s/p thyroidectomy presenting with fever and sob for one week.  Was admitted for Acute hypoxemic respiratory failure likely due to SARS-CoV2 pneumonia complicated by cytokine release syndrome.  CXR showed bilateral opacities consistent with COVID-19 pneumonia.  patient was not septic on admission though he had leukocytosis on admission which resolved later.  Elevated inflammatory markers included high D-dimer, ferritin, LDH, CRP; procal was within normal limits.  No PE was found on [facility] duplex of bilateral LEs and CT angio was negative for PE however a mass in the right side of superior mediastinum was found with displacement of trache to the left; unclear whether this is related to hx of thyroidectomy but patient will followup outpatient with PCP for further eval.  Was initially started on high flow nasal cannula due to desating in the ED.  Was given tocilizumab on 11/27 per infectious disease recommendation.  No remdesivir or convalescent plasma was given due to the being in the latent phase of patient's illness.  Was started on dexamethasone and given for 8 days which showed improvement.  Repeat CXR showed stable bilateral opacities.  Patietn was titrated down from high flow to standard nasal cannula 3L flowrate. and eventually to room air on 12/3 and has been saturating well since then at rest and on ambulation.  Will start anticoagulative therapy with xarelto upon discharge.  Does not need home oxygen.  on 12/4/20 patient is medically stable for discharge and patient is made aware of his care plan; is agreeable and expresses understanding.      #Acute hypoxemic respiratory failure likely due to SARS-CoV2 pneumonia   #cytokine storm  - no sepsis on admission, currently afebrile, leukocytosis resolved  -11/26 - High-Flow NC 40/40  -12/2 - titrated down to 3L NC  -12/3 - on RA, saturating >94%  - Xray Chest (11/28): Bilateral opacities, unchanged.  - ID consulted, no benefit for RDV  -D-dimer 4493 > 3180, Ferritin 1855 > 886,  > 452, CRP 11.63, Procalc 0.08 > 0.08  -   - S/P toci x 1 dose 11/27  - ID consulted, no benefit from RDV or plasma since past replicative phase  - Continue decadron 6 mg IV daily D7/10  - symbicort PRN  - Duplex: no DVT's  -Therapeutic lovenox restarted 12/2  -CT angio 12/2- No PE but there is a 4.5 x 5.5 x 5 cm mass in the right side of the superior mediastinum with displacement of the trachea to the left. This finding should be correlated with the patient's previous surgery.      #HTN   - c/w amlodipine 10 mg PO daily    #Thyroid cancer s/p thyroidectomy   - in remission, continue to monitor    DVT: Lovenox 40mg PO BID  GI ppx: Protonix  Diet: DASH  Activity: AAT  FULL CODE    Daily contact [phone]     59 y/o Male with PMHx of HTN, COPD (former smoker) and thyroid cancer s/p thyroidectomy presenting with fever and sob for one week.  Was admitted for Acute hypoxemic respiratory failure likely due to SARS-CoV2 pneumonia complicated by cytokine release syndrome.  CXR showed bilateral opacities consistent with COVID-19 pneumonia.  patient was not septic on admission though he had leukocytosis on admission which resolved later.  Elevated inflammatory markers included high D-dimer, ferritin, LDH, CRP; procal was within normal limits.  No PE was found on [facility] duplex of bilateral LEs and CT angio was negative for PE however a mass in the right side of superior mediastinum was found with displacement of trache to the left; unclear whether this is related to hx of thyroidectomy but patient will followup outpatient with PCP for further eval.  Was initially started on high flow nasal cannula due to desating in the ED.  Was given tocilizumab on 11/27 per infectious disease recommendation.  No remdesivir or convalescent plasma was given due to the being in the latent phase of patient's illness.  Was started on dexamethasone and given for 8 days which showed improvement.  Repeat CXR showed stable bilateral opacities.  Patietn was titrated down from high flow to standard nasal cannula 3L flowrate. and eventually to room air on 12/3 and has been saturating well since then at rest and on ambulation.  Will start anticoagulative therapy with xarelto upon discharge.  Does not need home oxygen.  on 12/4/20 patient is medically stable for discharge and patient is made aware of his care plan; is agreeable and expresses understanding.  Patient understanding that he is still infectious for the virus and will quarantine for a minimum 14 days.      #Acute hypoxemic respiratory failure likely due to SARS-CoV2 pneumonia   #cytokine storm  - no sepsis on admission, currently afebrile, leukocytosis resolved  -11/26 - High-Flow NC 40/40  -12/2 - titrated down to 3L NC  -12/3 - on RA, saturating >94%  - Xray Chest (11/28): Bilateral opacities, unchanged.  - ID consulted, no benefit for RDV  -D-dimer 4493 > 3180, Ferritin 1855 > 886,  > 452, CRP 11.63, Procalc 0.08 > 0.08  -   - S/P toci x 1 dose 11/27  - ID consulted, no benefit from RDV or plasma since past replicative phase  - Continue decadron 6 mg IV daily D7/10  - symbicort PRN  - Duplex: no DVT's  -Therapeutic lovenox restarted 12/2  -CT angio 12/2- No PE but there is a 4.5 x 5.5 x 5 cm mass in the right side of the superior mediastinum with displacement of the trachea to the left. This finding should be correlated with the patient's previous surgery.      #HTN   - c/w amlodipine 10 mg PO daily    #Thyroid cancer s/p thyroidectomy   - in remission, continue to monitor    DVT: Lovenox 40mg PO BID  GI ppx: Protonix  Diet: DASH  Activity: AAT  FULL CODE    Daily contact [phone]     61 y/o Male with PMHx of HTN, COPD (former smoker) and thyroid cancer s/p thyroidectomy presenting with fever and sob for one week.  Was admitted for Acute hypoxemic respiratory failure likely due to SARS-CoV2 pneumonia complicated by cytokine release syndrome.  CXR showed bilateral opacities consistent with COVID-19 pneumonia.  patient was not septic on admission though he had leukocytosis on admission which resolved later.  Elevated inflammatory markers included high D-dimer, ferritin, LDH, CRP; procal was within normal limits.  No PE was found on [facility] duplex of bilateral LEs and CT angio was negative for PE however a mass in the right side of superior mediastinum was found with displacement of trache to the left; unclear whether this is related to hx of thyroidectomy but patient will followup outpatient with PCP for further eval.  Was initially started on high flow nasal cannula due to desating in the ED.  Was given tocilizumab on 11/27 per infectious disease recommendation.  No remdesivir or convalescent plasma was given due to the being in the latent phase of patient's illness.  Was started on dexamethasone and given for 8 days which showed improvement.  Repeat CXR showed stable bilateral opacities.  Patietn was titrated down from high flow to standard nasal cannula 3L flowrate. and eventually to room air on 12/3 and has been saturating well since then at rest and on ambulation.  Will start anticoagulative therapy with xarelto upon discharge.  Does not need home oxygen.  on 12/4/20 patient is medically stable for discharge and patient is made aware of his care plan; is agreeable and expresses understanding.  Patient understanding that he is still infectious for the virus and will quarantine for a minimum 14 days.      #Acute hypoxemic respiratory failure likely due to SARS-CoV2 pneumonia   #cytokine storm  - no sepsis on admission, currently afebrile, leukocytosis resolved  -11/26 - High-Flow NC 40/40  -12/2 - titrated down to 3L NC  -12/3 - on RA, saturating >94%  - Xray Chest (11/28): Bilateral opacities, unchanged.  - ID consulted, no benefit for RDV  -D-dimer 4493 > 3180, Ferritin 1855 > 886,  > 452, CRP 11.63, Procalc 0.08 > 0.08  -   - S/P toci x 1 dose 11/27  - ID consulted, no benefit from RDV or plasma since past replicative phase  - Continue decadron 6 mg IV daily D7/10  - symbicort PRN  - Duplex: no DVT's  -Therapeutic lovenox restarted 12/2  -CT angio 12/2- No PE but there is a 4.5 x 5.5 x 5 cm mass in the right side of the superior mediastinum with displacement of the trachea to the left. This finding should be correlated with the patient's previous surgery.      #HTN   - c/w amlodipine 10 mg PO daily    #Thyroid cancer s/p thyroidectomy   - in remission, continue to monitor    DVT: Lovenox 40mg PO BID  GI ppx: Protonix  Diet: DASH  Activity: AAT  FULL CODE    Daily contact [phone]    Attending Note:    61 y/o Male with PMHx of HTN, COPD (former smoker) and thyroid cancer s/p thyroidectomy presenting with fever and sob for one week.    #Acute hypoxemic respiratory failure due to SARS-CoV2 pneumonia   #cytokine storm  - no sepsis on admission  CTA (12/2): diffuse b/l reticular/GGOs. No PE.   Duplex: no DVT's  Markers:   -D-dimer 4493 > 3180, Ferritin 1855 > 886,  > 452, CRP 11.63, Procalc 0.08 > 0.08  -     - S/P toci x 1 dose 11/27  - decadron 6 mg IV daily D8/10  -On Therapeutic Lovenox.     -11/25 - Venturi Mask  -11/26 - High-Flow NC 40/40  -12/2 - titrated down from HFNC 40/40 to NC 3L.    12/3 - sating ok on RA at rest, but minimal tachypnea on RA. use 2L NC for comfort.    Walking - HR jumped up to 145. c/w 2L NC.  12/4: SAting ok on RA at rest and ambulation. OK going home today. PO PRednisone x 2 more days. Xarelto 10 QD for PPx for 30 days upon discharge.    - Continue symbicort     #Mediastinal Mass on CT : Outpatient f/u with PMD.     #HTN   - c/w amlodipine 10 mg PO daily    #Thyroid cancer s/p thyroidectomy   - in remission, continue to monitor    D/c to home.

## 2020-12-04 NOTE — DISCHARGE NOTE PROVIDER - NSDCCPCAREPLAN_GEN_ALL_CORE_FT
PRINCIPAL DISCHARGE DIAGNOSIS  Diagnosis: COVID-19  Assessment and Plan of Treatment: Patient was admitted for acute hypoxemic respiratory failure secondary to COVID-10 pneumonia.  You were given steroids and supplemental oxygen therapy to maintain breathing.  You improved steadily throughout hospitalization and on discharge day you are medically stable to go home.  You should followup with your primary care doctor within 1 week for further evaluation.  You will start taking xarelto after discharge to prevent blood clots that the COVID-19 virus may contribtue to.  Continue taking your other medications.  Take precuations to socially isolate from other contacts as you are still contagious for the virus and have been exposed to the virus in the hospital.  If you become short of breath after discharge, call 911 or come to the ED for evaluation.      SECONDARY DISCHARGE DIAGNOSES  Diagnosis: Hypoxia  Assessment and Plan of Treatment:     Diagnosis: COPD (chronic obstructive pulmonary disease)  Assessment and Plan of Treatment: COPD (chronic obstructive pulmonary disease)    Diagnosis: Shortness of breath  Assessment and Plan of Treatment:      PRINCIPAL DISCHARGE DIAGNOSIS  Diagnosis: COVID-19  Assessment and Plan of Treatment: Patient was admitted for acute hypoxemic respiratory failure secondary to COVID-10 pneumonia.  You were given steroids and supplemental oxygen therapy to maintain breathing.  You improved steadily throughout hospitalization and on discharge day you are medically stable to go home.  You should followup with your primary care doctor within 1 week for further evaluation.  You will start taking xarelto after discharge to prevent blood clots that the COVID-19 virus may contribtue to.  Continue taking your other medications.  Take precuations to socially isolate from other contacts as you are still contagious for the virus and have been exposed to the virus in the hospital; quaratine for at least 14 days.  If you become short of breath after discharge, call 911 or come to the ED for evaluation.      SECONDARY DISCHARGE DIAGNOSES  Diagnosis: Hypoxia  Assessment and Plan of Treatment:     Diagnosis: COPD (chronic obstructive pulmonary disease)  Assessment and Plan of Treatment: COPD (chronic obstructive pulmonary disease)    Diagnosis: Shortness of breath  Assessment and Plan of Treatment:      PRINCIPAL DISCHARGE DIAGNOSIS  Diagnosis: COVID-19  Assessment and Plan of Treatment: Patient was admitted for acute hypoxemic respiratory failure secondary to COVID-10 pneumonia.  You were given steroids and supplemental oxygen therapy to maintain breathing.  You improved steadily throughout hospitalization and on discharge day you are medically stable to go home.  You should followup with your primary care doctor within 1 week for further evaluation.  You will start taking xarelto after discharge to prevent blood clots that the COVID-19 virus may contribtue to.  Continue taking your other medications.  Take precuations to socially isolate from other contacts as you are still contagious for the virus and have been exposed to the virus in the hospital; quaratine for at least 14 days.  If you become short of breath after discharge, call 911 or come to the ED for evaluation. See PMD soon after Quarantine ends.      SECONDARY DISCHARGE DIAGNOSES  Diagnosis: Hypoxia  Assessment and Plan of Treatment:     Diagnosis: COPD (chronic obstructive pulmonary disease)  Assessment and Plan of Treatment: COPD (chronic obstructive pulmonary disease)    Diagnosis: Shortness of breath  Assessment and Plan of Treatment:      PRINCIPAL DISCHARGE DIAGNOSIS  Diagnosis: COVID-19  Assessment and Plan of Treatment: Patient was admitted for acute hypoxemic respiratory failure secondary to COVID-10 pneumonia.  You were given steroids and supplemental oxygen therapy to maintain breathing.  You improved steadily throughout hospitalization and on discharge day you are medically stable to go home.  You should followup with your primary care doctor within 1 week for further evaluation.  You will start taking xarelto after discharge to prevent blood clots that the COVID-19 virus may contribtue to.  Continue taking your other medications.  Take precuations to socially isolate from other contacts as you are still contagious for the virus and have been exposed to the virus in the hospital; quaratine for at least 14 days.  If you become short of breath after discharge, call 911 or come to the ED for evaluation. See PMD soon after Quarantine ends.  Mediastinal Mass:   CTA (12/2/20):  1. No evidence of pulmonary embolism.  2. Scattered patches of reticular and airspace ground glass and infiltrative opacities are noted throughout both lung fields. These findings are compatible with Covid-19 pneumonia.  3. Status post sternotomy with surgical clips are noted in the anterior mediastinum. There is a 4.5 x 5.5 x 5 cm mass in the right side of the superior mediastinum with displacement of the trachea to the left. This finding should be correlated with the patient's previous surgery.  4. 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  Given the visualized mediastinal mass, please discuss with your PMD on next follow up - further surveillance plans.         SECONDARY DISCHARGE DIAGNOSES  Diagnosis: Hypoxia  Assessment and Plan of Treatment:     Diagnosis: COPD (chronic obstructive pulmonary disease)  Assessment and Plan of Treatment: COPD (chronic obstructive pulmonary disease)    Diagnosis: Shortness of breath  Assessment and Plan of Treatment:

## 2020-12-04 NOTE — CHART NOTE - NSCHARTNOTEFT_GEN_A_CORE
I made rounds today with the treatment team including the hospitalist, residents,  nurses and  and discussed the patient's current medical status and discharge  planning needs, and reviewed the chart.    T(C): 36.1 (12-04-20 @ 09:49), Max: 36.2 (12-03-20 @ 13:01)  HR: 139 (12-04-20 @ 10:53) (67 - 139)  BP: 131/89 (12-04-20 @ 09:49) (131/89 - 148/97)  RR: 18 (12-04-20 @ 09:49) (16 - 18)  SpO2: 93% (12-04-20 @ 10:53) (93% - 98%)          I reached out to the patient's health care proxy/ responsible family member-           [     ]  I reached                                     and discussed the patient's medical condition,                   family concerns, and discharge planning           [     ]  I left a message with family               [     ]  I personally participated in rounds with the medical team and my resident and discussed the case. My resident reached                   family member/ HCP                                under my direction and supervision  and we reviewed the conversation.          [  x   ]  My resident left a message with family under my direction and supervision           [     ]   My resident attended medical rounds and called the family                [      ]    The following was discussed:  The patient's medical status over the past 24 hrs was reviewed, as well as oxygen needs and medication changes and labs.          [      ]   The following concerns were raised:           [     ] I spent 5-10 minutes on the above discussing medical issues with team members and family and/ or my resident    [     ] I spent 11-20 minutes on the above discussing medical issues with team members and family and/ or my resident    [     ] I spent 21-30 minutes on the above discussing medical issues with team members and family and/ or my resident

## 2020-12-04 NOTE — DISCHARGE NOTE NURSING/CASE MANAGEMENT/SOCIAL WORK - PATIENT PORTAL LINK FT
You can access the FollowMyHealth Patient Portal offered by [facility] by registering at the following website: [email or web address]. By joining [facility]’s FollowMyHealth portal, you will also be able to view your health information using other applications (apps) compatible with our system.

## 2020-12-09 DIAGNOSIS — J44.0 CHRONIC OBSTRUCTIVE PULMONARY DISEASE WITH (ACUTE) LOWER RESPIRATORY INFECTION: ICD-10-CM

## 2020-12-09 DIAGNOSIS — U07.1 COVID-19: ICD-10-CM

## 2020-12-09 DIAGNOSIS — E78.5 HYPERLIPIDEMIA, UNSPECIFIED: ICD-10-CM

## 2020-12-09 DIAGNOSIS — E66.01 MORBID (SEVERE) OBESITY DUE TO EXCESS CALORIES: ICD-10-CM

## 2020-12-09 DIAGNOSIS — R94.31 ABNORMAL ELECTROCARDIOGRAM [ECG] [EKG]: ICD-10-CM

## 2020-12-09 DIAGNOSIS — Z85.850 PERSONAL HISTORY OF MALIGNANT NEOPLASM OF THYROID: ICD-10-CM

## 2020-12-09 DIAGNOSIS — E89.0 POSTPROCEDURAL HYPOTHYROIDISM: ICD-10-CM

## 2020-12-09 DIAGNOSIS — R00.0 TACHYCARDIA, UNSPECIFIED: ICD-10-CM

## 2020-12-09 DIAGNOSIS — R06.02 SHORTNESS OF BREATH: ICD-10-CM

## 2020-12-09 DIAGNOSIS — J44.1 CHRONIC OBSTRUCTIVE PULMONARY DISEASE WITH (ACUTE) EXACERBATION: ICD-10-CM

## 2020-12-09 DIAGNOSIS — I10 ESSENTIAL (PRIMARY) HYPERTENSION: ICD-10-CM

## 2020-12-09 DIAGNOSIS — J98.59 OTHER DISEASES OF MEDIASTINUM, NOT ELSEWHERE CLASSIFIED: ICD-10-CM

## 2020-12-09 DIAGNOSIS — J39.8 OTHER SPECIFIED DISEASES OF UPPER RESPIRATORY TRACT: ICD-10-CM

## 2020-12-09 DIAGNOSIS — J45.909 UNSPECIFIED ASTHMA, UNCOMPLICATED: ICD-10-CM

## 2020-12-09 DIAGNOSIS — J12.89 OTHER VIRAL PNEUMONIA: ICD-10-CM

## 2020-12-09 DIAGNOSIS — R79.89 OTHER SPECIFIED ABNORMAL FINDINGS OF BLOOD CHEMISTRY: ICD-10-CM

## 2020-12-09 DIAGNOSIS — D72.829 ELEVATED WHITE BLOOD CELL COUNT, UNSPECIFIED: ICD-10-CM

## 2020-12-09 DIAGNOSIS — R73.03 PREDIABETES: ICD-10-CM

## 2020-12-09 DIAGNOSIS — J96.01 ACUTE RESPIRATORY FAILURE WITH HYPOXIA: ICD-10-CM

## 2020-12-09 DIAGNOSIS — Z87.891 PERSONAL HISTORY OF NICOTINE DEPENDENCE: ICD-10-CM

## 2020-12-09 DIAGNOSIS — I45.10 UNSPECIFIED RIGHT BUNDLE-BRANCH BLOCK: ICD-10-CM

## 2020-12-22 ENCOUNTER — APPOINTMENT (OUTPATIENT)
Dept: OTOLARYNGOLOGY | Facility: CLINIC | Age: 60
End: 2020-12-22
Payer: COMMERCIAL

## 2020-12-22 VITALS — WEIGHT: 260 LBS | BODY MASS INDEX: 35.21 KG/M2 | TEMPERATURE: 98.7 F | HEIGHT: 72 IN

## 2020-12-22 DIAGNOSIS — Z81.1 FAMILY HISTORY OF ALCOHOL ABUSE AND DEPENDENCE: ICD-10-CM

## 2020-12-22 DIAGNOSIS — Z87.891 PERSONAL HISTORY OF NICOTINE DEPENDENCE: ICD-10-CM

## 2020-12-22 DIAGNOSIS — J38.01 PARALYSIS OF VOCAL CORDS AND LARYNX, UNILATERAL: ICD-10-CM

## 2020-12-22 DIAGNOSIS — J44.9 CHRONIC OBSTRUCTIVE PULMONARY DISEASE, UNSPECIFIED: ICD-10-CM

## 2020-12-22 DIAGNOSIS — Z85.850 PERSONAL HISTORY OF MALIGNANT NEOPLASM OF THYROID: ICD-10-CM

## 2020-12-22 DIAGNOSIS — J45.909 UNSPECIFIED ASTHMA, UNCOMPLICATED: ICD-10-CM

## 2020-12-22 DIAGNOSIS — I10 ESSENTIAL (PRIMARY) HYPERTENSION: ICD-10-CM

## 2020-12-22 PROCEDURE — 31575 DIAGNOSTIC LARYNGOSCOPY: CPT

## 2020-12-22 PROCEDURE — 99072 ADDL SUPL MATRL&STAF TM PHE: CPT

## 2020-12-22 PROCEDURE — 99204 OFFICE O/P NEW MOD 45 MIN: CPT | Mod: 25

## 2020-12-22 RX ORDER — EZETIMIBE 10 MG/1
10 TABLET ORAL
Qty: 30 | Refills: 0 | Status: ACTIVE | COMMUNITY
Start: 2020-12-11

## 2020-12-22 RX ORDER — HYDROCHLOROTHIAZIDE 25 MG/1
25 TABLET ORAL
Qty: 90 | Refills: 0 | Status: ACTIVE | COMMUNITY
Start: 2020-09-28

## 2020-12-22 RX ORDER — TIOTROPIUM BROMIDE INHALATION SPRAY 1.56 UG/1
SPRAY, METERED RESPIRATORY (INHALATION)
Refills: 0 | Status: ACTIVE | COMMUNITY

## 2020-12-22 RX ORDER — TIOTROPIUM BROMIDE 18 UG/1
18 CAPSULE ORAL; RESPIRATORY (INHALATION)
Qty: 90 | Refills: 0 | Status: ACTIVE | COMMUNITY
Start: 2020-10-18

## 2020-12-22 RX ORDER — FLUTICASONE PROPION/SALMETEROL 500-50 MCG
BLISTER, WITH INHALATION DEVICE INHALATION
Refills: 0 | Status: ACTIVE | COMMUNITY

## 2020-12-22 RX ORDER — PREDNISONE 10 MG/1
10 TABLET ORAL
Qty: 60 | Refills: 0 | Status: ACTIVE | COMMUNITY
Start: 2020-12-15

## 2020-12-22 RX ORDER — MONTELUKAST 10 MG/1
TABLET, FILM COATED ORAL
Refills: 0 | Status: ACTIVE | COMMUNITY

## 2020-12-22 RX ORDER — PRAVASTATIN SODIUM 40 MG/1
40 TABLET ORAL
Qty: 90 | Refills: 0 | Status: ACTIVE | COMMUNITY
Start: 2020-10-13

## 2020-12-22 RX ORDER — AMLODIPINE BESYLATE 10 MG/1
10 TABLET ORAL
Qty: 90 | Refills: 0 | Status: ACTIVE | COMMUNITY
Start: 2020-12-04

## 2020-12-22 RX ORDER — METOPROLOL SUCCINATE 25 MG/1
25 TABLET, EXTENDED RELEASE ORAL
Qty: 90 | Refills: 0 | Status: ACTIVE | COMMUNITY
Start: 2020-12-18

## 2020-12-22 RX ORDER — IBUPROFEN 600 MG/1
600 TABLET, FILM COATED ORAL
Qty: 270 | Refills: 0 | Status: ACTIVE | COMMUNITY
Start: 2020-12-11

## 2020-12-22 RX ORDER — RIVAROXABAN 10 MG/1
10 TABLET, FILM COATED ORAL
Qty: 30 | Refills: 0 | Status: ACTIVE | COMMUNITY
Start: 2020-12-04

## 2020-12-22 RX ORDER — ROFLUMILAST 500 UG/1
500 TABLET ORAL
Qty: 90 | Refills: 0 | Status: ACTIVE | COMMUNITY
Start: 2020-10-23

## 2020-12-22 RX ORDER — METOPROLOL TARTRATE 75 MG/1
TABLET, FILM COATED ORAL
Refills: 0 | Status: ACTIVE | COMMUNITY

## 2020-12-22 RX ORDER — ALBUTEROL SULFATE 90 UG/1
108 (90 BASE) INHALANT RESPIRATORY (INHALATION)
Qty: 18 | Refills: 0 | Status: ACTIVE | COMMUNITY
Start: 2020-12-16

## 2020-12-22 RX ORDER — LEVOTHYROXINE SODIUM 0.17 MG/1
TABLET ORAL
Refills: 0 | Status: ACTIVE | COMMUNITY

## 2020-12-22 NOTE — HISTORY OF PRESENT ILLNESS
[de-identified] : Patient presents today due due right side mediastinum mass and displacement of the trachea. Patient recently hospitalized due to COVID pneumonia 11/2020. Patient has h/o total thyroidectomy about 5 years ago. Hoarseness since then. Dysphagia. No choking. Feels like something is stuck in his throat. Pt is s/p thyroidectomy and neck dissection and iodine therapy.

## 2020-12-30 ENCOUNTER — APPOINTMENT (OUTPATIENT)
Dept: CARDIOTHORACIC SURGERY | Facility: CLINIC | Age: 60
End: 2020-12-30
Payer: COMMERCIAL

## 2020-12-30 ENCOUNTER — OUTPATIENT (OUTPATIENT)
Dept: OUTPATIENT SERVICES | Facility: HOSPITAL | Age: 60
LOS: 1 days | Discharge: HOME | End: 2020-12-30
Payer: COMMERCIAL

## 2020-12-30 VITALS
HEART RATE: 93 BPM | HEIGHT: 72 IN | OXYGEN SATURATION: 93 % | WEIGHT: 265 LBS | SYSTOLIC BLOOD PRESSURE: 122 MMHG | TEMPERATURE: 98.3 F | DIASTOLIC BLOOD PRESSURE: 84 MMHG | BODY MASS INDEX: 35.89 KG/M2 | RESPIRATION RATE: 18 BRPM

## 2020-12-30 DIAGNOSIS — J98.59 OTHER DISEASES OF MEDIASTINUM, NOT ELSEWHERE CLASSIFIED: ICD-10-CM

## 2020-12-30 DIAGNOSIS — E89.0 POSTPROCEDURAL HYPOTHYROIDISM: Chronic | ICD-10-CM

## 2020-12-30 LAB — T4 FREE SERPL-MCNC: 1.8 NG/DL

## 2020-12-30 PROCEDURE — 99213 OFFICE O/P EST LOW 20 MIN: CPT

## 2020-12-30 PROCEDURE — 99072 ADDL SUPL MATRL&STAF TM PHE: CPT

## 2020-12-30 PROCEDURE — 71046 X-RAY EXAM CHEST 2 VIEWS: CPT | Mod: 26

## 2020-12-30 RX ORDER — PREDNISOLONE 5 MG/1
TABLET ORAL
Refills: 0 | Status: DISCONTINUED | COMMUNITY
End: 2020-12-30

## 2020-12-30 RX ORDER — PREDNISONE 20 MG/1
20 TABLET ORAL
Qty: 4 | Refills: 0 | Status: DISCONTINUED | COMMUNITY
Start: 2020-12-04 | End: 2020-12-30

## 2020-12-30 RX ORDER — LEVOFLOXACIN 500 MG/1
500 TABLET, FILM COATED ORAL
Qty: 7 | Refills: 0 | Status: DISCONTINUED | COMMUNITY
Start: 2020-11-24 | End: 2020-12-30

## 2020-12-30 RX ORDER — AMOXICILLIN AND CLAVULANATE POTASSIUM 875; 125 MG/1; MG/1
875-125 TABLET, COATED ORAL
Qty: 14 | Refills: 0 | Status: DISCONTINUED | COMMUNITY
Start: 2020-11-18 | End: 2020-12-30

## 2020-12-30 NOTE — DATA REVIEWED
[FreeTextEntry1] : CTA 12/2/2020:\par IMPRESSION:\par \par 1. No evidence of pulmonary embolism.\par \par 2. Scattered patches of reticular and airspace ground glass and infiltrative opacities are noted throughout both lung fields. These findings are compatible with Covid-19 pneumonia.\par \par 3. Status post sternotomy with surgical clips are noted in the anterior mediastinum. There is a 4.5 x 5.5 x 5 cm mass in the right side of the superior mediastinum with displacement of the trachea to the left. This finding should be correlated with the patient's previous surgery.\par \par 4. 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

## 2020-12-30 NOTE — HISTORY OF PRESENT ILLNESS
[FreeTextEntry1] : 61 y/o M, former smoker, PMH HTN, COPD and Thyroid Cancer S/P Thyroidectomy/Neck Dissection and Idioine Therapy 2014 Dr. Carmona/No.- found to have a 4.5 x 5.5 x 5 cm mass in the right side of the superior mediastinum with displacement of the trachea to the left on w/u of SOB/COVID-19, recent hospital admission November 2020.  He is an asthmatic on Prednisone for many years, was never intubated, treated with High Flow NC and discharged with Xarelto x 1 month, due to finish course at the end of January.  HEre for discussion of new CT findings and potential re-op.\par \par Former smoker 2 PPD X 30 years, quit 5 years ago\par Works [employer]\par On 10 MG of Prednisone for many years \par      \par His healthcare teams is as follows:\par PMD: Dr. Nagel\par Cardio: Dr. Saha\par ENT: Dr. Grover

## 2020-12-30 NOTE — ASSESSMENT
[FreeTextEntry1] : 61 y/o M, former smoker, PMH HTN, COPD and Thyroid Cancer S/P Thyroidectomy/Neck Dissection and Idioine Therapy 2014 found to have a 4.5 x 5.5 x 5 cm mass in the right side of the superior mediastinum with displacement of the trachea to the left on w/u of SOB, recent hospital admission November 2020 for COVID19.\par \par Still SOB since diagnosis of COVID Pneumonia, CT showed multiple GGO's/consolidation\par Denies any fever, chills, cough/mucous production, body aches or diarrhea since home; no stridor\par + Dysphonia, Hoarseness since 2014 after initial surgery\par Presents with his wife\par No palpable cervical LAD\par \par Plan:\par CXR now to evaluate resolution of COVID pneumonia\par F/U in 1 month after fully recovered for COVID for re-imaging, May consider MRI Pre and Post w/ contrast to evaluate for cystic/fluid level\par WIll discuss in Multimodality Tumor Board\par F/U with PMD for routine medical care \par \par I, Vanessa Macias [facility], am acting as scribe for \par \par Had a long discussion and reviewed past surgery, past CT imaging up to the recent spiral CT.\par Lungs showed active COVID pneumonia, which may influence the degree of mediastinal adenopathy and fluid.\par Discussed by telephone with Dr. Bond, will show in conference\par Dr. Bond advised MRI pre/post contrast to determine if fluid or solid\par Alternatively, we can get CxR today and see if pneumonia has abated, then get new CT or PET/CT\par \par Patient to return in 3 weeks.\par \par Case discussed with Dr. Grover and Dr. Vincent Guevara, regardin options for re-operation.\par \par Will re-evaluate after new imaging is obtained.\par \par -FMR\par I personally performed the services described in the documentation, reviewed the documentation recorded by the scribe in my presence and it accurately and completely records my words and actions.\par

## 2021-01-05 LAB
THYROGLOB AB SERPL-ACNC: <20 IU/ML
THYROGLOB SERPL-MCNC: 114 NG/ML

## 2021-01-19 PROBLEM — J98.59 MEDIASTINAL MASS: Status: ACTIVE | Noted: 2020-12-22

## 2021-01-20 ENCOUNTER — APPOINTMENT (OUTPATIENT)
Dept: CARDIOTHORACIC SURGERY | Facility: CLINIC | Age: 61
End: 2021-01-20
Payer: COMMERCIAL

## 2021-01-20 ENCOUNTER — OUTPATIENT (OUTPATIENT)
Dept: OUTPATIENT SERVICES | Facility: HOSPITAL | Age: 61
LOS: 1 days | Discharge: HOME | End: 2021-01-20
Payer: COMMERCIAL

## 2021-01-20 VITALS
WEIGHT: 260 LBS | OXYGEN SATURATION: 95 % | SYSTOLIC BLOOD PRESSURE: 125 MMHG | HEART RATE: 105 BPM | BODY MASS INDEX: 35.21 KG/M2 | RESPIRATION RATE: 18 BRPM | DIASTOLIC BLOOD PRESSURE: 86 MMHG | TEMPERATURE: 98.4 F | HEIGHT: 72 IN

## 2021-01-20 DIAGNOSIS — J44.9 CHRONIC OBSTRUCTIVE PULMONARY DISEASE, UNSPECIFIED: ICD-10-CM

## 2021-01-20 DIAGNOSIS — E89.0 POSTPROCEDURAL HYPOTHYROIDISM: Chronic | ICD-10-CM

## 2021-01-20 DIAGNOSIS — J98.59 OTHER DISEASES OF MEDIASTINUM, NOT ELSEWHERE CLASSIFIED: ICD-10-CM

## 2021-01-20 PROCEDURE — 71046 X-RAY EXAM CHEST 2 VIEWS: CPT | Mod: 26

## 2021-01-20 PROCEDURE — 99213 OFFICE O/P EST LOW 20 MIN: CPT

## 2021-01-20 PROCEDURE — 99072 ADDL SUPL MATRL&STAF TM PHE: CPT

## 2021-01-21 ENCOUNTER — NON-APPOINTMENT (OUTPATIENT)
Age: 61
End: 2021-01-21

## 2021-01-23 NOTE — ASSESSMENT
[FreeTextEntry1] : 61 y/o M, former smoker, PMH HTN, COPD and Thyroid Cancer S/P Thyroidectomy/Neck Dissection and Idioine Therapy 2014 found to have a 4.5 x 5.5 x 5 cm mass in the right side of the superior mediastinum with displacement of the trachea to the left on w/u of SOB, recent hospital admission November 2020 for COVID19.\par \par At prior visit was still SOB since diagnosis of COVID Pneumonia, CT showed multiple GGO's/consolidation, but has been afebrile without any body aches/diarrhea or increase mucous production \par He reports improvement in breathing andf denies fever\par XR today show improvement \par \par Plan:\par CXR today showed- improvement  of COVID lungs\par Will get CT Chest with IV Contrast (Pre and Post) Mid-February\par WIll discuss in Multimodality Tumor Board\par F/U with PMD for routine medical care \par \par I, Vanessa Macias [facility], am acting as scribe for Dr. Carmona\par \par I personally performed the services described in the documentation, reviewed the documentation recorded by the scribe in my presence and it accurately and completely records my words and actions.\par \par Case reviewed at Thoracic Oncology Conference\par Recommendation is to have repeat thyroid scan before CT scan with contrast, to see if thyroid mass uptake is detected.\par \par I will consult with Dr. Dudley of [facility].\par \par -FMR\par \par

## 2021-01-23 NOTE — HISTORY OF PRESENT ILLNESS
[FreeTextEntry1] : 61 y/o M, former smoker, PMH HTN, COPD and Thyroid Cancer S/P Thyroidectomy/Neck Dissection and Idioine Therapy 2014 Dr. Carmona/No.- found to have a 4.5 x 5.5 x 5 cm mass in the right side of the superior mediastinum with displacement of the trachea to the left on w/u of SOB/COVID-19, recent hospital admission November 2020.  He is an asthmatic on Prednisone for many years, was never intubated, treated with High Flow NC and discharged with Xarelto x 1 month, due to finish course at the end of January.  HEre for discussion of new CT findings and potential re-op.  At prior visit, still recovering from COVI-19 pneumonia.  XR 12/30 was stable and showed some improvement.  Here for repeat CXR and surgical discussion.\par \par Former smoker 2 PPD X 30 years, quit 5 years ago\par Works [employer]\par On 10 MG of Prednisone for many years \par      \par His healthcare teams is as follows:\par PMD: Dr. Nagel\par Cardio: Dr. Saha\par ENT: Dr. Grover

## 2021-01-23 NOTE — PHYSICAL EXAM
[Sclera] : the sclera and conjunctiva were normal [PERRL With Normal Accommodation] : pupils were equal in size, round, and reactive to light [Extraocular Movements] : extraocular movements were intact [Strabismus] : no strabismus was seen [Respiration, Rhythm And Depth] : normal respiratory rhythm and effort [Exaggerated Use Of Accessory Muscles For Inspiration] : no accessory muscle use [Apical Impulse] : the apical impulse was normal [Bowel Sounds] : normal bowel sounds [Abdomen Soft] : soft [Abnormal Walk] : normal gait [Nail Clubbing] : no clubbing  or cyanosis of the fingernails [Musculoskeletal - Swelling] : no joint swelling seen [Skin Color & Pigmentation] : normal skin color and pigmentation [Skin Turgor] : normal skin turgor [] : no rash [Cranial Nerves] : cranial nerves 2-12 were intact [Deep Tendon Reflexes (DTR)] : deep tendon reflexes were 2+ and symmetric [Oriented To Time, Place, And Person] : oriented to person, place, and time [Impaired Insight] : insight and judgment were intact [Affect] : the affect was normal [FreeTextEntry1] : Tachycardia

## 2021-02-19 LAB — TSH SERPL-ACNC: 0.05 UIU/ML

## 2021-02-22 LAB
ANION GAP SERPL CALC-SCNC: 11 MMOL/L
BUN SERPL-MCNC: 20 MG/DL
CALCIUM SERPL-MCNC: 9.2 MG/DL
CHLORIDE SERPL-SCNC: 96 MMOL/L
CO2 SERPL-SCNC: 30 MMOL/L
CREAT SERPL-MCNC: 0.9 MG/DL
GLUCOSE SERPL-MCNC: 90 MG/DL
POTASSIUM SERPL-SCNC: 3.4 MMOL/L
SODIUM SERPL-SCNC: 137 MMOL/L

## 2021-02-23 ENCOUNTER — NON-APPOINTMENT (OUTPATIENT)
Age: 61
End: 2021-02-23

## 2021-03-20 ENCOUNTER — OUTPATIENT (OUTPATIENT)
Dept: OUTPATIENT SERVICES | Facility: HOSPITAL | Age: 61
LOS: 1 days | Discharge: HOME | End: 2021-03-20

## 2021-03-20 DIAGNOSIS — Z11.59 ENCOUNTER FOR SCREENING FOR OTHER VIRAL DISEASES: ICD-10-CM

## 2021-03-20 DIAGNOSIS — E89.0 POSTPROCEDURAL HYPOTHYROIDISM: Chronic | ICD-10-CM

## 2021-03-22 ENCOUNTER — OUTPATIENT (OUTPATIENT)
Dept: OUTPATIENT SERVICES | Facility: HOSPITAL | Age: 61
LOS: 1 days | Discharge: HOME | End: 2021-03-22
Payer: COMMERCIAL

## 2021-03-22 DIAGNOSIS — E07.9 DISORDER OF THYROID, UNSPECIFIED: ICD-10-CM

## 2021-03-22 DIAGNOSIS — E89.0 POSTPROCEDURAL HYPOTHYROIDISM: Chronic | ICD-10-CM

## 2021-03-23 ENCOUNTER — RESULT REVIEW (OUTPATIENT)
Age: 61
End: 2021-03-23

## 2021-03-23 PROCEDURE — 78018 THYROID MET IMAGING BODY: CPT | Mod: 26

## 2021-03-23 PROCEDURE — 78020 THYROID MET UPTAKE: CPT | Mod: 26

## 2021-03-24 ENCOUNTER — RESULT REVIEW (OUTPATIENT)
Age: 61
End: 2021-03-24

## 2021-03-24 PROCEDURE — 79005 NUCLEAR RX ORAL ADMIN: CPT | Mod: 26

## 2021-03-31 ENCOUNTER — RESULT REVIEW (OUTPATIENT)
Age: 61
End: 2021-03-31

## 2021-03-31 PROCEDURE — 78018 THYROID MET IMAGING BODY: CPT | Mod: 26

## 2021-09-12 NOTE — ED ADULT NURSE NOTE - SUICIDE SCREENING QUESTION 1
Pt resting supine in [facility]. Dinner tray given, pt updated on POC, all needs met at this time.   No

## 2021-11-05 NOTE — ED PROVIDER NOTE - PMH
Borderline diabetes    COPD (chronic obstructive pulmonary disease)    HTN (hypertension)    Thyroid cancer     Skin Substitute Text: The defect edges were debeveled with a #15 scalpel blade.  Given the location of the defect, shape of the defect and the proximity to free margins a skin substitute graft was deemed most appropriate.  The graft material was trimmed to fit the size of the defect. The graft was then placed in the primary defect and oriented appropriately.

## 2022-06-29 NOTE — PATIENT PROFILE ADULT - BRADEN MOBILITY
1. Primary osteoarthritis of right knee      -Patient is following up for right knee pain and swelling due to arthritis  -Patient tolerated cortisone injection today without complications.  Patient given postprocedure instruction  -Patient only reports 2 months of relief from her last cortisone injection.  We had a discussion to try viscosupplementation treatment since patient is only getting 2 months of relief from cortisone injections.  -Prior authorization for Synvisc 1 was ordered today.  -Patient will follow up in 4 to 6 weeks to administer the Synvisc 1 injection.  -Call direct clinic number [[phone]] at any time with questions or concerns.    Albert Yeo MD [facility]  [facility] [place]        
(4) no limitation

## 2023-03-02 NOTE — REASON FOR VISIT
[Initial Evaluation] : an initial evaluation for [FreeTextEntry2] : mediastinum mass  0 = independent

## 2023-03-24 NOTE — ED ADULT NURSE NOTE - NSSUHOSCREENINGYN_ED_ALL_ED
Patient presents for periodic surveillance of chronic medical problems.     Subjective  Melecio Rivera is a 75 y.o. male who presents for Annual Exam.  HPI  COPD, recommend updating pfts, oxygen testing and referral to pulm, declines  HTN, stable, needs abs  Carotid stenosis, thoracic aneurysm,sees vascular  Pacemaker status  Colon polyp, [id number], recommend colonoscopy, declines  Smokes, warned of risk due to reassess  Low b12, due to reassess  Depression- stable  Hyperlipidemia, stable  Had an episode awhile back had a cold, coughed so hard he passed out.  Wants no workup.  States went to the [employer] and had three drinks and that seemed to help his cough more than anything he tried.  Review of Systems   All other systems reviewed and are negative.  .    Objective     Visit Vitals  /66 (BP Location: Left arm, Patient Position: Sitting)   Pulse 64      Physical Exam  Vitals and nursing note reviewed.   Constitutional:       General: He is not in acute distress.     Appearance: Normal appearance. He is not toxic-appearing.      Comments: Thad complexion   HENT:      Head: Normocephalic and atraumatic.      Right Ear: External ear normal.   Cardiovascular:      Rate and Rhythm: Normal rate and regular rhythm.      Heart sounds: No murmur heard.  Pulmonary:      Effort: Pulmonary effort is normal.      Breath sounds: Normal breath sounds.      Comments: Prolonged expiration  Musculoskeletal:      Cervical back: Neck supple. No rigidity.      Comments: Normal gait   Skin:     General: Skin is warm and dry.   Neurological:      General: No focal deficit present.      Mental Status: He is alert and oriented to person, place, and time.   Psychiatric:         Mood and Affect: Mood normal.         Behavior: Behavior normal.         Assessment/Plan   Problem List Items Addressed This Visit          Respiratory    Emphysema/COPD (CMS/HCC)       Circulatory    Benign hypertension    Relevant Orders    Follow Up In  Primary Care    Carotid stenosis    Pacemaker, artificial    Thoracic aortic aneurysm without rupture       Digestive    Tubular adenoma of colon       Hematologic    Low vitamin B12 level    Relevant Orders    Vitamin B12       Other    Endogenous depression (CMS/HCC)    Hyperlipidemia    Relevant Orders    CBC and Auto Differential    Comprehensive Metabolic Panel    Lipid Panel    TSH with reflex to Free T4 if abnormal    Urinalysis Microscopic Only    Albumin , Urine Random    Vitamin B12    Smoker     Other Visit Diagnoses       Routine general medical examination at health care facility    -  Primary                   Flory Hopson MD    Yes - the patient is able to be screened

## 2023-04-20 ENCOUNTER — EMERGENCY (EMERGENCY)
Facility: HOSPITAL | Age: 63
LOS: 0 days | Discharge: LEFT BEFORE DISCHARGE | End: 2023-04-20
Attending: EMERGENCY MEDICINE
Payer: COMMERCIAL

## 2023-04-20 VITALS
HEIGHT: 70 IN | WEIGHT: 261.91 LBS | TEMPERATURE: 96 F | RESPIRATION RATE: 17 BRPM | SYSTOLIC BLOOD PRESSURE: 116 MMHG | OXYGEN SATURATION: 96 % | DIASTOLIC BLOOD PRESSURE: 72 MMHG | HEART RATE: 78 BPM

## 2023-04-20 VITALS
RESPIRATION RATE: 20 BRPM | TEMPERATURE: 96 F | HEART RATE: 100 BPM | SYSTOLIC BLOOD PRESSURE: 128 MMHG | DIASTOLIC BLOOD PRESSURE: 74 MMHG | OXYGEN SATURATION: 97 %

## 2023-04-20 DIAGNOSIS — R07.81 PLEURODYNIA: ICD-10-CM

## 2023-04-20 DIAGNOSIS — D72.825 BANDEMIA: ICD-10-CM

## 2023-04-20 DIAGNOSIS — I10 ESSENTIAL (PRIMARY) HYPERTENSION: ICD-10-CM

## 2023-04-20 DIAGNOSIS — M54.9 DORSALGIA, UNSPECIFIED: ICD-10-CM

## 2023-04-20 DIAGNOSIS — E89.0 POSTPROCEDURAL HYPOTHYROIDISM: Chronic | ICD-10-CM

## 2023-04-20 DIAGNOSIS — E89.0 POSTPROCEDURAL HYPOTHYROIDISM: ICD-10-CM

## 2023-04-20 DIAGNOSIS — Z87.09 PERSONAL HISTORY OF OTHER DISEASES OF THE RESPIRATORY SYSTEM: ICD-10-CM

## 2023-04-20 DIAGNOSIS — J44.9 CHRONIC OBSTRUCTIVE PULMONARY DISEASE, UNSPECIFIED: ICD-10-CM

## 2023-04-20 DIAGNOSIS — Z85.850 PERSONAL HISTORY OF MALIGNANT NEOPLASM OF THYROID: ICD-10-CM

## 2023-04-20 DIAGNOSIS — Z53.29 PROCEDURE AND TREATMENT NOT CARRIED OUT BECAUSE OF PATIENT'S DECISION FOR OTHER REASONS: ICD-10-CM

## 2023-04-20 DIAGNOSIS — E11.9 TYPE 2 DIABETES MELLITUS WITHOUT COMPLICATIONS: ICD-10-CM

## 2023-04-20 DIAGNOSIS — Z79.01 LONG TERM (CURRENT) USE OF ANTICOAGULANTS: ICD-10-CM

## 2023-04-20 DIAGNOSIS — Z20.822 CONTACT WITH AND (SUSPECTED) EXPOSURE TO COVID-19: ICD-10-CM

## 2023-04-20 LAB
ALBUMIN SERPL ELPH-MCNC: 3.3 G/DL — LOW (ref 3.5–5.2)
ALP SERPL-CCNC: 114 U/L — SIGNIFICANT CHANGE UP (ref 30–115)
ALT FLD-CCNC: 7 U/L — SIGNIFICANT CHANGE UP (ref 0–41)
ANION GAP SERPL CALC-SCNC: 12 MMOL/L — SIGNIFICANT CHANGE UP (ref 7–14)
AST SERPL-CCNC: 11 U/L — SIGNIFICANT CHANGE UP (ref 0–41)
BASOPHILS # BLD AUTO: 0.13 K/UL — SIGNIFICANT CHANGE UP (ref 0–0.2)
BASOPHILS NFR BLD AUTO: 0.3 % — SIGNIFICANT CHANGE UP (ref 0–1)
BILIRUB SERPL-MCNC: <0.2 MG/DL — SIGNIFICANT CHANGE UP (ref 0.2–1.2)
BUN SERPL-MCNC: 22 MG/DL — HIGH (ref 10–20)
CALCIUM SERPL-MCNC: 9.4 MG/DL — SIGNIFICANT CHANGE UP (ref 8.4–10.5)
CHLORIDE SERPL-SCNC: 101 MMOL/L — SIGNIFICANT CHANGE UP (ref 98–110)
CO2 SERPL-SCNC: 24 MMOL/L — SIGNIFICANT CHANGE UP (ref 17–32)
CREAT SERPL-MCNC: 1.1 MG/DL — SIGNIFICANT CHANGE UP (ref 0.7–1.5)
EGFR: 76 ML/MIN/1.73M2 — SIGNIFICANT CHANGE UP
EOSINOPHIL # BLD AUTO: 1.16 K/UL — HIGH (ref 0–0.7)
EOSINOPHIL NFR BLD AUTO: 3.1 % — SIGNIFICANT CHANGE UP (ref 0–8)
FLUAV AG NPH QL: SIGNIFICANT CHANGE UP
FLUBV AG NPH QL: SIGNIFICANT CHANGE UP
GLUCOSE SERPL-MCNC: 116 MG/DL — HIGH (ref 70–99)
HCT VFR BLD CALC: 37.5 % — LOW (ref 42–52)
HGB BLD-MCNC: 11.6 G/DL — LOW (ref 14–18)
IMM GRANULOCYTES NFR BLD AUTO: 2.3 % — HIGH (ref 0.1–0.3)
LYMPHOCYTES # BLD AUTO: 1.52 K/UL — SIGNIFICANT CHANGE UP (ref 1.2–3.4)
LYMPHOCYTES # BLD AUTO: 4 % — LOW (ref 20.5–51.1)
MAGNESIUM SERPL-MCNC: 1.9 MG/DL — SIGNIFICANT CHANGE UP (ref 1.8–2.4)
MCHC RBC-ENTMCNC: 25.6 PG — LOW (ref 27–31)
MCHC RBC-ENTMCNC: 30.9 G/DL — LOW (ref 32–37)
MCV RBC AUTO: 82.8 FL — SIGNIFICANT CHANGE UP (ref 80–94)
MONOCYTES # BLD AUTO: 2.99 K/UL — HIGH (ref 0.1–0.6)
MONOCYTES NFR BLD AUTO: 7.9 % — SIGNIFICANT CHANGE UP (ref 1.7–9.3)
NEUTROPHILS # BLD AUTO: 31.06 K/UL — HIGH (ref 1.4–6.5)
NEUTROPHILS NFR BLD AUTO: 82.4 % — HIGH (ref 42.2–75.2)
NEUTS BAND # BLD: 10 % — HIGH (ref 0–6)
NRBC # BLD: 0 /100 WBCS — SIGNIFICANT CHANGE UP (ref 0–0)
NRBC # BLD: 0 /100 — SIGNIFICANT CHANGE UP (ref 0–0)
NT-PROBNP SERPL-SCNC: 3393 PG/ML — HIGH (ref 0–300)
PLAT MORPH BLD: NORMAL — SIGNIFICANT CHANGE UP
PLATELET # BLD AUTO: 348 K/UL — SIGNIFICANT CHANGE UP (ref 130–400)
PMV BLD: 10.5 FL — HIGH (ref 7.4–10.4)
POTASSIUM SERPL-MCNC: 5.1 MMOL/L — HIGH (ref 3.5–5)
POTASSIUM SERPL-SCNC: 5.1 MMOL/L — HIGH (ref 3.5–5)
PROT SERPL-MCNC: 6.2 G/DL — SIGNIFICANT CHANGE UP (ref 6–8)
RBC # BLD: 4.53 M/UL — LOW (ref 4.7–6.1)
RBC # FLD: 17.3 % — HIGH (ref 11.5–14.5)
RBC BLD AUTO: NORMAL — SIGNIFICANT CHANGE UP
RSV RNA NPH QL NAA+NON-PROBE: SIGNIFICANT CHANGE UP
SARS-COV-2 RNA SPEC QL NAA+PROBE: SIGNIFICANT CHANGE UP
SODIUM SERPL-SCNC: 137 MMOL/L — SIGNIFICANT CHANGE UP (ref 135–146)
TROPONIN T SERPL-MCNC: <0.01 NG/ML — SIGNIFICANT CHANGE UP
WBC # BLD: 37.72 K/UL — HIGH (ref 4.8–10.8)
WBC # FLD AUTO: 37.72 K/UL — HIGH (ref 4.8–10.8)

## 2023-04-20 PROCEDURE — 0241U: CPT

## 2023-04-20 PROCEDURE — 71275 CT ANGIOGRAPHY CHEST: CPT | Mod: 26,MA

## 2023-04-20 PROCEDURE — 71045 X-RAY EXAM CHEST 1 VIEW: CPT | Mod: 26

## 2023-04-20 PROCEDURE — 99285 EMERGENCY DEPT VISIT HI MDM: CPT | Mod: 25

## 2023-04-20 PROCEDURE — 36415 COLL VENOUS BLD VENIPUNCTURE: CPT

## 2023-04-20 PROCEDURE — 84484 ASSAY OF TROPONIN QUANT: CPT

## 2023-04-20 PROCEDURE — 96375 TX/PRO/DX INJ NEW DRUG ADDON: CPT

## 2023-04-20 PROCEDURE — 71275 CT ANGIOGRAPHY CHEST: CPT | Mod: MA

## 2023-04-20 PROCEDURE — 93005 ELECTROCARDIOGRAM TRACING: CPT

## 2023-04-20 PROCEDURE — 71045 X-RAY EXAM CHEST 1 VIEW: CPT

## 2023-04-20 PROCEDURE — 85025 COMPLETE CBC W/AUTO DIFF WBC: CPT

## 2023-04-20 PROCEDURE — 99285 EMERGENCY DEPT VISIT HI MDM: CPT

## 2023-04-20 PROCEDURE — 93010 ELECTROCARDIOGRAM REPORT: CPT

## 2023-04-20 PROCEDURE — 83880 ASSAY OF NATRIURETIC PEPTIDE: CPT

## 2023-04-20 PROCEDURE — 80053 COMPREHEN METABOLIC PANEL: CPT

## 2023-04-20 PROCEDURE — 87040 BLOOD CULTURE FOR BACTERIA: CPT

## 2023-04-20 PROCEDURE — 83735 ASSAY OF MAGNESIUM: CPT

## 2023-04-20 PROCEDURE — 96374 THER/PROPH/DIAG INJ IV PUSH: CPT

## 2023-04-20 RX ORDER — VANCOMYCIN HCL 1 G
1000 VIAL (EA) INTRAVENOUS ONCE
Refills: 0 | Status: COMPLETED | OUTPATIENT
Start: 2023-04-20 | End: 2023-04-20

## 2023-04-20 RX ORDER — LEVOFLOXACIN 5 MG/ML
1 INJECTION, SOLUTION INTRAVENOUS
Qty: 7 | Refills: 0
Start: 2023-04-20 | End: 2023-04-26

## 2023-04-20 RX ORDER — CEFEPIME 1 G/1
2000 INJECTION, POWDER, FOR SOLUTION INTRAMUSCULAR; INTRAVENOUS ONCE
Refills: 0 | Status: COMPLETED | OUTPATIENT
Start: 2023-04-20 | End: 2023-04-20

## 2023-04-20 RX ORDER — HYDROMORPHONE HYDROCHLORIDE 2 MG/ML
0.5 INJECTION INTRAMUSCULAR; INTRAVENOUS; SUBCUTANEOUS ONCE
Refills: 0 | Status: DISCONTINUED | OUTPATIENT
Start: 2023-04-20 | End: 2023-04-20

## 2023-04-20 RX ADMIN — Medication 250 MILLIGRAM(S): at 03:15

## 2023-04-20 RX ADMIN — CEFEPIME 100 MILLIGRAM(S): 1 INJECTION, POWDER, FOR SOLUTION INTRAMUSCULAR; INTRAVENOUS at 03:15

## 2023-04-20 RX ADMIN — HYDROMORPHONE HYDROCHLORIDE 0.5 MILLIGRAM(S): 2 INJECTION INTRAMUSCULAR; INTRAVENOUS; SUBCUTANEOUS at 04:08

## 2023-04-20 NOTE — ED PROVIDER NOTE - CLINICAL SUMMARY MEDICAL DECISION MAKING FREE TEXT BOX
64 yo M with metastatic thyroid ca to mediastinum, phrenic nerve palsy, COPD, HTN,  DMII here for assessment of R sided rib and back pain worsening over the last 24 hours. No fever, chills, cough, dyspnea.     VS normal. Patient in no distress but is in pain, has decreased BS on R, crackles at L base.     Labs notable for WBC of 37 with bandemia -- new from previous here and also per patient -- broad spectrum abx given.    Given rib pain, malignancy, abnormal CXR -- CTA of the chest was done -- chronic findings noted now with small L pleural and pericardial effusion.    Plan was for admission for IV abx however the patient has been following at [facility] and prefers to go there, leaving here AMA.    He and daughter counseled that labs are concerning for sepsis and no clear source has been identified, despite IV and now PO abx with AMA, still is at risk for sepsis, shock, death.    They are aware of these things and state they will go to [facility] today.

## 2023-04-20 NOTE — ED ADULT NURSE NOTE - TEMPLATE LIST FOR HEAD TO TOE ASSESSMENT
Heart Palpitations in Adolescents   WHAT YOU NEED TO KNOW:   Heart palpitations are feelings that your heart races, jumps, throbs, or flutters  You may feel extra beats, no beats for a short time, or skipped beats  You may have these feelings in your chest, throat, or neck  They may happen when you are sitting, standing, or lying  Heart palpitations may be frightening, but are usually not caused by a serious problem  DISCHARGE INSTRUCTIONS:   Call 911 or have someone else call for any of the following:   · You have squeezing, pressure, or pain in your chest      · You feel short of breath or have trouble breathing  · You faint or lose consciousness  Return to the emergency department if:   · Your palpitations happen more often or get more intense  Contact your healthcare provider if:   · You have new or worsening swelling in your feet or ankles  · You have questions or concerns about your condition or care  Follow up with your healthcare provider as directed: You may need to follow up with a cardiologist  Chay Hooks may need more tests to check for heart problems that cause palpitations  Write down your questions so you remember to ask them during your visits  Keep a record:  Write down when your palpitations start and stop, what you were doing when they started, and your symptoms  Keep track of what you ate or drank within a few hours of your palpitations  Include anything that seemed to help your symptoms, such as lying down or holding your breath  This record will help you and your healthcare provider learn what triggers your palpitations  Bring this record with you to your follow up visits  Help prevent heart palpitations:   · Manage stress and anxiety  Find ways to relax such as listening to music, meditating, exercising, or doing yoga  Talk to someone you trust about your stress or anxiety  You can also talk to a school counselor or a therapist      · Get plenty of sleep every night    Ask your healthcare provider how much sleep you need each night  · Do not drink caffeine or alcohol  Caffeine and alcohol can make your palpitations worse  Caffeine is found in soda, coffee, tea, chocolate, and drinks that increase your energy  · Do not smoke  Nicotine and other chemicals in cigarettes and cigars may damage your heart and blood vessels  Ask your healthcare provider for information if you currently smoke and need help to quit  E-cigarettes or smokeless tobacco still contain nicotine  Talk to your healthcare provider before you use these products  · Do not use illegal drugs  Talk to your healthcare provider if you use illegal drugs and want help to quit  © 2017 [address] Information is for End User's use only and may not be sold, redistributed or otherwise used for commercial purposes  All illustrations and images included in CareNotes® are the copyrighted property of [employer]  or [employer] G Fin[employer]  The above information is an  only  It is not intended as medical advice for individual conditions or treatments  Talk to your doctor, nurse or pharmacist before following any medical regimen to see if it is safe and effective for you  General

## 2023-04-20 NOTE — ED PROVIDER NOTE - OBJECTIVE STATEMENT
63 year old male past medical history of past medical history of Thryoid cancer with mets, parlayed phernic nerve and diaphragm, COPD, Hypertension, Diabetes, on BIPAP at night comes to emergency room for right rib and back pain getting worse over the last day. no fever/chills. no chest pain no new shortness of breath. no abd pain.

## 2023-04-20 NOTE — ED PROVIDER NOTE - PATIENT PORTAL LINK FT
You can access the FollowMyHealth Patient Portal offered by [facility] by registering at the following website: [email or web address]. By joining [facility]’s FollowMyHealth portal, you will also be able to view your health information using other applications (apps) compatible with our system.

## 2023-04-20 NOTE — ED PROVIDER NOTE - CARE PLAN
Principal Discharge DX:	Rib pain  Secondary Diagnosis:	Back pain  Secondary Diagnosis:	Leukocytosis  Secondary Diagnosis:	Bandemia   1

## 2023-04-20 NOTE — ED PROVIDER NOTE - NS ED ATTENDING STATEMENT MOD
This was a shared visit with the LOENA. I reviewed and verified the documentation and independently performed the documented:

## 2023-04-20 NOTE — ED PROVIDER NOTE - PHYSICAL EXAMINATION
Physical Exam    Vital Signs: I have reviewed the initial vital signs.  Constitutional: no acute distress  Eyes: Conjunctiva pink, Sclera clear, PERRLA, EOMI.  Cardiovascular: S1 and S2, regular rate, regular rhythm, well-perfused extremities, radial pulses equal and 2+  Respiratory: unlabored respiratory effort, clear to auscultation bilaterally no wheezing, rales and rhonchi  Gastrointestinal: soft, non-tender abdomen, no pulsatile mass, normal bowl sounds  Musculoskeletal: supple neck, no lower extremity edema, no midline tenderness  Integumentary: warm, dry, no rash  Neurologic: awake, alert, cranial nerves II-XII grossly intact, extremities’ motor and sensory functions grossly intact  Psychiatric: appropriate mood, appropriate affect

## 2023-04-20 NOTE — ED PROVIDER NOTE - NSFOLLOWUPINSTRUCTIONS_ED_ALL_ED_FT
Follow up with your doctors in [facility] Today.    Back Pain    WHAT YOU NEED TO KNOW:    Back pain is common. It can be caused by many conditions, such as arthritis or the breakdown of spinal discs. Your risk for back pain is increased by injuries, lack of activity, or repeated bending and twisting. You may feel sore or stiff on one or both sides of your back. The pain may spread to your buttocks or thighs.    DISCHARGE INSTRUCTIONS:    Return to the emergency department if:     You have pain, numbness, or weakness in one or both legs.      Your pain becomes so severe that you cannot walk.      You cannot control your urine or bowel movements.      You have severe back pain with chest pain.      You have severe back pain, nausea, and vomiting.      You have severe back pain that spreads to your side or genital area.    Contact your healthcare provider if:     You have back pain that does not get better with rest and pain medicine.      You have a fever.      You have pain that worsens when you are on your back or when you rest.      You have pain that worsens when you cough or sneeze.      You lose weight without trying.      You have questions or concerns about your condition or care.    Medicines:     NSAIDs help decrease swelling and pain. This medicine is available with or without a doctor's order. NSAIDs can cause stomach bleeding or kidney problems in certain people. If you take blood thinner medicine, always ask your healthcare provider if NSAIDs are safe for you. Always read the medicine label and follow directions.      Acetaminophen decreases pain and fever. It is available without a doctor's order. Ask how much to take and how often to take it. Follow directions. Read the labels of all other medicines you are using to see if they also contain acetaminophen, or ask your doctor or pharmacist. Acetaminophen can cause liver damage if not taken correctly. Do not use more than 4 grams (4,000 milligrams) total of acetaminophen in one day.       Muscle relaxers help decrease muscle spasms and back pain.      Prescription pain medicine may be given. Ask your healthcare provider how to take this medicine safely. Some prescription pain medicines contain acetaminophen. Do not take other medicines that contain acetaminophen without talking to your healthcare provider. Too much acetaminophen may cause liver damage. Prescription pain medicine may cause constipation. Ask your healthcare provider how to prevent or treat constipation.       Take your medicine as directed. Contact your healthcare provider if you think your medicine is not helping or if you have side effects. Tell him or her if you are allergic to any medicine. Keep a list of the medicines, vitamins, and herbs you take. Include the amounts, and when and why you take them. Bring the list or the pill bottles to follow-up visits. Carry your medicine list with you in case of an emergency.    How to manage your back pain:     Apply ice on your back for 15 to 20 minutes every hour or as directed. Use an ice pack, or put crushed ice in a plastic bag. Cover it with a towel before you apply it to your skin. Ice helps prevent tissue damage and decreases pain.      Apply heat on your back for 20 to 30 minutes every 2 hours for as many days as directed. Heat helps decrease pain and muscle spasms.      Stay active as much as you can without causing more pain. Bed rest could make your back pain worse. Avoid heavy lifting until your pain is gone.      Go to physical therapy as directed. A physical therapist can teach you exercises to help improve movement and strength, and to decrease pain.    Follow up with your healthcare provider in 2 weeks, or as directed: Write down your questions so you remember to ask them during your visits.       © Copyright [employer] 2019 All illustrations and images included in CareNotes are the copyrighted property of [employer]. or .        Leukocytosis    WHAT YOU NEED TO KNOW:    Leukocytosis is a condition that causes you to have too many white blood cells (WBC). WBCs are part of your immune system and help fight infections and diseases.     DISCHARGE INSTRUCTIONS:    Medicines:     Medicines may be given to decrease inflammation or treat an infection. You may also be given medicine to decrease acid levels in your body or urine.      Take your medicine as directed. Contact your healthcare provider if you think your medicine is not helping or if you have side effects. Tell him of her if you are allergic to any medicine. Keep a list of the medicines, vitamins, and herbs you take. Include the amounts, and when and why you take them. Bring the list or the pill bottles to follow-up visits. Carry your medicine list with you in case of an emergency.    Follow up with your healthcare provider as directed: You may need more tests or treatment. You may also be referred to a specialist. Write down your questions so you remember to ask them during your visits.    Do not smoke: If you smoke, it is never too late to quit. Ask for information about how to stop smoking if you need help.    Contact your healthcare provider or specialist if:     You have a fever.       You bruise or bleed easily.       You are nauseated, lose weight without trying, or have a poor appetite.       You feel weak, tired, or sick.       You are a man and you have a painful erection that lasts longer than usual.       You have questions or concerns about your condition or care.     Return to the emergency department if:     You have any of the following signs of a stroke:   Part of your face droops or is numb      Weakness in an arm or leg      Confusion or difficulty speaking      Dizziness, a severe headache, or vision loss      You have chest pain or trouble breathing.      You have bleeding that does not stop.      You have new pain or tingling in your arms, legs, or abdomen.      You have sudden back pain.         © Copyright [employer] 2019 All illustrations and images included in CareNotes are the copyrighted property of [employer] or .

## 2023-04-25 LAB
CULTURE RESULTS: SIGNIFICANT CHANGE UP
CULTURE RESULTS: SIGNIFICANT CHANGE UP
SPECIMEN SOURCE: SIGNIFICANT CHANGE UP
SPECIMEN SOURCE: SIGNIFICANT CHANGE UP

## 2023-05-27 ENCOUNTER — INPATIENT (INPATIENT)
Facility: HOSPITAL | Age: 63
LOS: 6 days | Discharge: HOME HEALTH | DRG: 813 | End: 2023-06-03
Attending: HOSPITALIST | Admitting: HOSPITALIST
Payer: MEDICARE

## 2023-05-27 VITALS
TEMPERATURE: 99 F | DIASTOLIC BLOOD PRESSURE: 51 MMHG | HEART RATE: 62 BPM | OXYGEN SATURATION: 97 % | SYSTOLIC BLOOD PRESSURE: 107 MMHG | RESPIRATION RATE: 20 BRPM | HEIGHT: 70 IN | WEIGHT: 240.08 LBS

## 2023-05-27 DIAGNOSIS — E89.0 POSTPROCEDURAL HYPOTHYROIDISM: Chronic | ICD-10-CM

## 2023-05-27 DIAGNOSIS — T14.8XXA OTHER INJURY OF UNSPECIFIED BODY REGION, INITIAL ENCOUNTER: ICD-10-CM

## 2023-05-27 LAB
ALBUMIN SERPL ELPH-MCNC: 3.1 G/DL — LOW (ref 3.5–5.2)
ALP SERPL-CCNC: 189 U/L — HIGH (ref 30–115)
ALT FLD-CCNC: 21 U/L — SIGNIFICANT CHANGE UP (ref 0–41)
ANION GAP SERPL CALC-SCNC: 9 MMOL/L — SIGNIFICANT CHANGE UP (ref 7–14)
APTT BLD: 30.8 SEC — SIGNIFICANT CHANGE UP (ref 27–39.2)
AST SERPL-CCNC: 25 U/L — SIGNIFICANT CHANGE UP (ref 0–41)
BASOPHILS # BLD AUTO: 0.03 K/UL — SIGNIFICANT CHANGE UP (ref 0–0.2)
BASOPHILS NFR BLD AUTO: 0.3 % — SIGNIFICANT CHANGE UP (ref 0–1)
BILIRUB SERPL-MCNC: 0.4 MG/DL — SIGNIFICANT CHANGE UP (ref 0.2–1.2)
BUN SERPL-MCNC: 16 MG/DL — SIGNIFICANT CHANGE UP (ref 10–20)
CALCIUM SERPL-MCNC: 8.7 MG/DL — SIGNIFICANT CHANGE UP (ref 8.4–10.5)
CHLORIDE SERPL-SCNC: 98 MMOL/L — SIGNIFICANT CHANGE UP (ref 98–110)
CO2 SERPL-SCNC: 24 MMOL/L — SIGNIFICANT CHANGE UP (ref 17–32)
CREAT SERPL-MCNC: 0.7 MG/DL — SIGNIFICANT CHANGE UP (ref 0.7–1.5)
EGFR: 104 ML/MIN/1.73M2 — SIGNIFICANT CHANGE UP
EOSINOPHIL # BLD AUTO: 0.21 K/UL — SIGNIFICANT CHANGE UP (ref 0–0.7)
EOSINOPHIL NFR BLD AUTO: 2.2 % — SIGNIFICANT CHANGE UP (ref 0–8)
GLUCOSE SERPL-MCNC: 120 MG/DL — HIGH (ref 70–99)
HCT VFR BLD CALC: 23.5 % — LOW (ref 42–52)
HGB BLD-MCNC: 7.5 G/DL — LOW (ref 14–18)
IMM GRANULOCYTES NFR BLD AUTO: 1.1 % — HIGH (ref 0.1–0.3)
INR BLD: 1.05 RATIO — SIGNIFICANT CHANGE UP (ref 0.65–1.3)
LACTATE SERPL-SCNC: 1.6 MMOL/L — SIGNIFICANT CHANGE UP (ref 0.7–2)
LYMPHOCYTES # BLD AUTO: 1.05 K/UL — LOW (ref 1.2–3.4)
LYMPHOCYTES # BLD AUTO: 11.1 % — LOW (ref 20.5–51.1)
MCHC RBC-ENTMCNC: 26.6 PG — LOW (ref 27–31)
MCHC RBC-ENTMCNC: 31.9 G/DL — LOW (ref 32–37)
MCV RBC AUTO: 83.3 FL — SIGNIFICANT CHANGE UP (ref 80–94)
MONOCYTES # BLD AUTO: 0.88 K/UL — HIGH (ref 0.1–0.6)
MONOCYTES NFR BLD AUTO: 9.3 % — SIGNIFICANT CHANGE UP (ref 1.7–9.3)
NEUTROPHILS # BLD AUTO: 7.21 K/UL — HIGH (ref 1.4–6.5)
NEUTROPHILS NFR BLD AUTO: 76 % — HIGH (ref 42.2–75.2)
NRBC # BLD: 0 /100 WBCS — SIGNIFICANT CHANGE UP (ref 0–0)
PLATELET # BLD AUTO: 205 K/UL — SIGNIFICANT CHANGE UP (ref 130–400)
PMV BLD: 10.5 FL — HIGH (ref 7.4–10.4)
POTASSIUM SERPL-MCNC: 5.4 MMOL/L — HIGH (ref 3.5–5)
POTASSIUM SERPL-SCNC: 5.4 MMOL/L — HIGH (ref 3.5–5)
PROT SERPL-MCNC: 5.4 G/DL — LOW (ref 6–8)
PROTHROM AB SERPL-ACNC: 12 SEC — SIGNIFICANT CHANGE UP (ref 9.95–12.87)
RBC # BLD: 2.82 M/UL — LOW (ref 4.7–6.1)
RBC # FLD: 19.8 % — HIGH (ref 11.5–14.5)
SODIUM SERPL-SCNC: 131 MMOL/L — LOW (ref 135–146)
WBC # BLD: 9.48 K/UL — SIGNIFICANT CHANGE UP (ref 4.8–10.8)
WBC # FLD AUTO: 9.48 K/UL — SIGNIFICANT CHANGE UP (ref 4.8–10.8)

## 2023-05-27 PROCEDURE — 84466 ASSAY OF TRANSFERRIN: CPT

## 2023-05-27 PROCEDURE — 72170 X-RAY EXAM OF PELVIS: CPT | Mod: 26

## 2023-05-27 PROCEDURE — 36430 TRANSFUSION BLD/BLD COMPNT: CPT

## 2023-05-27 PROCEDURE — 81003 URINALYSIS AUTO W/O SCOPE: CPT

## 2023-05-27 PROCEDURE — 71045 X-RAY EXAM CHEST 1 VIEW: CPT

## 2023-05-27 PROCEDURE — 82784 ASSAY IGA/IGD/IGG/IGM EACH: CPT

## 2023-05-27 PROCEDURE — 82728 ASSAY OF FERRITIN: CPT

## 2023-05-27 PROCEDURE — 0225U NFCT DS DNA&RNA 21 SARSCOV2: CPT

## 2023-05-27 PROCEDURE — 80048 BASIC METABOLIC PNL TOTAL CA: CPT

## 2023-05-27 PROCEDURE — 94640 AIRWAY INHALATION TREATMENT: CPT

## 2023-05-27 PROCEDURE — 86663 EPSTEIN-BARR ANTIBODY: CPT

## 2023-05-27 PROCEDURE — 86900 BLOOD TYPING SEROLOGIC ABO: CPT

## 2023-05-27 PROCEDURE — 80076 HEPATIC FUNCTION PANEL: CPT

## 2023-05-27 PROCEDURE — 86706 HEP B SURFACE ANTIBODY: CPT

## 2023-05-27 PROCEDURE — 82962 GLUCOSE BLOOD TEST: CPT

## 2023-05-27 PROCEDURE — 80053 COMPREHEN METABOLIC PANEL: CPT

## 2023-05-27 PROCEDURE — 80074 ACUTE HEPATITIS PANEL: CPT

## 2023-05-27 PROCEDURE — 86803 HEPATITIS C AB TEST: CPT

## 2023-05-27 PROCEDURE — 86709 HEPATITIS A IGM ANTIBODY: CPT

## 2023-05-27 PROCEDURE — 99285 EMERGENCY DEPT VISIT HI MDM: CPT

## 2023-05-27 PROCEDURE — 85025 COMPLETE CBC W/AUTO DIFF WBC: CPT

## 2023-05-27 PROCEDURE — 87040 BLOOD CULTURE FOR BACTERIA: CPT

## 2023-05-27 PROCEDURE — 86376 MICROSOMAL ANTIBODY EACH: CPT

## 2023-05-27 PROCEDURE — P9016: CPT

## 2023-05-27 PROCEDURE — 73700 CT LOWER EXTREMITY W/O DYE: CPT | Mod: 26,LT,MA

## 2023-05-27 PROCEDURE — 82390 ASSAY OF CERULOPLASMIN: CPT

## 2023-05-27 PROCEDURE — 97162 PT EVAL MOD COMPLEX 30 MIN: CPT | Mod: GP

## 2023-05-27 PROCEDURE — 82550 ASSAY OF CK (CPK): CPT

## 2023-05-27 PROCEDURE — 85027 COMPLETE CBC AUTOMATED: CPT

## 2023-05-27 PROCEDURE — 86038 ANTINUCLEAR ANTIBODIES: CPT

## 2023-05-27 PROCEDURE — 93970 EXTREMITY STUDY: CPT | Mod: 26

## 2023-05-27 PROCEDURE — 86790 VIRUS ANTIBODY NOS: CPT

## 2023-05-27 PROCEDURE — 83605 ASSAY OF LACTIC ACID: CPT

## 2023-05-27 PROCEDURE — 36415 COLL VENOUS BLD VENIPUNCTURE: CPT

## 2023-05-27 PROCEDURE — 87529 HSV DNA AMP PROBE: CPT

## 2023-05-27 PROCEDURE — 86850 RBC ANTIBODY SCREEN: CPT

## 2023-05-27 PROCEDURE — 86708 HEPATITIS A ANTIBODY: CPT

## 2023-05-27 PROCEDURE — 86665 EPSTEIN-BARR CAPSID VCA: CPT

## 2023-05-27 PROCEDURE — 86705 HEP B CORE ANTIBODY IGM: CPT

## 2023-05-27 PROCEDURE — 86901 BLOOD TYPING SEROLOGIC RH(D): CPT

## 2023-05-27 PROCEDURE — 83735 ASSAY OF MAGNESIUM: CPT

## 2023-05-27 PROCEDURE — 87340 HEPATITIS B SURFACE AG IA: CPT

## 2023-05-27 PROCEDURE — 82164 ANGIOTENSIN I ENZYME TEST: CPT

## 2023-05-27 PROCEDURE — 86664 EPSTEIN-BARR NUCLEAR ANTIGEN: CPT

## 2023-05-27 PROCEDURE — 99223 1ST HOSP IP/OBS HIGH 75: CPT

## 2023-05-27 PROCEDURE — 76705 ECHO EXAM OF ABDOMEN: CPT

## 2023-05-27 PROCEDURE — 86255 FLUORESCENT ANTIBODY SCREEN: CPT

## 2023-05-27 PROCEDURE — 86923 COMPATIBILITY TEST ELECTRIC: CPT

## 2023-05-27 PROCEDURE — 74174 CTA ABD&PLVS W/CONTRAST: CPT | Mod: 26,MA

## 2023-05-27 PROCEDURE — 86381 MITOCHONDRIAL ANTIBODY EACH: CPT

## 2023-05-27 PROCEDURE — 73552 X-RAY EXAM OF FEMUR 2/>: CPT | Mod: 26,LT

## 2023-05-27 RX ORDER — OXYCODONE HYDROCHLORIDE 5 MG/1
20 TABLET ORAL ONCE
Refills: 0 | Status: DISCONTINUED | OUTPATIENT
Start: 2023-05-27 | End: 2023-05-27

## 2023-05-27 RX ORDER — ACETAMINOPHEN 500 MG
650 TABLET ORAL ONCE
Refills: 0 | Status: COMPLETED | OUTPATIENT
Start: 2023-05-27 | End: 2023-05-27

## 2023-05-27 RX ADMIN — OXYCODONE HYDROCHLORIDE 20 MILLIGRAM(S): 5 TABLET ORAL at 21:04

## 2023-05-27 RX ADMIN — OXYCODONE HYDROCHLORIDE 20 MILLIGRAM(S): 5 TABLET ORAL at 21:40

## 2023-05-27 RX ADMIN — Medication 650 MILLIGRAM(S): at 15:24

## 2023-05-27 RX ADMIN — OXYCODONE HYDROCHLORIDE 20 MILLIGRAM(S): 5 TABLET ORAL at 16:43

## 2023-05-27 RX ADMIN — Medication 650 MILLIGRAM(S): at 16:39

## 2023-05-27 NOTE — H&P ADULT - HISTORY OF PRESENT ILLNESS
SUBJECTIVE:  63y Male with PMH of thyroid cancer with mets, h/o of kyphoplasty, h/o recent RLE DVT on lovenox presents from home with inability to ambulate. Patient reports he started to experience intermittent pain in L thigh and went for CT and duplex today. When came back, started to experience unbearable pain in L high and observed some bruising limiting his mobility. Therefore presents to the ED  Denies any fever, chills, headache, neurological deficits, nausea, vomiting, chest pain, palpitations, shortness of breathe, cough, abdominal pain, hematochezia, melena, hematemesis, diarrhea or constipation    In the ED:-  Labs notable for hgb 7.5 (11.6 in 04/2023), Na 131, K 5.4 hemolyzed  CTAP/CT thigh showed hematoma in L rectus femoris  T(C): 36.6 (05-27-23 @ 18:58), Max: 37.1 (05-27-23 @ 14:14)  T(F): 97.8 (05-27-23 @ 18:58), Max: 98.7 (05-27-23 @ 14:14)  HR: 98 (05-27-23 @ 18:58) (62 - 98)  BP: 120/87 (05-27-23 @ 18:58) (107/51 - 124/81)  RR: 20 (05-27-23 @ 18:58) (18 - 20)  SpO2: 97% (05-27-23 @ 18:58) (97% - 97%)    Admitted to SDU for L thigh hematoma

## 2023-05-27 NOTE — ED PROVIDER NOTE - PHYSICAL EXAMINATION
CONSTITUTIONAL:  in no acute distress.   SKIN: warm, dry  HEAD: Normocephalic; atraumatic.  EYES: PERRL, EOMI, normal sclera and conjunctiva   ENT: No nasal discharge; airway clear.  NECK: Supple; non tender.  CARD:  Regular rate and rhythm.   RESP: NO inc WOB   ABD: soft ntnd  EXT: Normal ROM.  tender to right femur/hip neurovasculalry intact   LYMPH: No acute cervical adenopathy.  NEURO: Alert, oriented, grossly unremarkable  PSYCH: Cooperative, appropriate.

## 2023-05-27 NOTE — ED PROVIDER NOTE - CLINICAL SUMMARY MEDICAL DECISION MAKING FREE TEXT BOX
Patient arrived at the [facility] site with LLE pain and swelling. Imaging shows large hematoma. patient transferred to [facility] for further management. surgery consulted who recommends CTA. No acute intervention as per surgery. Patient admitted to medicine for serial hgb

## 2023-05-27 NOTE — ED PROVIDER NOTE - ATTENDING CONTRIBUTION TO CARE
63-year-old male above past medical history on subcutaneous Lovenox injections for right lower extremity DVT then by wife for evaluation of progressively worsening atraumatic left thigh pain, already had a duplex, today was in and out of doctors offices and had a CT chest abdomen pelvis with IV contrast, upon return to [place] was unable to climb his stairs due to pain, again no falls, no change in the bruising to his abdomen from his injections, CAT scans from today reviewed and reported no acute intra-abdominal pathology, on exam vitals appreciated, patient pale, abdomen obese, soft nontender with ecchymosis to lower abdominal wall, left lower extremity held externally rotated with tenderness and subtle bluish discoloration to the medial thigh, has significant pain on attempted internal rotation of hip, has no warmth or erythema but does have tenderness to the medial mid thigh, is neurovascularly intact with trace pedal edema distally, concern for hematoma versus deep space infection prompting CAT scan (Noncon as received contrast earlier today), results appreciated, will transfer [place] for surgical/IR evaluation, is hemodynamically stable at this moment though will likely require transfusion (hemoglobin in April 11, recent hemoglobin 9) Dr. Boxer excepts transfer.

## 2023-05-27 NOTE — ED ADULT NURSE REASSESSMENT NOTE - NS ED NURSE REASSESS COMMENT FT1
Patient being transferred to ED [place] for Vascular eval. Report given to Charge AUDREY Hudson who will endorse report to crit lead. Awaiting EMS at this time.

## 2023-05-27 NOTE — ED PROVIDER NOTE - PROGRESS NOTE DETAILS
Patient with large hematoma, will repeat blood pressure, hemoglobin in April was 11, more recent hemoglobin was 9, today 7.5, discussed with [facility], will transfer, if hypotensive currently will consider blood Patient arrived at the [facility], vitals stable. Surgery consulted who is recommending CTA. Mari: CTA resulted, spoke with surgery team- recommending admission to medicine service for monitoring of hemoglobin. No surgical intervention necessary at this time.

## 2023-05-27 NOTE — H&P ADULT - ATTENDING COMMENTS
Patient seen and examined at bedside independently of the residents. I read the resident's note and agree with the plan with the additions and corrections as noted below. My note supersedes the resident's note.     ROS:   Negative except in HPI.       PMH: Thyroid cancer with mets, Recent RLE DVT (on lovenox started few weeks ago), Kyphoplasty    FHx: Reviewed. No fhx of asthma/copd, No fhx of liver and pulmonary disease. No fhx of hematological disorder.     Physical Exam:  GEN: No acute distress. Awake, Alert and oriented x 3.   Head: Atraumatic, Normocephalic.   Eye: PEERLA. No sclera icterus. EOMI.   ENT: Normal oropharynx, no thyromegaly, no mass, no lymphadenopathy.   LUNGS: Clear to auscultation bilaterally. No wheeze/rales/crackles.   HEART: Normal. S1/S2 present. RRR. No murmur/gallops.   ABD: Soft, non-tender, non-distended. Bowel sounds present.   EXT: No pitting edema. No erythema.  Left thigh soft, but tenderness to palpation in medial thigh. ROM intact.   Integumentary: No rash, No sore, No petechia.   NEURO: CN III-XII intact. Strength: 5/5 b/l ULE. Sensory intact b/l ULE.     Vital Signs Last 24 Hrs  T(C): 36.2 (27 May 2023 23:59), Max: 37.1 (27 May 2023 14:14)  T(F): 97.1 (27 May 2023 23:59), Max: 98.7 (27 May 2023 14:14)  HR: 97 (27 May 2023 23:59) (62 - 98)  BP: 119/88 (27 May 2023 23:59) (107/51 - 124/81)  BP(mean): --  RR: 18 (27 May 2023 23:59) (18 - 20)  SpO2: 97% (27 May 2023 23:59) (97% - 97%)    Parameters below as of 27 May 2023 23:59  Patient On (Oxygen Delivery Method): room air      Please see the above notes for Labs and radiology.     Assessment and Plan:     62 yo M with hx of Thyroid cancer with mets, Recent RLE DVT (on lovenox started 3 weeks ago), Kyphoplasty presents to ED for few days history of Left thigh pain, worse today.     Left thigh pain 2/2 left thigh hematoma   - CT shows Intramuscular hematoma within the rectus femoris measuring 14.9 x 5 x 1.9 cm. Layering hyperdense material within the hematoma likely reflecting acute blood products.  Partially visualized intermediate density collections within the left lower abdominal wall, likely also reflecting blood products.  - Hb ~ 7.5 (baseline Hb ~ 11.6)  - s/p evaluated by surgery in ED --> no acute surgical intervention.  - Hold AC  - monitor CBC and transfuse if Hb < 7.   - pain control and compression to left thigh  - check CK and Lactic acid  - monitor signs of compartment.   - f/u Surgery     Recent RLE DVT   - check venous duplex LE    COPD   - not in exacerbation.   - c/w inhalers.   - patient is on chronic prednisone 10mg BID?? from [facility] discharge med rec. (Need clarification with daughter in AM).     Thyroid cancer with mets - outpatient follow up.   Hypothyroidism - on synthroid.   HTN - c/w home med  DM II - monitor FS AC HS. Insulin regimen.      DVT ppx: check venous duplex LE. If negative, may start on SCD.   GI ppx: PPI   Diet: NPO for now.   Activity: as tolerated.     Date seen by the attendin2023  Total time spent: 75 minutes.

## 2023-05-27 NOTE — H&P ADULT - ASSESSMENT
63y Male with PMH of thyroid cancer with mets, paralyzed phernic nerve and diaphragm, COPD on , Hypertension, Diabetes, h/o of kyphoplast, h/o recent RLE DVT on lovenox presents from home with inability to ambulate    #L rectus femoris hematoma  #Acute Anemia  - Active T + S  - lactate 1.6  - surgery recs no surgical intervention  - CBC q6h  - avoid AC  - monitor off AC    #Thyroid CA with mets  - followup OP  - lytic lesions in b/l kidneys on CTAP    #Paralyzed phrnic nerve  #COPD  - c/w albuteol PRN, symbicort    #HTN  - c/w    #DM  - c/w    DVT Prophylaxis: None  Diet: NPO  GI Prophylaxis: PPI  Activity: IAT  Code Status: Full  Disposition: Acute 63y Male with PMH of thyroid cancer with mets, paralyzed phernic nerve and diaphragm, COPD on , Hypertension, Diabetes, h/o of kyphoplast, h/o recent RLE DVT on lovenox presents from home with inability to ambulate    #L rectus femoris hematoma  #Acute Anemia  - Active T + S  - lactate 1.6  - surgery recs no surgical intervention  - CBC q6h  - avoid AC  - monitor off AC  - consult vasc for IVC filter eval    #Thyroid CA with mets  - followup OP  - lytic lesions in b/l kidneys on CTAP  - non-formulary trametinib, dabrafenib  - percocet PRN  - allergic to dilaudid and morphine. Causes decreased respiratory drive    #Paralyzed phrenic nerve  #COPD  - c/w albuteol PRN, symbicort, duonebs  - not in exacerbation    #HTN  - c/w metoprolol xl    #HLD  - start lipitor    DVT Prophylaxis: None  Diet: DASH  GI Prophylaxis: PPI  Activity: IAT  Code Status: Full  Disposition: Acute

## 2023-05-27 NOTE — H&P ADULT - NSHPPHYSICALEXAM_GEN_ALL_CORE
PHYSICAL EXAM:  GEN: No acute distress  HEENT: Normocephalic  NECK: Supple  LUNGS: Decreased breathe sounds  HEART: Regular  ABD: Non-distended  EXT: No pitting edema, LLE bruising, tenderness  NEURO: Non-focal  PSYCH: Mood is appropriate, following commands

## 2023-05-27 NOTE — ED ADULT NURSE NOTE - NSFALLUNIVINTERV_ED_ALL_ED
Bed/Stretcher in lowest position, wheels locked, appropriate side rails in place/Call bell, personal items and telephone in reach/Instruct patient to call for assistance before getting out of bed/chair/stretcher/Non-slip footwear applied when patient is off stretcher/[place] to call system/Physically safe environment - no spills, clutter or unnecessary equipment/Purposeful proactive rounding/Room/bathroom lighting operational, light cord in reach

## 2023-05-27 NOTE — CONSULT NOTE ADULT - ASSESSMENT
63yM w/ PMHx of thyroid cancer with mets, h/o kyphoplasty, recent DVT in RLE on lovenox presents to the ED for atraumatic left thigh pain that began a few days ago. Physical exam findings, imaging, and labs as documented above.       PLAN:  - No acute surgical intervention required at this time  - Monitor vitals  - Serial CBC Q6H , monitor Hgb  - Recommend compression to left thigh  - Pain control  - PT/Rehab      Above plan discussed with Attending Surgeon Dr. Mello, patient, patient family, and Primary team  05-27-23 @ 20:38    SURGERY CONSULT SPECTRA: [id number]

## 2023-05-27 NOTE — ED PROVIDER NOTE - OBJECTIVE STATEMENT
61-year-old male past medical history of thyroid cancer alloplastic with mets coming in here for left thigh pain.  Patient was recently seen in [place] for his thyroid cancer was admitted and recently discharged.  Patient was having left-sided pleural pain for the past couple days and had a negative duplex done in [place].  Patient however on his way up the stairs to his house today was unable to ambulate due to pain.  Tender to left femur.  No other complaints

## 2023-05-28 LAB
ALBUMIN SERPL ELPH-MCNC: 3 G/DL — LOW (ref 3.5–5.2)
ALP SERPL-CCNC: 176 U/L — HIGH (ref 30–115)
ALT FLD-CCNC: 18 U/L — SIGNIFICANT CHANGE UP (ref 0–41)
ANION GAP SERPL CALC-SCNC: 10 MMOL/L — SIGNIFICANT CHANGE UP (ref 7–14)
ANION GAP SERPL CALC-SCNC: 9 MMOL/L — SIGNIFICANT CHANGE UP (ref 7–14)
AST SERPL-CCNC: 15 U/L — SIGNIFICANT CHANGE UP (ref 0–41)
BASOPHILS # BLD AUTO: 0.04 K/UL — SIGNIFICANT CHANGE UP (ref 0–0.2)
BASOPHILS NFR BLD AUTO: 0.4 % — SIGNIFICANT CHANGE UP (ref 0–1)
BILIRUB SERPL-MCNC: 0.5 MG/DL — SIGNIFICANT CHANGE UP (ref 0.2–1.2)
BLD GP AB SCN SERPL QL: SIGNIFICANT CHANGE UP
BUN SERPL-MCNC: 15 MG/DL — SIGNIFICANT CHANGE UP (ref 10–20)
BUN SERPL-MCNC: 16 MG/DL — SIGNIFICANT CHANGE UP (ref 10–20)
CALCIUM SERPL-MCNC: 8.5 MG/DL — SIGNIFICANT CHANGE UP (ref 8.4–10.5)
CALCIUM SERPL-MCNC: 8.8 MG/DL — SIGNIFICANT CHANGE UP (ref 8.4–10.5)
CHLORIDE SERPL-SCNC: 98 MMOL/L — SIGNIFICANT CHANGE UP (ref 98–110)
CHLORIDE SERPL-SCNC: 99 MMOL/L — SIGNIFICANT CHANGE UP (ref 98–110)
CK SERPL-CCNC: 249 U/L — HIGH (ref 0–225)
CO2 SERPL-SCNC: 25 MMOL/L — SIGNIFICANT CHANGE UP (ref 17–32)
CO2 SERPL-SCNC: 25 MMOL/L — SIGNIFICANT CHANGE UP (ref 17–32)
CREAT SERPL-MCNC: 0.6 MG/DL — LOW (ref 0.7–1.5)
CREAT SERPL-MCNC: 0.7 MG/DL — SIGNIFICANT CHANGE UP (ref 0.7–1.5)
EGFR: 104 ML/MIN/1.73M2 — SIGNIFICANT CHANGE UP
EGFR: 109 ML/MIN/1.73M2 — SIGNIFICANT CHANGE UP
EOSINOPHIL # BLD AUTO: 0.1 K/UL — SIGNIFICANT CHANGE UP (ref 0–0.7)
EOSINOPHIL NFR BLD AUTO: 0.9 % — SIGNIFICANT CHANGE UP (ref 0–8)
GLUCOSE SERPL-MCNC: 101 MG/DL — HIGH (ref 70–99)
GLUCOSE SERPL-MCNC: 109 MG/DL — HIGH (ref 70–99)
HCT VFR BLD CALC: 20.6 % — LOW (ref 42–52)
HCT VFR BLD CALC: 20.6 % — LOW (ref 42–52)
HCT VFR BLD CALC: 22.7 % — LOW (ref 42–52)
HGB BLD-MCNC: 6.5 G/DL — CRITICAL LOW (ref 14–18)
HGB BLD-MCNC: 6.6 G/DL — CRITICAL LOW (ref 14–18)
HGB BLD-MCNC: 7.4 G/DL — LOW (ref 14–18)
IMM GRANULOCYTES NFR BLD AUTO: 1.1 % — HIGH (ref 0.1–0.3)
LACTATE SERPL-SCNC: 1.3 MMOL/L — SIGNIFICANT CHANGE UP (ref 0.7–2)
LYMPHOCYTES # BLD AUTO: 1.37 K/UL — SIGNIFICANT CHANGE UP (ref 1.2–3.4)
LYMPHOCYTES # BLD AUTO: 12.6 % — LOW (ref 20.5–51.1)
MAGNESIUM SERPL-MCNC: 1.6 MG/DL — LOW (ref 1.8–2.4)
MCHC RBC-ENTMCNC: 26.4 PG — LOW (ref 27–31)
MCHC RBC-ENTMCNC: 27 PG — SIGNIFICANT CHANGE UP (ref 27–31)
MCHC RBC-ENTMCNC: 27.1 PG — SIGNIFICANT CHANGE UP (ref 27–31)
MCHC RBC-ENTMCNC: 31.6 G/DL — LOW (ref 32–37)
MCHC RBC-ENTMCNC: 32 G/DL — SIGNIFICANT CHANGE UP (ref 32–37)
MCHC RBC-ENTMCNC: 32.6 G/DL — SIGNIFICANT CHANGE UP (ref 32–37)
MCV RBC AUTO: 83.2 FL — SIGNIFICANT CHANGE UP (ref 80–94)
MCV RBC AUTO: 83.7 FL — SIGNIFICANT CHANGE UP (ref 80–94)
MCV RBC AUTO: 84.4 FL — SIGNIFICANT CHANGE UP (ref 80–94)
MONOCYTES # BLD AUTO: 0.96 K/UL — HIGH (ref 0.1–0.6)
MONOCYTES NFR BLD AUTO: 8.8 % — SIGNIFICANT CHANGE UP (ref 1.7–9.3)
NEUTROPHILS # BLD AUTO: 8.32 K/UL — HIGH (ref 1.4–6.5)
NEUTROPHILS NFR BLD AUTO: 76.2 % — HIGH (ref 42.2–75.2)
NRBC # BLD: 0 /100 WBCS — SIGNIFICANT CHANGE UP (ref 0–0)
PLATELET # BLD AUTO: 226 K/UL — SIGNIFICANT CHANGE UP (ref 130–400)
PLATELET # BLD AUTO: 230 K/UL — SIGNIFICANT CHANGE UP (ref 130–400)
PLATELET # BLD AUTO: 240 K/UL — SIGNIFICANT CHANGE UP (ref 130–400)
PMV BLD: 10.3 FL — SIGNIFICANT CHANGE UP (ref 7.4–10.4)
PMV BLD: 10.4 FL — SIGNIFICANT CHANGE UP (ref 7.4–10.4)
PMV BLD: 10.7 FL — HIGH (ref 7.4–10.4)
POTASSIUM SERPL-MCNC: 4.7 MMOL/L — SIGNIFICANT CHANGE UP (ref 3.5–5)
POTASSIUM SERPL-MCNC: 5.6 MMOL/L — HIGH (ref 3.5–5)
POTASSIUM SERPL-SCNC: 4.7 MMOL/L — SIGNIFICANT CHANGE UP (ref 3.5–5)
POTASSIUM SERPL-SCNC: 5.6 MMOL/L — HIGH (ref 3.5–5)
PROT SERPL-MCNC: 5 G/DL — LOW (ref 6–8)
RBC # BLD: 2.44 M/UL — LOW (ref 4.7–6.1)
RBC # BLD: 2.46 M/UL — LOW (ref 4.7–6.1)
RBC # BLD: 2.73 M/UL — LOW (ref 4.7–6.1)
RBC # FLD: 18.5 % — HIGH (ref 11.5–14.5)
RBC # FLD: 19.5 % — HIGH (ref 11.5–14.5)
RBC # FLD: 19.6 % — HIGH (ref 11.5–14.5)
SODIUM SERPL-SCNC: 133 MMOL/L — LOW (ref 135–146)
SODIUM SERPL-SCNC: 133 MMOL/L — LOW (ref 135–146)
WBC # BLD: 10.76 K/UL — SIGNIFICANT CHANGE UP (ref 4.8–10.8)
WBC # BLD: 10.91 K/UL — HIGH (ref 4.8–10.8)
WBC # BLD: 11.44 K/UL — HIGH (ref 4.8–10.8)
WBC # FLD AUTO: 10.76 K/UL — SIGNIFICANT CHANGE UP (ref 4.8–10.8)
WBC # FLD AUTO: 10.91 K/UL — HIGH (ref 4.8–10.8)
WBC # FLD AUTO: 11.44 K/UL — HIGH (ref 4.8–10.8)

## 2023-05-28 PROCEDURE — 99233 SBSQ HOSP IP/OBS HIGH 50: CPT

## 2023-05-28 RX ORDER — LEVOTHYROXINE SODIUM 125 MCG
1 TABLET ORAL
Refills: 0 | DISCHARGE

## 2023-05-28 RX ORDER — IPRATROPIUM/ALBUTEROL SULFATE 18-103MCG
3 AEROSOL WITH ADAPTER (GRAM) INHALATION EVERY 12 HOURS
Refills: 0 | Status: DISCONTINUED | OUTPATIENT
Start: 2023-05-28 | End: 2023-05-28

## 2023-05-28 RX ORDER — ATORVASTATIN CALCIUM 80 MG/1
10 TABLET, FILM COATED ORAL AT BEDTIME
Refills: 0 | Status: DISCONTINUED | OUTPATIENT
Start: 2023-05-28 | End: 2023-06-02

## 2023-05-28 RX ORDER — TRAMETINIB 0.5 MG/1
1 TABLET, FILM COATED ORAL
Refills: 0 | DISCHARGE

## 2023-05-28 RX ORDER — DABRAFENIB 75 MG/1
150 CAPSULE ORAL EVERY 12 HOURS
Refills: 0 | Status: DISCONTINUED | OUTPATIENT
Start: 2023-05-28 | End: 2023-06-03

## 2023-05-28 RX ORDER — MIRTAZAPINE 45 MG/1
1 TABLET, ORALLY DISINTEGRATING ORAL
Refills: 0 | DISCHARGE

## 2023-05-28 RX ORDER — LEVOTHYROXINE SODIUM 125 MCG
1 TABLET ORAL
Qty: 0 | Refills: 0 | DISCHARGE

## 2023-05-28 RX ORDER — DABRAFENIB 75 MG/1
2 CAPSULE ORAL
Refills: 0 | DISCHARGE

## 2023-05-28 RX ORDER — METOPROLOL TARTRATE 50 MG
12.5 TABLET ORAL
Refills: 0 | Status: DISCONTINUED | OUTPATIENT
Start: 2023-05-28 | End: 2023-06-03

## 2023-05-28 RX ORDER — LEVOTHYROXINE SODIUM 125 MCG
200 TABLET ORAL DAILY
Refills: 0 | Status: DISCONTINUED | OUTPATIENT
Start: 2023-05-28 | End: 2023-06-03

## 2023-05-28 RX ORDER — MIRTAZAPINE 45 MG/1
15 TABLET, ORALLY DISINTEGRATING ORAL AT BEDTIME
Refills: 0 | Status: DISCONTINUED | OUTPATIENT
Start: 2023-05-28 | End: 2023-06-03

## 2023-05-28 RX ORDER — MONTELUKAST 4 MG/1
10 TABLET, CHEWABLE ORAL DAILY
Refills: 0 | Status: DISCONTINUED | OUTPATIENT
Start: 2023-05-28 | End: 2023-06-03

## 2023-05-28 RX ORDER — OMEPRAZOLE 10 MG/1
1 CAPSULE, DELAYED RELEASE ORAL
Qty: 0 | Refills: 0 | DISCHARGE

## 2023-05-28 RX ORDER — ALBUTEROL 90 UG/1
2 AEROSOL, METERED ORAL EVERY 6 HOURS
Refills: 0 | Status: DISCONTINUED | OUTPATIENT
Start: 2023-05-28 | End: 2023-06-03

## 2023-05-28 RX ORDER — BUDESONIDE AND FORMOTEROL FUMARATE DIHYDRATE 160; 4.5 UG/1; UG/1
2 AEROSOL RESPIRATORY (INHALATION)
Refills: 0 | Status: DISCONTINUED | OUTPATIENT
Start: 2023-05-28 | End: 2023-06-03

## 2023-05-28 RX ORDER — TRAMETINIB 0.5 MG/1
2 TABLET, FILM COATED ORAL DAILY
Refills: 0 | Status: DISCONTINUED | OUTPATIENT
Start: 2023-05-28 | End: 2023-06-03

## 2023-05-28 RX ORDER — SODIUM ZIRCONIUM CYCLOSILICATE 10 G/10G
10 POWDER, FOR SUSPENSION ORAL THREE TIMES A DAY
Refills: 0 | Status: COMPLETED | OUTPATIENT
Start: 2023-05-28 | End: 2023-05-29

## 2023-05-28 RX ORDER — ONDANSETRON 8 MG/1
1 TABLET, FILM COATED ORAL
Refills: 0 | DISCHARGE

## 2023-05-28 RX ORDER — IPRATROPIUM/ALBUTEROL SULFATE 18-103MCG
3 AEROSOL WITH ADAPTER (GRAM) INHALATION EVERY 6 HOURS
Refills: 0 | Status: DISCONTINUED | OUTPATIENT
Start: 2023-05-28 | End: 2023-06-03

## 2023-05-28 RX ORDER — TAMSULOSIN HYDROCHLORIDE 0.4 MG/1
0.4 CAPSULE ORAL AT BEDTIME
Refills: 0 | Status: DISCONTINUED | OUTPATIENT
Start: 2023-05-28 | End: 2023-06-03

## 2023-05-28 RX ORDER — PANTOPRAZOLE SODIUM 20 MG/1
40 TABLET, DELAYED RELEASE ORAL
Refills: 0 | Status: DISCONTINUED | OUTPATIENT
Start: 2023-05-28 | End: 2023-06-03

## 2023-05-28 RX ORDER — ONDANSETRON 8 MG/1
4 TABLET, FILM COATED ORAL EVERY 8 HOURS
Refills: 0 | Status: DISCONTINUED | OUTPATIENT
Start: 2023-05-28 | End: 2023-06-03

## 2023-05-28 RX ORDER — ESOMEPRAZOLE MAGNESIUM 40 MG/1
1 CAPSULE, DELAYED RELEASE ORAL
Refills: 0 | DISCHARGE

## 2023-05-28 RX ORDER — SENNA PLUS 8.6 MG/1
2 TABLET ORAL AT BEDTIME
Refills: 0 | Status: DISCONTINUED | OUTPATIENT
Start: 2023-05-28 | End: 2023-06-03

## 2023-05-28 RX ADMIN — PANTOPRAZOLE SODIUM 40 MILLIGRAM(S): 20 TABLET, DELAYED RELEASE ORAL at 05:41

## 2023-05-28 RX ADMIN — SODIUM ZIRCONIUM CYCLOSILICATE 10 GRAM(S): 10 POWDER, FOR SUSPENSION ORAL at 18:10

## 2023-05-28 RX ADMIN — Medication 3 MILLILITER(S): at 09:02

## 2023-05-28 RX ADMIN — ATORVASTATIN CALCIUM 10 MILLIGRAM(S): 80 TABLET, FILM COATED ORAL at 21:49

## 2023-05-28 RX ADMIN — MONTELUKAST 10 MILLIGRAM(S): 4 TABLET, CHEWABLE ORAL at 11:25

## 2023-05-28 RX ADMIN — Medication 3 MILLILITER(S): at 19:43

## 2023-05-28 RX ADMIN — TAMSULOSIN HYDROCHLORIDE 0.4 MILLIGRAM(S): 0.4 CAPSULE ORAL at 21:49

## 2023-05-28 RX ADMIN — DABRAFENIB 150 MILLIGRAM(S): 75 CAPSULE ORAL at 05:39

## 2023-05-28 RX ADMIN — DABRAFENIB 150 MILLIGRAM(S): 75 CAPSULE ORAL at 18:10

## 2023-05-28 RX ADMIN — SENNA PLUS 2 TABLET(S): 8.6 TABLET ORAL at 21:50

## 2023-05-28 RX ADMIN — BUDESONIDE AND FORMOTEROL FUMARATE DIHYDRATE 2 PUFF(S): 160; 4.5 AEROSOL RESPIRATORY (INHALATION) at 07:57

## 2023-05-28 RX ADMIN — Medication 12.5 MILLIGRAM(S): at 05:39

## 2023-05-28 RX ADMIN — Medication 200 MICROGRAM(S): at 05:39

## 2023-05-28 RX ADMIN — ONDANSETRON 4 MILLIGRAM(S): 8 TABLET, FILM COATED ORAL at 19:48

## 2023-05-28 RX ADMIN — Medication 10 MILLIGRAM(S): at 05:39

## 2023-05-28 RX ADMIN — MIRTAZAPINE 15 MILLIGRAM(S): 45 TABLET, ORALLY DISINTEGRATING ORAL at 21:49

## 2023-05-28 RX ADMIN — Medication 12.5 MILLIGRAM(S): at 18:10

## 2023-05-28 RX ADMIN — Medication 10 MILLIGRAM(S): at 18:10

## 2023-05-28 RX ADMIN — SODIUM ZIRCONIUM CYCLOSILICATE 10 GRAM(S): 10 POWDER, FOR SUSPENSION ORAL at 21:50

## 2023-05-28 NOTE — PROGRESS NOTE ADULT - ASSESSMENT
63yM w/ PMHx of thyroid cancer with mets, h/o kyphoplasty, recent DVT in RLE on lovenox presents to the ED for atraumatic left thigh pain that began a few days ago. Physical exam findings, imaging, and labs as documented above.     PLAN:  - No acute surgical intervention required at this time  - Admit to medicine   - Monitor vitals  - Serial CBC Q6H , monitor Hgb  - Recommend compression to left thigh  - Pain control  - PT/Rehab

## 2023-05-28 NOTE — PROGRESS NOTE ADULT - ATTENDING COMMENTS
64 yo M with hx of Thyroid cancer with mets, Recent RLE DVT (on lovenox started 3 weeks ago [facility]), Kyphoplasty presents to ED for with a complaint of Left thigh pain x 1 week.     Left thigh pain 2/2 left thigh hematoma   Acute blood loss anemia   - no signs of compartment syndrome   - CT shows Intramuscular hematoma within the rectus femoris measuring 14.9 x 5 x 1.9 cm. Layering hyperdense material within the hematoma likely reflecting acute blood products.  Partially visualized intermediate density collections within the left lower abdominal wall, likely also reflecting blood products.  - s/p evaluated by surgery in ED --> no acute surgical intervention.  - Hold AC  - vascular surgery for IVC filter   - monitor CBC q8h and transfuse to keep hgb above 7.5   - pain control and compression to left thigh    Recent RLE DVT   - check venous duplex LE    COPD   - not in exacerbation.   - c/w inhalers   - patient is on chronic prednisone 10mg BID     Thyroid cancer with mets - follows  [facility]   Hypothyroidism - on synthroid.   HTN - c/w home med  DM II - monitor FS AC HS. Insulin regimen.      DVT ppx: SCD  GI ppx: PPI       Dispo: Monitor hgb, vascular for IVC filter   Plan of are d/w patient and wife 62 yo M with hx of Thyroid cancer with mets, Recent [place] [facility] [place] presents to ED for with a complaint of Left thigh pain x 1 week.     Left thigh pain 2/2 left thigh hematoma   Acute blood loss anemia   - no signs of compartment syndrome   - CT shows Intramuscular hematoma within the rectus femoris measuring 14.9 x 5 x 1.9 cm. Layering hyperdense material within the hematoma likely reflecting acute blood products.  Partially visualized intermediate density collections within the left lower abdominal wall, likely also reflecting blood products.  - s/p evaluated by surgery in ED --> no acute surgical intervention.  - Hold AC  - vascular surgery for IVC filter   - monitor CBC q8h and transfuse to keep hgb above 7.5   - pain control and compression to left thigh    Recent RLE DVT   - check venous duplex LE (performed, pending read)     Hyperkalemia, mild   - low k diet   - Lokelma TID, repeat BMP     COPD   - not in exacerbation.   - c/w inhalers   - patient is on chronic prednisone 10mg BID     [place] [facility] [place]   Hypothyroidism - on synthroid.   HTN - c/w home med  DM II - monitor FS AC HS. Insulin regimen.      DVT ppx: SCD  GI ppx: PPI       Dispo: Monitor hgb, vascular for IVC filter   Plan of are d/w patient and wife

## 2023-05-28 NOTE — PROGRESS NOTE ADULT - SUBJECTIVE AND OBJECTIVE BOX
JOAQUIN HERNANDEZ 63y Male  MRN#: [id number]   CODE STATUS:________    Hospital Day: 1d    Pt is currently admitted with the primary diagnosis of l thigh edema/hematoma     SUBJECTIVE  Overnight events: none    Subjective complaints: pt complaining of some SOB, requesting albuterol nebs and inhaler like he takes at home.     Present Today:   - West:  No [  ], Yes [   ] : Indication:     - Type of IV Access:       .. CVC/Piccline:  No [  ], Yes [   ] : Indication:       .. Midline: No [  ], Yes [   ] : Indication:                                             ----------------------------------------------------------  OBJECTIVE  PAST MEDICAL & SURGICAL HISTORY  Thyroid cancer    HTN (hypertension)    COPD (chronic obstructive pulmonary disease)    Borderline diabetes    H/O thyroidectomy                                              -----------------------------------------------------------  ALLERGIES:  Allergy Status Unknown                                            ------------------------------------------------------------    HOME MEDICATIONS  Home Medications:  Advair Diskus: 2 puff(s) inhaled 2 times a day (07 Feb 2020 07:22)  Bactrim  mg-160 mg oral tablet: 1 tab(s) orally Monday, Wednesday, and Friday (28 May 2023 02:01)  dabrafenib 75 mg oral capsule: 2 orally 2 times a day (20 Apr 2023 03:36)  esomeprazole 40 mg oral delayed release capsule: 1 tab(s) orally once a day (28 May 2023 01:59)  levothyroxine 200 mcg (0.2 mg) oral capsule: 1 cap(s) orally once a day (28 May 2023 02:02)  mirtazapine 15 mg oral tablet: 1 tab(s) orally once a day (at bedtime) (28 May 2023 01:59)  pravastatin 40 mg oral tablet: 1 tab(s) orally once a day (23 Nov 2019 18:22)  predniSONE 10 mg oral tablet: 1 orally 2 times a day (20 Apr 2023 03:35)  Singulair 10 mg oral tablet: 1 tab(s) orally once a day (23 Nov 2019 18:22)  Spiriva 18 mcg inhalation capsule: 1 cap(s) inhaled once a day (23 Nov 2019 18:22)  Toprol-XL 25 mg oral tablet, extended release: 1 tab(s) orally once a day (23 Nov 2019 18:22)  trametinib 2 mg oral tablet: 1 tab(s) orally once a day (28 May 2023 02:01)  Ventolin HFA 90 mcg/inh inhalation aerosol: 2 puff(s) inhaled 4 times a day (23 Nov 2019 18:22)                           MEDICATIONS:  STANDING MEDICATIONS  albuterol/ipratropium for Nebulization 3 milliLiter(s) Nebulizer every 12 hours  atorvastatin 10 milliGRAM(s) Oral at bedtime  budesonide 160 MICROgram(s)/formoterol 4.5 MICROgram(s) Inhaler 2 Puff(s) Inhalation two times a day  dabrafenib 150 milliGRAM(s) Oral every 12 hours  levothyroxine 200 MICROGram(s) Oral daily  metoprolol tartrate 12.5 milliGRAM(s) Oral two times a day  mirtazapine 15 milliGRAM(s) Oral at bedtime  montelukast 10 milliGRAM(s) Oral daily  pantoprazole    Tablet 40 milliGRAM(s) Oral before breakfast  predniSONE   Tablet 10 milliGRAM(s) Oral two times a day  senna 2 Tablet(s) Oral at bedtime  tamsulosin 0.4 milliGRAM(s) Oral at bedtime  trametinib 2 milliGRAM(s) Oral daily  trimethoprim  160 mG/sulfamethoxazole 800 mG 1 Tablet(s) Oral <User Schedule>    PRN MEDICATIONS  albuterol    90 MICROgram(s) HFA Inhaler 2 Puff(s) Inhalation every 6 hours PRN  oxycodone    5 mG/acetaminophen 325 mG 2 Tablet(s) Oral every 4 hours PRN                                            ------------------------------------------------------------                                               --------------------------------------------------------------  LABS:                        7.5    9.48  )-----------( 205      ( 27 May 2023 16:30 )             23.5     05-27    131<L>  |  98  |  16  ----------------------------<  120<H>  5.4<H>   |  24  |  0.7    Ca    8.7      27 May 2023 16:30    TPro  5.4<L>  /  Alb  3.1<L>  /  TBili  0.4  /  DBili  x   /  AST  25  /  ALT  21  /  AlkPhos  189<H>  05-27    PT/INR - ( 27 May 2023 16:30 )   PT: 12.00 sec;   INR: 1.05 ratio         PTT - ( 27 May 2023 16:30 )  PTT:30.8 sec      Lactate, Blood: 1.6 mmol/L (05-27-23 @ 16:30)                                                        -------------------------------------------------------------  RADIOLOGY:                                            --------------------------------------------------------------  VITAL SIGNS: Last 24 Hours  T(C): 36.6 (28 May 2023 04:30), Max: 37.1 (27 May 2023 14:14)  T(F): 97.9 (28 May 2023 04:30), Max: 98.7 (27 May 2023 14:14)  HR: 100 (28 May 2023 04:30) (62 - 100)  BP: 133/93 (28 May 2023 04:30) (107/51 - 133/93)  BP(mean): --  RR: 18 (28 May 2023 04:30) (18 - 20)  SpO2: 97% (28 May 2023 04:30) (97% - 97%)    PHYSICAL EXAM:  General: Awake, oriented and resting comfortably, no acute distress  Head: normocephalic, atraumatic  ENT:  moist mucous membranes  Cardiac: regular rate and rhythm, normal S1 and S2, no murmurs, rubs or gallops  Respiratory: mild rhonchi  Abdomen: normoactive bowel sounds x4, non tender, nondistended  Ext:  r thigh has edema, no ecchymosis visible. pt complaining of pressure in the r thigh.   Neuro: A&O x3, no focal neurological deficits                                                --------------------------------------------------------------    ASSESSMENT & PLAN    Past medical history and hospital course     63y Male with PMH of thyroid cancer with mets, paralyzed phernic nerve and diaphragm, COPD on , Hypertension, Diabetes, h/o of kyphoplast, h/o recent RLE DVT on lovenox presents from home with inability to ambulate    #L rectus femoris hematoma  #Acute Anemia  - Active T + S  - lactate 1.6  - surgery recs no surgical intervention  - CBC q6h  - avoid AC  - monitor off AC  - consult vasc for IVC filter eval    #Thyroid CA with mets  - followup OP  - lytic lesions in b/l kidneys on CTAP  - non-formulary trametinib, dabrafenib  - percocet PRN  - allergic to dilaudid and morphine. Causes decreased respiratory drive    #Paralyzed phrenic nerve  #COPD  - c/w albuteol PRN, symbicort, duonebs  - not in exacerbation    #HTN  - c/w metoprolol xl    #HLD  - start lipitor    DVT Prophylaxis: None  Diet: DASH  GI Prophylaxis: PPI  Activity: IAT  Code Status: Full  Disposition: Acute    handoff: follow cbc

## 2023-05-28 NOTE — PATIENT PROFILE ADULT - FUNCTIONAL ASSESSMENT - DAILY ACTIVITY SCORE.
Otolaryngologist Procedure Text (A): After obtaining clear surgical margins the patient was sent to otolaryngology for surgical repair.  The patient understands they will receive post-surgical care and follow-up from the referring physician's office. 14

## 2023-05-28 NOTE — CONSULT NOTE ADULT - ASSESSMENT
ASSESSMENT:63yM w/ PMHx of thyroid cancer with mets, h/o kyphoplasty, recent DVT in RLE on lovenox presents to the ED for atraumatic left thigh pain that began a few days ago now with left thigh hematoma. Physical exam findings, imaging, and labs as documented above.  Vascular surgery consulted for evaluation of IVC filter    PLAN:   - No acute surgical intervention at this time  - Team to evaluate need for IVC filter at this time  - Patient seen/examined or Plan Discussed with Fellow, Dr. Kelly  - Plan to be discussed with Attending, Dr. Poon    SPECTRA [id number]

## 2023-05-28 NOTE — PATIENT PROFILE ADULT - FALL HARM RISK - HARM RISK INTERVENTIONS
Assistance with ambulation/Assistance OOB with selected safe patient handling equipment/Communicate Risk of Fall with Harm to all staff/Discuss with provider need for PT consult/Monitor gait and stability/Reinforce activity limits and safety measures with patient and family/Tailored Fall Risk Interventions/Visual Cue: Yellow wristband and red socks/Bed in lowest position, wheels locked, appropriate side rails in place/Call bell, personal items and telephone in reach/Instruct patient to call for assistance before getting out of bed or chair/Non-slip footwear when patient is out of bed/[place] to call system/Physically safe environment - no spills, clutter or unnecessary equipment/Purposeful Proactive Rounding/Room/bathroom lighting operational, light cord in reach

## 2023-05-28 NOTE — PROGRESS NOTE ADULT - SUBJECTIVE AND OBJECTIVE BOX
GENERAL SURGERY PROGRESS NOTE    Patient: JOAQUIN HERNANDEZ , 63y ([birth year])Male   MRN: [id number]  Location: [address]  Visit: 05-27-23 Inpatient  Date: 05-28-23 @ 00:42    Procedure/Dx/Injuries: L thigh hematoma    Events of past 24 hours: Admitted to medicine for pain control, and CBC q6. Hg at 7.5. CTA showed no evidence of active extravasation.     PAST MEDICAL & SURGICAL HISTORY:  Thyroid cancer      HTN (hypertension)      COPD (chronic obstructive pulmonary disease)      Borderline diabetes      H/O thyroidectomy          Vitals:   T(F): 97.1 (05-27-23 @ 23:59), Max: 98.7 (05-27-23 @ 14:14)  HR: 97 (05-27-23 @ 23:59)  BP: 119/88 (05-27-23 @ 23:59)  RR: 18 (05-27-23 @ 23:59)  SpO2: 97% (05-27-23 @ 23:59)      Fluids:     I & O's:    PHYSICAL EXAM:  General: NAD, AAOx3, calm and cooperative  HEENT: NCAT, Trachea ML, Neck supple  Cardiac: RRR S1, S2  Respiratory: Chest rise equal bilaterally, no labored breathing  Abdomen: Soft, non-distended, non-tender  Extremities: BARRIOS, compartments soft, tenderness to LLE, ecchymosis present on left medial thigh   Musculoskeletal: Strength 5/5 BL UE/LE, ROM intact, compartments soft  Neuro: Sensation grossly intact and equal throughout, no focal deficits  Vascular: Pulses 2+ throughout, extremities well perfused  Skin: Warm/dry, normal color    MEDICATIONS  (STANDING):    MEDICATIONS  (PRN):      DVT PROPHYLAXIS:   GI PROPHYLAXIS:   ANTICOAGULATION:   ANTIBIOTICS:            LAB/STUDIES:  Labs:  CAPILLARY BLOOD GLUCOSE                              7.5    9.48  )-----------( 205      ( 27 May 2023 16:30 )             23.5       Auto Neutrophil %: 76.0 % (05-27-23 @ 16:30)  Auto Immature Granulocyte %: 1.1 % (05-27-23 @ 16:30)    05-27    131<L>  |  98  |  16  ----------------------------<  120<H>  5.4<H>   |  24  |  0.7      Calcium, Total Serum: 8.7 mg/dL (05-27-23 @ 16:30)      LFTs:             5.4  | 0.4  | 25       ------------------[189     ( 27 May 2023 16:30 )  3.1  | x    | 21          Lipase:x      Amylase:x         Lactate, Blood: 1.6 mmol/L (05-27-23 @ 16:30)      Coags:     12.00  ----< 1.05    ( 27 May 2023 16:30 )     30.8        IMAGING:  < from: CT Angio Abdomen and Pelvis w/ IV Cont (05.27.23 @ 20:49) >  No CTA evidence of active extravasation within the abdomen or pelvis.    Partially visualized left thigh hematoma, better visualized on recent CT   lower extremity.    Multiple collections within the abdominal wall subcutaneous tissues which   may reflect blood products.    < from: CT Lower Extremity No Cont, Left (05.27.23 @ 17:09) >  Intramuscular hematoma within the rectus femoris measuring 14.9 x 5 x 1.9  cm. Layering hyperdense material within the hematoma likely reflecting   acute blood products.    Partially visualized intermediate density collections within the left   lower abdominal wall, likely also reflecting blood products. Correlate   clinically.

## 2023-05-29 LAB
ANION GAP SERPL CALC-SCNC: 9 MMOL/L — SIGNIFICANT CHANGE UP (ref 7–14)
BUN SERPL-MCNC: 14 MG/DL — SIGNIFICANT CHANGE UP (ref 10–20)
CALCIUM SERPL-MCNC: 8.6 MG/DL — SIGNIFICANT CHANGE UP (ref 8.4–10.5)
CHLORIDE SERPL-SCNC: 100 MMOL/L — SIGNIFICANT CHANGE UP (ref 98–110)
CO2 SERPL-SCNC: 26 MMOL/L — SIGNIFICANT CHANGE UP (ref 17–32)
CREAT SERPL-MCNC: 0.7 MG/DL — SIGNIFICANT CHANGE UP (ref 0.7–1.5)
EGFR: 104 ML/MIN/1.73M2 — SIGNIFICANT CHANGE UP
GLUCOSE SERPL-MCNC: 110 MG/DL — HIGH (ref 70–99)
HCT VFR BLD CALC: 22.8 % — LOW (ref 42–52)
HGB BLD-MCNC: 7.4 G/DL — LOW (ref 14–18)
MAGNESIUM SERPL-MCNC: 1.7 MG/DL — LOW (ref 1.8–2.4)
MCHC RBC-ENTMCNC: 27.3 PG — SIGNIFICANT CHANGE UP (ref 27–31)
MCHC RBC-ENTMCNC: 32.5 G/DL — SIGNIFICANT CHANGE UP (ref 32–37)
MCV RBC AUTO: 84.1 FL — SIGNIFICANT CHANGE UP (ref 80–94)
NRBC # BLD: 0 /100 WBCS — SIGNIFICANT CHANGE UP (ref 0–0)
PLATELET # BLD AUTO: 235 K/UL — SIGNIFICANT CHANGE UP (ref 130–400)
PMV BLD: 10.5 FL — HIGH (ref 7.4–10.4)
POTASSIUM SERPL-MCNC: 4.8 MMOL/L — SIGNIFICANT CHANGE UP (ref 3.5–5)
POTASSIUM SERPL-SCNC: 4.8 MMOL/L — SIGNIFICANT CHANGE UP (ref 3.5–5)
RBC # BLD: 2.71 M/UL — LOW (ref 4.7–6.1)
RBC # FLD: 18.9 % — HIGH (ref 11.5–14.5)
SODIUM SERPL-SCNC: 135 MMOL/L — SIGNIFICANT CHANGE UP (ref 135–146)
WBC # BLD: 11.06 K/UL — HIGH (ref 4.8–10.8)
WBC # FLD AUTO: 11.06 K/UL — HIGH (ref 4.8–10.8)

## 2023-05-29 PROCEDURE — 99233 SBSQ HOSP IP/OBS HIGH 50: CPT

## 2023-05-29 PROCEDURE — 99222 1ST HOSP IP/OBS MODERATE 55: CPT

## 2023-05-29 RX ORDER — MAGNESIUM OXIDE 400 MG ORAL TABLET 241.3 MG
400 TABLET ORAL
Refills: 0 | Status: DISCONTINUED | OUTPATIENT
Start: 2023-05-29 | End: 2023-06-03

## 2023-05-29 RX ADMIN — Medication 1 TABLET(S): at 11:38

## 2023-05-29 RX ADMIN — SENNA PLUS 2 TABLET(S): 8.6 TABLET ORAL at 21:48

## 2023-05-29 RX ADMIN — BUDESONIDE AND FORMOTEROL FUMARATE DIHYDRATE 2 PUFF(S): 160; 4.5 AEROSOL RESPIRATORY (INHALATION) at 07:53

## 2023-05-29 RX ADMIN — ATORVASTATIN CALCIUM 10 MILLIGRAM(S): 80 TABLET, FILM COATED ORAL at 21:47

## 2023-05-29 RX ADMIN — DABRAFENIB 150 MILLIGRAM(S): 75 CAPSULE ORAL at 05:20

## 2023-05-29 RX ADMIN — MAGNESIUM OXIDE 400 MG ORAL TABLET 400 MILLIGRAM(S): 241.3 TABLET ORAL at 17:40

## 2023-05-29 RX ADMIN — Medication 3 MILLILITER(S): at 09:00

## 2023-05-29 RX ADMIN — Medication 200 MICROGRAM(S): at 05:13

## 2023-05-29 RX ADMIN — TAMSULOSIN HYDROCHLORIDE 0.4 MILLIGRAM(S): 0.4 CAPSULE ORAL at 21:48

## 2023-05-29 RX ADMIN — DABRAFENIB 150 MILLIGRAM(S): 75 CAPSULE ORAL at 17:41

## 2023-05-29 RX ADMIN — Medication 12.5 MILLIGRAM(S): at 05:13

## 2023-05-29 RX ADMIN — PANTOPRAZOLE SODIUM 40 MILLIGRAM(S): 20 TABLET, DELAYED RELEASE ORAL at 05:13

## 2023-05-29 RX ADMIN — Medication 3 MILLILITER(S): at 20:45

## 2023-05-29 RX ADMIN — MIRTAZAPINE 15 MILLIGRAM(S): 45 TABLET, ORALLY DISINTEGRATING ORAL at 21:48

## 2023-05-29 RX ADMIN — Medication 10 MILLIGRAM(S): at 17:40

## 2023-05-29 RX ADMIN — MONTELUKAST 10 MILLIGRAM(S): 4 TABLET, CHEWABLE ORAL at 11:38

## 2023-05-29 RX ADMIN — Medication 3 MILLILITER(S): at 15:21

## 2023-05-29 RX ADMIN — SODIUM ZIRCONIUM CYCLOSILICATE 10 GRAM(S): 10 POWDER, FOR SUSPENSION ORAL at 05:13

## 2023-05-29 RX ADMIN — Medication 10 MILLIGRAM(S): at 05:12

## 2023-05-29 NOTE — PROGRESS NOTE ADULT - ATTENDING COMMENTS
62 yo M with hx of Thyroid cancer with mets, Recent RLE DVT (on Lovenox started 3 weeks ago at [facility] [place] Kyphoplasty presents to ED for with a complaint of Left thigh pain x 1 week.     Left thigh pain 2/2 left thigh hematoma   Acute blood loss anemia s/p 1 unit pRBC   - no signs of compartment syndrome   - CT shows Intramuscular hematoma within the rectus femoris measuring 14.9 x 5 x 1.9 cm. Layering hyperdense material within the hematoma likely reflecting acute blood products.  Partially visualized intermediate density collections within the left lower abdominal wall, likely also reflecting blood products.  - s/p evaluated by surgery in ED --> no acute surgical intervention.  - Hold AC  - vascular surgery consulted for IVC filter - not a candidate since no DVT on LE duplex   - monitor CBC and transfuse to keep hgb above 7.0  - pain control and compression to left thigh    Recent RLE DVT   - venous duplex LE negative     Hyperkalemia, mild   - low k diet   - resolved     COPD   - not in exacerbation.   - c/w inhalers     Thyroid cancer with mets   - follows  at [facility] [place]   - patient is on chronic prednisone 10mg BID and Bactrim for ppx     Hypothyroidism - on synthroid.   HTN - c/w home med  DM II - monitor FS AC HS. Insulin regimen.      DVT ppx: SCD  GI ppx: PPI       Dispo: Monitor hgb, PT eval  Plan of are d/w patient, daughter and wife

## 2023-05-29 NOTE — PROGRESS NOTE ADULT - ASSESSMENT
63y Male with PMH of thyroid cancer with mets, paralyzed phernic nerve and diaphragm, COPD on , Hypertension, Diabetes, h/o of kyphoplast, h/o recent RLE DVT on lovenox presents from home with inability to ambulate    #L rectus femoris hematoma  #Acute Anemia  - Active T + S  - lactate 1.6  - surgery recs no surgical intervention  - CBC q6h  - avoid AC  - monitor off AC  - consult vasc for IVC filter eval    #Thyroid CA with mets  - followup OP  - lytic lesions in b/l kidneys on CTAP  - non-formulary trametinib, dabrafenib  - percocet PRN  - allergic to dilaudid and morphine. Causes decreased respiratory drive    #Paralyzed phrenic nerve  #COPD  - c/w albuteol PRN, symbicort, duonebs  - not in exacerbation    #HTN  - c/w metoprolol xl    #HLD  - start lipitor    #Misc  DVT Prophylaxis: None  Diet: DASH  GI Prophylaxis: PPI  Activity: IAT  Code Status: Full  Disposition: Acute   63y Male with PMH of thyroid cancer with mets, paralyzed phernic nerve and diaphragm, COPD on , Hypertension, Diabetes, h/o of kyphoplast, h/o recent RLE DVT on lovenox presents from home with inability to ambulate    #L rectus femoris hematoma  #Acute Anemia  #Recent DVT (~3wks prior to admission)  - Hgb downtrended to 6.5 (5/28) - completed 1u pRBCs, now 7.4  - CT LLE (5/27): Intramuscular hematoma within the rectus femoris measuring 14.9 x 5 x 1.9cm  *****  - surgery recs no surgical intervention  - Vacular Sx on board - will discuss possible IVC filter   - Active T + S  - Holding home AC  - PT/OT    #Thyroid CA with mets  - lytic lesions in b/l kidneys on CTAP  - C/w non-formulary trametinib, dabrafenib  - percocet PRN (allergic to dilaudid and morphine. Causes decreased respiratory drive)  - followup OP    #Paralyzed phrenic nerve  #COPD  - c/w albuteol PRN, symbicort, duonebs  - not in exacerbation    #HTN  - c/w metoprolol xl    #HLD  - start lipitor    #Misc  DVT Prophylaxis: None  Diet: DASH  GI Prophylaxis: PPI  Activity: IAT  Code Status: Full  Disposition: Acute

## 2023-05-29 NOTE — PROGRESS NOTE ADULT - SUBJECTIVE AND OBJECTIVE BOX
VASCULAR SURGERY PROGRESS NOTE    Patient: JOAQUIN HERNANDEZ , 63y ([birth year])Male   MRN: [id number]  Location: [address]  Visit: 05-27-23 Inpatient  Date: 05-29-23 @ 19:21    Admission: Other injury of unspecified body region, initial encounter    24 Hour Events:  Patient reports significant improvement in his pain and was able to walk to the bathroom and back without issue.     Vitals:  T(F): 99.2 (05-29-23 @ 17:50), Max: 99.8 (05-28-23 @ 21:14)  HR: 109 (05-29-23 @ 17:50)  BP: 92/61 (05-29-23 @ 17:50)  RR: 18 (05-29-23 @ 17:50)  SpO2: --    Diet, DASH/TLC:   Sodium & Cholesterol Restricted  No Concentrated Potassium    Fluids:   I & O's:    PHYSICAL EXAM:  General: NAD  Cardiac: RRR  Respiratory: unlabored breathing at rest  Abdomen: Soft, non-distended, non-tender, no rebound, no guarding.  Musculoskeletal: FROM in b/l UE and LE  Neuro: Sensation grossly intact and equal throughout, no focal deficits  Skin: Warm/dry, normal color, no jaundice  Incision/wound: Bruising noted on Left inner thigh, swelling decreased from prior    MEDICATIONS  (STANDING):  albuterol/ipratropium for Nebulization 3 milliLiter(s) Nebulizer every 6 hours  atorvastatin 10 milliGRAM(s) Oral at bedtime  budesonide 160 MICROgram(s)/formoterol 4.5 MICROgram(s) Inhaler 2 Puff(s) Inhalation two times a day  dabrafenib 150 milliGRAM(s) Oral every 12 hours  levothyroxine 200 MICROGram(s) Oral daily  magnesium oxide 400 milliGRAM(s) Oral three times a day with meals  metoprolol tartrate 12.5 milliGRAM(s) Oral two times a day  mirtazapine 15 milliGRAM(s) Oral at bedtime  montelukast 10 milliGRAM(s) Oral daily  pantoprazole    Tablet 40 milliGRAM(s) Oral before breakfast  predniSONE   Tablet 10 milliGRAM(s) Oral two times a day  senna 2 Tablet(s) Oral at bedtime  tamsulosin 0.4 milliGRAM(s) Oral at bedtime  trametinib 2 milliGRAM(s) Oral daily  trimethoprim  160 mG/sulfamethoxazole 800 mG 1 Tablet(s) Oral <User Schedule>    MEDICATIONS  (PRN):  albuterol    90 MICROgram(s) HFA Inhaler 2 Puff(s) Inhalation every 6 hours PRN Shortness of Breath and/or Wheezing  ondansetron    Tablet 4 milliGRAM(s) Oral every 8 hours PRN Nausea and/or Vomiting  oxycodone    5 mG/acetaminophen 325 mG 2 Tablet(s) Oral every 4 hours PRN Moderate Pain (4 - 6)    DVT PROPHYLAXIS:   GI PROPHYLAXIS: pantoprazole    Tablet 40 milliGRAM(s) Oral before breakfast    ANTICOAGULATION:   ANTIBIOTICS:  trimethoprim  160 mG/sulfamethoxazole 800 mG 1 Tablet(s)    LAB/STUDIES:  Labs:  CAPILLARY BLOOD GLUCOSE                7.4    11.06 )-----------( 235      ( 29 May 2023 07:00 )             22.8         05-29    135  |  100  |  14  ----------------------------<  110<H>  4.8   |  26  |  0.7    Calcium, Total Serum: 8.6 mg/dL (05-29-23 @ 07:00)    LFTs:             5.0  | 0.5  | 15       ------------------[176     ( 28 May 2023 11:00 )  3.0  | x    | 18          Lipase:x      Amylase:x         Lactate, Blood: 1.3 mmol/L (05-28-23 @ 11:00)  Lactate, Blood: 1.6 mmol/L (05-27-23 @ 16:30)    Coags:    CARDIAC MARKERS ( 28 May 2023 11:00 )  x     / x     / 249 U/L / x     / x

## 2023-05-29 NOTE — PROGRESS NOTE ADULT - SUBJECTIVE AND OBJECTIVE BOX
GENERAL SURGERY PROGRESS NOTE    Patient: JOAQUIN HERNANDEZ , 63y ([birth year])Male   MRN: [id number]  Location: [address]   Visit: 05-27-23 Inpatient  Date: 05-29-23 @ 01:22      Events of past 24 hours:    PAST MEDICAL & SURGICAL HISTORY:  Thyroid cancer      HTN (hypertension)      COPD (chronic obstructive pulmonary disease)      Borderline diabetes      H/O thyroidectomy          Vitals:   T(F): 99.8 (05-28-23 @ 21:14), Max: 99.8 (05-28-23 @ 21:14)  HR: 105 (05-28-23 @ 21:14)  BP: 121/78 (05-28-23 @ 21:14)  RR: 18 (05-28-23 @ 13:37)  SpO2: 97% (05-28-23 @ 04:30)      Diet, DASH/TLC:   Sodium & Cholesterol Restricted  No Concentrated Potassium      Fluids:     I & O's:    PHYSICAL EXAM:  General: NAD, AAOx3, calm and cooperative  HEENT: NCAT, Trachea ML, Neck supple  Cardiac: RRR S1, S2  Respiratory: Chest rise equal bilaterally, no labored breathing  Abdomen: Soft, non-distended, non-tender  Extremities: BARRIOS, compartments soft, tenderness to LLE, ecchymosis present on left medial thigh   Musculoskeletal: Strength 5/5 BL UE/LE, ROM intact, compartments soft  Vascular: Pulses 2+ throughout, extremities well perfused  Skin: Warm/dry, normal color      MEDICATIONS  (STANDING):  albuterol/ipratropium for Nebulization 3 milliLiter(s) Nebulizer every 6 hours  atorvastatin 10 milliGRAM(s) Oral at bedtime  budesonide 160 MICROgram(s)/formoterol 4.5 MICROgram(s) Inhaler 2 Puff(s) Inhalation two times a day  dabrafenib 150 milliGRAM(s) Oral every 12 hours  levothyroxine 200 MICROGram(s) Oral daily  metoprolol tartrate 12.5 milliGRAM(s) Oral two times a day  mirtazapine 15 milliGRAM(s) Oral at bedtime  montelukast 10 milliGRAM(s) Oral daily  pantoprazole    Tablet 40 milliGRAM(s) Oral before breakfast  predniSONE   Tablet 10 milliGRAM(s) Oral two times a day  senna 2 Tablet(s) Oral at bedtime  sodium zirconium cyclosilicate 10 Gram(s) Oral three times a day  tamsulosin 0.4 milliGRAM(s) Oral at bedtime  trametinib 2 milliGRAM(s) Oral daily  trimethoprim  160 mG/sulfamethoxazole 800 mG 1 Tablet(s) Oral <User Schedule>    MEDICATIONS  (PRN):  albuterol    90 MICROgram(s) HFA Inhaler 2 Puff(s) Inhalation every 6 hours PRN Shortness of Breath and/or Wheezing  ondansetron    Tablet 4 milliGRAM(s) Oral every 8 hours PRN Nausea and/or Vomiting  oxycodone    5 mG/acetaminophen 325 mG 2 Tablet(s) Oral every 4 hours PRN Moderate Pain (4 - 6)      DVT PROPHYLAXIS:   GI PROPHYLAXIS: pantoprazole    Tablet 40 milliGRAM(s) Oral before breakfast    ANTICOAGULATION:   ANTIBIOTICS:  trimethoprim  160 mG/sulfamethoxazole 800 mG 1 Tablet(s)            LAB/STUDIES:  Labs:  CAPILLARY BLOOD GLUCOSE                              7.4    10.76 )-----------( 226      ( 28 May 2023 22:55 )             22.7       Auto Neutrophil %: 76.2 % (05-28-23 @ 11:00)  Auto Immature Granulocyte %: 1.1 % (05-28-23 @ 11:00)    05-28    133<L>  |  99  |  15  ----------------------------<  101<H>  4.7   |  25  |  0.7      Calcium, Total Serum: 8.5 mg/dL (05-28-23 @ 22:55)      LFTs:             5.0  | 0.5  | 15       ------------------[176     ( 28 May 2023 11:00 )  3.0  | x    | 18          Lipase:x      Amylase:x         Lactate, Blood: 1.3 mmol/L (05-28-23 @ 11:00)  Lactate, Blood: 1.6 mmol/L (05-27-23 @ 16:30)      Coags:     12.00  ----< 1.05    ( 27 May 2023 16:30 )     30.8        CARDIAC MARKERS ( 28 May 2023 11:00 )  x     / x     / 249 U/L / x     / x

## 2023-05-29 NOTE — PROGRESS NOTE ADULT - SUBJECTIVE AND OBJECTIVE BOX
JOAQUIN HERNANDEZ 63y Male  MRN#: [id number]     Hospital Day: 2d    CC:  Other injury of unspecified body region, initial encounter    HOSPITAL COURSE:   63M. PMH: thyroid cancer with mets (on trametinib, dabrafenib), h/o of kyphoplasty, h/o recent RLE DVT on lovenox   Presented 5/27 from home with inability to ambulate. Patient reports he started to experience intermittent pain in L thigh and went for CT and duplex today. When came back, started to experience unbearable pain in L high and observed some bruising limiting his mobility.    VS WNL  Labs notable for hgb 7.5 (11.6 in 04/2023), Na 131, K 5.4 hemolyzed (rpt WNL)  CTAP/CT thigh showed hematoma in L rectus femoris    Admitted to SDU for L thigh hematoma  Hgb downtrended to 6.5 - completed 1u pRBCs, now 7.4  Vascular on board - will re-evaluate his IVC filter. Surgery following - no acute intervention.     SUBJECTIVE     Overnight events  None    Subjective complaints                                               ----------------------------------------------------------  OBJECTIVE  PAST MEDICAL & SURGICAL HISTORY  Thyroid cancer    HTN (hypertension)    COPD (chronic obstructive pulmonary disease)    Borderline diabetes    H/O thyroidectomy                                              -----------------------------------------------------------  ALLERGIES:  Allergy Status Unknown                                            ------------------------------------------------------------    HOME MEDICATIONS  Home Medications:  Advair Diskus: 2 puff(s) inhaled 2 times a day (07 Feb 2020 07:22)  Bactrim  mg-160 mg oral tablet: 1 tab(s) orally Monday, Wednesday, and Friday (28 May 2023 02:01)  dabrafenib 75 mg oral capsule: 2 orally 2 times a day (20 Apr 2023 03:36)  esomeprazole 40 mg oral delayed release capsule: 1 tab(s) orally once a day (28 May 2023 01:59)  levothyroxine 200 mcg (0.2 mg) oral capsule: 1 cap(s) orally once a day (28 May 2023 02:02)  mirtazapine 15 mg oral tablet: 1 tab(s) orally once a day (at bedtime) (28 May 2023 01:59)  pravastatin 40 mg oral tablet: 1 tab(s) orally once a day (23 Nov 2019 18:22)  predniSONE 10 mg oral tablet: 1 orally 2 times a day (20 Apr 2023 03:35)  Singulair 10 mg oral tablet: 1 tab(s) orally once a day (23 Nov 2019 18:22)  Spiriva 18 mcg inhalation capsule: 1 cap(s) inhaled once a day (23 Nov 2019 18:22)  Toprol-XL 25 mg oral tablet, extended release: 1 tab(s) orally once a day (23 Nov 2019 18:22)  trametinib 2 mg oral tablet: 1 tab(s) orally once a day (28 May 2023 02:01)  Ventolin HFA 90 mcg/inh inhalation aerosol: 2 puff(s) inhaled 4 times a day (23 Nov 2019 18:22)                           MEDICATIONS:  STANDING MEDICATIONS  albuterol/ipratropium for Nebulization 3 milliLiter(s) Nebulizer every 6 hours  atorvastatin 10 milliGRAM(s) Oral at bedtime  budesonide 160 MICROgram(s)/formoterol 4.5 MICROgram(s) Inhaler 2 Puff(s) Inhalation two times a day  dabrafenib 150 milliGRAM(s) Oral every 12 hours  levothyroxine 200 MICROGram(s) Oral daily  metoprolol tartrate 12.5 milliGRAM(s) Oral two times a day  mirtazapine 15 milliGRAM(s) Oral at bedtime  montelukast 10 milliGRAM(s) Oral daily  pantoprazole    Tablet 40 milliGRAM(s) Oral before breakfast  predniSONE   Tablet 10 milliGRAM(s) Oral two times a day  senna 2 Tablet(s) Oral at bedtime  tamsulosin 0.4 milliGRAM(s) Oral at bedtime  trametinib 2 milliGRAM(s) Oral daily  trimethoprim  160 mG/sulfamethoxazole 800 mG 1 Tablet(s) Oral <User Schedule>    PRN MEDICATIONS  albuterol    90 MICROgram(s) HFA Inhaler 2 Puff(s) Inhalation every 6 hours PRN  ondansetron    Tablet 4 milliGRAM(s) Oral every 8 hours PRN  oxycodone    5 mG/acetaminophen 325 mG 2 Tablet(s) Oral every 4 hours PRN                                            ------------------------------------------------------------  VITAL SIGNS: Last 24 Hours  T(C): 36.7 (29 May 2023 04:31), Max: 37.7 (28 May 2023 21:14)  T(F): 98 (29 May 2023 04:31), Max: 99.8 (28 May 2023 21:14)  HR: 99 (29 May 2023 04:31) (99 - 105)  BP: 122/56 (29 May 2023 04:31) (114/79 - 122/56)  BP(mean): --  RR: 18 (29 May 2023 04:31) (18 - 18)  SpO2: --                                             --------------------------------------------------------------  LABS:                        7.4    10.76 )-----------( 226      ( 28 May 2023 22:55 )             22.7     05-28    133<L>  |  99  |  15  ----------------------------<  101<H>  4.7   |  25  |  0.7    Ca    8.5      28 May 2023 22:55  Mg     1.6     05-28    TPro  5.0<L>  /  Alb  3.0<L>  /  TBili  0.5  /  DBili  x   /  AST  15  /  ALT  18  /  AlkPhos  176<H>  05-28    PT/INR - ( 27 May 2023 16:30 )   PT: 12.00 sec;   INR: 1.05 ratio         PTT - ( 27 May 2023 16:30 )  PTT:30.8 sec      Creatine Kinase, Serum: 249 U/L *H* (05-28-23 @ 11:00)  Lactate, Blood: 1.3 mmol/L (05-28-23 @ 11:00)          CARDIAC MARKERS ( 28 May 2023 11:00 )  x     / x     / 249 U/L / x     / x                                                  -------------------------------------------------------------  RADIOLOGY:                                            --------------------------------------------------------------    PHYSICAL EXAM:                                             --------------------------------------------------------------                 JOAQUIN HERNANDEZ 63y Male  MRN#: [id number]     Hospital Day: 2d    CC:  Other injury of unspecified body region, initial encounter    HOSPITAL COURSE:   63M. PMH: thyroid cancer with mets (on trametinib, dabrafenib), h/o of kyphoplasty, h/o recent RLE DVT on lovenox   Presented 5/27 from home with inability to ambulate. Patient reports he started to experience intermittent pain in L thigh and went for CT and duplex today. When came back, started to experience unbearable pain in L high and observed some bruising limiting his mobility.    VS WNL  Labs notable for hgb 7.5 (11.6 in 04/2023), Na 131, K 5.4 hemolyzed (rpt WNL)  CTAP/CT thigh showed hematoma in L rectus femoris    Admitted to SDU for L thigh hematoma  Hgb downtrended to 6.5 - completed 1u pRBCs, now 7.4  Vascular on board - will re-evaluate his IVC filter. Surgery following - no acute intervention.     SUBJECTIVE     Overnight events  None    Subjective complaints   Pt evaluated at bedside. No active complaints. Reports pain is improved today, would like to try walking                                            ----------------------------------------------------------  OBJECTIVE  PAST MEDICAL & SURGICAL HISTORY  Thyroid cancer    HTN (hypertension)    COPD (chronic obstructive pulmonary disease)    Borderline diabetes    H/O thyroidectomy                                              -----------------------------------------------------------  ALLERGIES:  Allergy Status Unknown                                            ------------------------------------------------------------    HOME MEDICATIONS  Home Medications:  Advair Diskus: 2 puff(s) inhaled 2 times a day (07 Feb 2020 07:22)  Bactrim  mg-160 mg oral tablet: 1 tab(s) orally Monday, Wednesday, and Friday (28 May 2023 02:01)  dabrafenib 75 mg oral capsule: 2 orally 2 times a day (20 Apr 2023 03:36)  esomeprazole 40 mg oral delayed release capsule: 1 tab(s) orally once a day (28 May 2023 01:59)  levothyroxine 200 mcg (0.2 mg) oral capsule: 1 cap(s) orally once a day (28 May 2023 02:02)  mirtazapine 15 mg oral tablet: 1 tab(s) orally once a day (at bedtime) (28 May 2023 01:59)  pravastatin 40 mg oral tablet: 1 tab(s) orally once a day (23 Nov 2019 18:22)  predniSONE 10 mg oral tablet: 1 orally 2 times a day (20 Apr 2023 03:35)  Singulair 10 mg oral tablet: 1 tab(s) orally once a day (23 Nov 2019 18:22)  Spiriva 18 mcg inhalation capsule: 1 cap(s) inhaled once a day (23 Nov 2019 18:22)  Toprol-XL 25 mg oral tablet, extended release: 1 tab(s) orally once a day (23 Nov 2019 18:22)  trametinib 2 mg oral tablet: 1 tab(s) orally once a day (28 May 2023 02:01)  Ventolin HFA 90 mcg/inh inhalation aerosol: 2 puff(s) inhaled 4 times a day (23 Nov 2019 18:22)                           MEDICATIONS:  STANDING MEDICATIONS  albuterol/ipratropium for Nebulization 3 milliLiter(s) Nebulizer every 6 hours  atorvastatin 10 milliGRAM(s) Oral at bedtime  budesonide 160 MICROgram(s)/formoterol 4.5 MICROgram(s) Inhaler 2 Puff(s) Inhalation two times a day  dabrafenib 150 milliGRAM(s) Oral every 12 hours  levothyroxine 200 MICROGram(s) Oral daily  metoprolol tartrate 12.5 milliGRAM(s) Oral two times a day  mirtazapine 15 milliGRAM(s) Oral at bedtime  montelukast 10 milliGRAM(s) Oral daily  pantoprazole    Tablet 40 milliGRAM(s) Oral before breakfast  predniSONE   Tablet 10 milliGRAM(s) Oral two times a day  senna 2 Tablet(s) Oral at bedtime  tamsulosin 0.4 milliGRAM(s) Oral at bedtime  trametinib 2 milliGRAM(s) Oral daily  trimethoprim  160 mG/sulfamethoxazole 800 mG 1 Tablet(s) Oral <User Schedule>    PRN MEDICATIONS  albuterol    90 MICROgram(s) HFA Inhaler 2 Puff(s) Inhalation every 6 hours PRN  ondansetron    Tablet 4 milliGRAM(s) Oral every 8 hours PRN  oxycodone    5 mG/acetaminophen 325 mG 2 Tablet(s) Oral every 4 hours PRN                                            ------------------------------------------------------------  VITAL SIGNS: Last 24 Hours  T(C): 36.7 (29 May 2023 04:31), Max: 37.7 (28 May 2023 21:14)  T(F): 98 (29 May 2023 04:31), Max: 99.8 (28 May 2023 21:14)  HR: 99 (29 May 2023 04:31) (99 - 105)  BP: 122/56 (29 May 2023 04:31) (114/79 - 122/56)  BP(mean): --  RR: 18 (29 May 2023 04:31) (18 - 18)  SpO2: --                                             --------------------------------------------------------------  LABS:                        7.4    10.76 )-----------( 226      ( 28 May 2023 22:55 )             22.7     05-28    133<L>  |  99  |  15  ----------------------------<  101<H>  4.7   |  25  |  0.7    Ca    8.5      28 May 2023 22:55  Mg     1.6     05-28    TPro  5.0<L>  /  Alb  3.0<L>  /  TBili  0.5  /  DBili  x   /  AST  15  /  ALT  18  /  AlkPhos  176<H>  05-28    PT/INR - ( 27 May 2023 16:30 )   PT: 12.00 sec;   INR: 1.05 ratio         PTT - ( 27 May 2023 16:30 )  PTT:30.8 sec      Creatine Kinase, Serum: 249 U/L *H* (05-28-23 @ 11:00)  Lactate, Blood: 1.3 mmol/L (05-28-23 @ 11:00)          CARDIAC MARKERS ( 28 May 2023 11:00 )  x     / x     / 249 U/L / x     / x                                                  -------------------------------------------------------------  RADIOLOGY:                                            --------------------------------------------------------------    PHYSICAL EXAM:  GEN: NAD  NEURO: Alert & Orientedx4,   HEENT: nontraumatic, normocephalic, neck supple  CARD: S1, S2 audible, no S3, regular rate and rhythm, no murmur  PULM: B/L breath sounds, no wheezing, crackles, or rales  ABD: Soft, nondistended, nontender  EXTR: tenderness to LLE, ecchymosis present on left medial thigh                                           --------------------------------------------------------------

## 2023-05-29 NOTE — PROGRESS NOTE ADULT - ASSESSMENT
63yM w/ PMHx of thyroid cancer with mets, h/o kyphoplasty, recent DVT in RLE on lovenox presents to the ED for atraumatic left thigh pain that began a few days ago now with left thigh hematoma. Physical exam findings, imaging, and labs as documented above.  Vascular surgery consulted for evaluation of IVC filter    PLAN:    - Repeat venous duplex shows no evidence of DVT   - Patient may remain off of anticoagulation   - Encourage OOB   - Repeat venous duplex in 2 weeks with outpatient follow up with vascular surgery ([phone], Dr. Poon)   - Please recall as needed    Vascular Surgery  [id number]   63yM w/ PMHx of thyroid cancer with mets, h/o kyphoplasty, recent DVT in RLE on lovenox presents to the ED for atraumatic left thigh pain that began a few days ago now with left thigh hematoma. Physical exam findings, imaging, and labs as documented above.  Vascular surgery consulted for evaluation of IVC filter    PLAN:    - Repeat venous duplex shows no evidence of DVT   - Patient may remain off of anticoagulation, recommend DVT ppx while inpatient   - Encourage OOB   - Repeat venous duplex in 2 weeks with outpatient follow up with vascular surgery ([phone], Dr. Poon)   - Please recall as needed    Vascular Surgery  [facility] [id number]

## 2023-05-30 LAB
ALBUMIN SERPL ELPH-MCNC: 3.1 G/DL — LOW (ref 3.5–5.2)
ALP SERPL-CCNC: 179 U/L — HIGH (ref 30–115)
ALT FLD-CCNC: 14 U/L — SIGNIFICANT CHANGE UP (ref 0–41)
ANION GAP SERPL CALC-SCNC: 9 MMOL/L — SIGNIFICANT CHANGE UP (ref 7–14)
AST SERPL-CCNC: 20 U/L — SIGNIFICANT CHANGE UP (ref 0–41)
BASOPHILS # BLD AUTO: 0.02 K/UL — SIGNIFICANT CHANGE UP (ref 0–0.2)
BASOPHILS # BLD AUTO: 0.03 K/UL — SIGNIFICANT CHANGE UP (ref 0–0.2)
BASOPHILS NFR BLD AUTO: 0.2 % — SIGNIFICANT CHANGE UP (ref 0–1)
BASOPHILS NFR BLD AUTO: 0.3 % — SIGNIFICANT CHANGE UP (ref 0–1)
BILIRUB SERPL-MCNC: 0.3 MG/DL — SIGNIFICANT CHANGE UP (ref 0.2–1.2)
BLD GP AB SCN SERPL QL: SIGNIFICANT CHANGE UP
BUN SERPL-MCNC: 18 MG/DL — SIGNIFICANT CHANGE UP (ref 10–20)
CALCIUM SERPL-MCNC: 8.7 MG/DL — SIGNIFICANT CHANGE UP (ref 8.4–10.5)
CHLORIDE SERPL-SCNC: 100 MMOL/L — SIGNIFICANT CHANGE UP (ref 98–110)
CO2 SERPL-SCNC: 31 MMOL/L — SIGNIFICANT CHANGE UP (ref 17–32)
CREAT SERPL-MCNC: 0.7 MG/DL — SIGNIFICANT CHANGE UP (ref 0.7–1.5)
EGFR: 104 ML/MIN/1.73M2 — SIGNIFICANT CHANGE UP
EOSINOPHIL # BLD AUTO: 0.08 K/UL — SIGNIFICANT CHANGE UP (ref 0–0.7)
EOSINOPHIL # BLD AUTO: 0.24 K/UL — SIGNIFICANT CHANGE UP (ref 0–0.7)
EOSINOPHIL NFR BLD AUTO: 0.9 % — SIGNIFICANT CHANGE UP (ref 0–8)
EOSINOPHIL NFR BLD AUTO: 2.3 % — SIGNIFICANT CHANGE UP (ref 0–8)
GLUCOSE SERPL-MCNC: 129 MG/DL — HIGH (ref 70–99)
HCT VFR BLD CALC: 22.9 % — LOW (ref 42–52)
HCT VFR BLD CALC: 25.6 % — LOW (ref 42–52)
HGB BLD-MCNC: 7.1 G/DL — LOW (ref 14–18)
HGB BLD-MCNC: 8.1 G/DL — LOW (ref 14–18)
IMM GRANULOCYTES NFR BLD AUTO: 1 % — HIGH (ref 0.1–0.3)
IMM GRANULOCYTES NFR BLD AUTO: 1.3 % — HIGH (ref 0.1–0.3)
LYMPHOCYTES # BLD AUTO: 1.23 K/UL — SIGNIFICANT CHANGE UP (ref 1.2–3.4)
LYMPHOCYTES # BLD AUTO: 1.87 K/UL — SIGNIFICANT CHANGE UP (ref 1.2–3.4)
LYMPHOCYTES # BLD AUTO: 13.4 % — LOW (ref 20.5–51.1)
LYMPHOCYTES # BLD AUTO: 18.1 % — LOW (ref 20.5–51.1)
MAGNESIUM SERPL-MCNC: 2 MG/DL — SIGNIFICANT CHANGE UP (ref 1.8–2.4)
MCHC RBC-ENTMCNC: 26.6 PG — LOW (ref 27–31)
MCHC RBC-ENTMCNC: 27 PG — SIGNIFICANT CHANGE UP (ref 27–31)
MCHC RBC-ENTMCNC: 31 G/DL — LOW (ref 32–37)
MCHC RBC-ENTMCNC: 31.6 G/DL — LOW (ref 32–37)
MCV RBC AUTO: 85.3 FL — SIGNIFICANT CHANGE UP (ref 80–94)
MCV RBC AUTO: 85.8 FL — SIGNIFICANT CHANGE UP (ref 80–94)
MONOCYTES # BLD AUTO: 1 K/UL — HIGH (ref 0.1–0.6)
MONOCYTES # BLD AUTO: 1.2 K/UL — HIGH (ref 0.1–0.6)
MONOCYTES NFR BLD AUTO: 10.9 % — HIGH (ref 1.7–9.3)
MONOCYTES NFR BLD AUTO: 11.6 % — HIGH (ref 1.7–9.3)
NEUTROPHILS # BLD AUTO: 6.77 K/UL — HIGH (ref 1.4–6.5)
NEUTROPHILS # BLD AUTO: 6.88 K/UL — HIGH (ref 1.4–6.5)
NEUTROPHILS NFR BLD AUTO: 66.4 % — SIGNIFICANT CHANGE UP (ref 42.2–75.2)
NEUTROPHILS NFR BLD AUTO: 73.6 % — SIGNIFICANT CHANGE UP (ref 42.2–75.2)
NRBC # BLD: 0 /100 WBCS — SIGNIFICANT CHANGE UP (ref 0–0)
NRBC # BLD: 0 /100 WBCS — SIGNIFICANT CHANGE UP (ref 0–0)
PLATELET # BLD AUTO: 235 K/UL — SIGNIFICANT CHANGE UP (ref 130–400)
PLATELET # BLD AUTO: 247 K/UL — SIGNIFICANT CHANGE UP (ref 130–400)
PMV BLD: 10.4 FL — SIGNIFICANT CHANGE UP (ref 7.4–10.4)
PMV BLD: 10.5 FL — HIGH (ref 7.4–10.4)
POTASSIUM SERPL-MCNC: 4.5 MMOL/L — SIGNIFICANT CHANGE UP (ref 3.5–5)
POTASSIUM SERPL-SCNC: 4.5 MMOL/L — SIGNIFICANT CHANGE UP (ref 3.5–5)
PROT SERPL-MCNC: 5.3 G/DL — LOW (ref 6–8)
RBC # BLD: 2.67 M/UL — LOW (ref 4.7–6.1)
RBC # BLD: 3 M/UL — LOW (ref 4.7–6.1)
RBC # FLD: 18.6 % — HIGH (ref 11.5–14.5)
RBC # FLD: 19 % — HIGH (ref 11.5–14.5)
SODIUM SERPL-SCNC: 140 MMOL/L — SIGNIFICANT CHANGE UP (ref 135–146)
WBC # BLD: 10.35 K/UL — SIGNIFICANT CHANGE UP (ref 4.8–10.8)
WBC # BLD: 9.19 K/UL — SIGNIFICANT CHANGE UP (ref 4.8–10.8)
WBC # FLD AUTO: 10.35 K/UL — SIGNIFICANT CHANGE UP (ref 4.8–10.8)
WBC # FLD AUTO: 9.19 K/UL — SIGNIFICANT CHANGE UP (ref 4.8–10.8)

## 2023-05-30 PROCEDURE — 99232 SBSQ HOSP IP/OBS MODERATE 35: CPT

## 2023-05-30 RX ORDER — POLYETHYLENE GLYCOL 3350 17 G/17G
17 POWDER, FOR SOLUTION ORAL DAILY
Refills: 0 | Status: DISCONTINUED | OUTPATIENT
Start: 2023-05-30 | End: 2023-06-03

## 2023-05-30 RX ORDER — ENOXAPARIN SODIUM 100 MG/ML
40 INJECTION SUBCUTANEOUS EVERY 24 HOURS
Refills: 0 | Status: DISCONTINUED | OUTPATIENT
Start: 2023-05-30 | End: 2023-06-03

## 2023-05-30 RX ADMIN — Medication 12.5 MILLIGRAM(S): at 06:08

## 2023-05-30 RX ADMIN — TAMSULOSIN HYDROCHLORIDE 0.4 MILLIGRAM(S): 0.4 CAPSULE ORAL at 21:16

## 2023-05-30 RX ADMIN — BUDESONIDE AND FORMOTEROL FUMARATE DIHYDRATE 2 PUFF(S): 160; 4.5 AEROSOL RESPIRATORY (INHALATION) at 21:15

## 2023-05-30 RX ADMIN — Medication 10 MILLIGRAM(S): at 06:09

## 2023-05-30 RX ADMIN — Medication 3 MILLILITER(S): at 08:59

## 2023-05-30 RX ADMIN — Medication 12.5 MILLIGRAM(S): at 17:29

## 2023-05-30 RX ADMIN — MIRTAZAPINE 15 MILLIGRAM(S): 45 TABLET, ORALLY DISINTEGRATING ORAL at 21:15

## 2023-05-30 RX ADMIN — SENNA PLUS 2 TABLET(S): 8.6 TABLET ORAL at 21:16

## 2023-05-30 RX ADMIN — ENOXAPARIN SODIUM 40 MILLIGRAM(S): 100 INJECTION SUBCUTANEOUS at 17:29

## 2023-05-30 RX ADMIN — Medication 3 MILLILITER(S): at 20:08

## 2023-05-30 RX ADMIN — ATORVASTATIN CALCIUM 10 MILLIGRAM(S): 80 TABLET, FILM COATED ORAL at 21:16

## 2023-05-30 RX ADMIN — MAGNESIUM OXIDE 400 MG ORAL TABLET 400 MILLIGRAM(S): 241.3 TABLET ORAL at 11:59

## 2023-05-30 RX ADMIN — POLYETHYLENE GLYCOL 3350 17 GRAM(S): 17 POWDER, FOR SOLUTION ORAL at 12:00

## 2023-05-30 RX ADMIN — Medication 10 MILLIGRAM(S): at 17:30

## 2023-05-30 RX ADMIN — Medication 200 MICROGRAM(S): at 06:08

## 2023-05-30 RX ADMIN — PANTOPRAZOLE SODIUM 40 MILLIGRAM(S): 20 TABLET, DELAYED RELEASE ORAL at 06:09

## 2023-05-30 RX ADMIN — MAGNESIUM OXIDE 400 MG ORAL TABLET 400 MILLIGRAM(S): 241.3 TABLET ORAL at 17:30

## 2023-05-30 RX ADMIN — MONTELUKAST 10 MILLIGRAM(S): 4 TABLET, CHEWABLE ORAL at 11:59

## 2023-05-30 RX ADMIN — MAGNESIUM OXIDE 400 MG ORAL TABLET 400 MILLIGRAM(S): 241.3 TABLET ORAL at 08:28

## 2023-05-30 RX ADMIN — Medication 3 MILLILITER(S): at 14:58

## 2023-05-30 NOTE — PROGRESS NOTE ADULT - SUBJECTIVE AND OBJECTIVE BOX
THORACIC SURGERY PROGRESS NOTE    Patient: JOAQUIN HERNANDEZ , 63y ([birth year])Male   MRN: [id number]  Location: [address]  Visit: 05-27-23 Inpatient  Date: 05-30-23 @ 02:33    Admission: Other injury of unspecified body region, initial encounter    Hospital Day: 4    24 Hour Events:  No acute events. OOB+  Patient reports increased range of movement and decreased leg pain.     Vitals:  T(F): 100 (05-29-23 @ 21:40), Max: 100 (05-29-23 @ 21:40)  HR: 110 (05-29-23 @ 21:40)  BP: 125/78 (05-29-23 @ 21:40)  RR: 18 (05-29-23 @ 21:40)  SpO2: 95% (05-29-23 @ 21:40)    Diet, DASH/TLC:   Sodium & Cholesterol Restricted  No Concentrated Potassium    Fluids:   I & O's:    PHYSICAL EXAM:  General: NAD  Cardiac: RRR  Respiratory: unlabored breathing at rest  Abdomen: Soft, non-distended, non-tender, no rebound, no guarding.  Musculoskeletal: FROM in b/l UE and LE, compartments soft  Neuro: Sensation grossly intact and equal throughout, no focal deficits  Skin: Warm/dry, normal color, no jaundice  Incision/wound: Left medial thigh bruising with swelling    MEDICATIONS  (STANDING):  albuterol/ipratropium for Nebulization 3 milliLiter(s) Nebulizer every 6 hours  atorvastatin 10 milliGRAM(s) Oral at bedtime  budesonide 160 MICROgram(s)/formoterol 4.5 MICROgram(s) Inhaler 2 Puff(s) Inhalation two times a day  dabrafenib 150 milliGRAM(s) Oral every 12 hours  levothyroxine 200 MICROGram(s) Oral daily  magnesium oxide 400 milliGRAM(s) Oral three times a day with meals  metoprolol tartrate 12.5 milliGRAM(s) Oral two times a day  mirtazapine 15 milliGRAM(s) Oral at bedtime  montelukast 10 milliGRAM(s) Oral daily  pantoprazole    Tablet 40 milliGRAM(s) Oral before breakfast  predniSONE   Tablet 10 milliGRAM(s) Oral two times a day  senna 2 Tablet(s) Oral at bedtime  tamsulosin 0.4 milliGRAM(s) Oral at bedtime  trametinib 2 milliGRAM(s) Oral daily  trimethoprim  160 mG/sulfamethoxazole 800 mG 1 Tablet(s) Oral <User Schedule>    MEDICATIONS  (PRN):  albuterol    90 MICROgram(s) HFA Inhaler 2 Puff(s) Inhalation every 6 hours PRN Shortness of Breath and/or Wheezing  ondansetron    Tablet 4 milliGRAM(s) Oral every 8 hours PRN Nausea and/or Vomiting  oxycodone    5 mG/acetaminophen 325 mG 2 Tablet(s) Oral every 4 hours PRN Moderate Pain (4 - 6)    DVT PROPHYLAXIS:   GI PROPHYLAXIS: pantoprazole    Tablet 40 milliGRAM(s) Oral before breakfast    ANTICOAGULATION:   ANTIBIOTICS:  trimethoprim  160 mG/sulfamethoxazole 800 mG 1 Tablet(s)    LAB/STUDIES:  Labs:  CAPILLARY BLOOD GLUCOSE                        7.4    11.06 )-----------( 235      ( 29 May 2023 07:00 )             22.8         05-29    135  |  100  |  14  ----------------------------<  110<H>  4.8   |  26  |  0.7      Calcium, Total Serum: 8.6 mg/dL (05-29-23 @ 07:00)      LFTs:             5.0  | 0.5  | 15       ------------------[176     ( 28 May 2023 11:00 )  3.0  | x    | 18          Lipase:x      Amylase:x         Lactate, Blood: 1.3 mmol/L (05-28-23 @ 11:00)  Lactate, Blood: 1.6 mmol/L (05-27-23 @ 16:30)    Coags    CARDIAC MARKERS ( 28 May 2023 11:00 )  x     / x     / 249 U/L / x     / x

## 2023-05-30 NOTE — PROGRESS NOTE ADULT - SUBJECTIVE AND OBJECTIVE BOX
JOAQUIN HERNANDEZ 63y Male  MRN#: [id number]     Hospital Day: 3d    CC:  Other injury of unspecified body region, initial encounter    HOSPITAL COURSE:   63M. PMH: thyroid cancer with mets (Follows at [facility]; on trametinib, dabrafenib), h/o of kyphoplasty 2/ bone mets, h/o recent RLE DVT on lovenox, HTN, COPD, Hypothyroidism   Presented 5/27 from home with inability to ambulate. Patient reports he started to experience intermittent pain in L thigh and went for CT and duplex today. When came back, started to experience unbearable pain in L high and observed some bruising limiting his mobility.    VS WNL  Labs notable for hgb 7.5 (11.6 in 04/2023), Na 131, K 5.4 hemolyzed (rpt WNL)  CTAP/CT thigh showed hematoma in L rectus femoris    Admitted to SDU for L thigh hematoma  Hgb downtrended to 6.5 - completed 1u pRBCs, now 7.4  Vascular on board, was considering IVC filter, but pt not a candidate since no DVT on LE duplex . Surgery following - no acute intervention.     SUBJECTIVE     Overnight events  None    Subjective complaints                                               ----------------------------------------------------------  OBJECTIVE  PAST MEDICAL & SURGICAL HISTORY  Thyroid cancer    HTN (hypertension)    COPD (chronic obstructive pulmonary disease)    Borderline diabetes    H/O thyroidectomy                                              -----------------------------------------------------------  ALLERGIES:  Allergy Status Unknown                                            ------------------------------------------------------------    HOME MEDICATIONS  Home Medications:  Advair Diskus: 2 puff(s) inhaled 2 times a day (07 Feb 2020 07:22)  Bactrim  mg-160 mg oral tablet: 1 tab(s) orally Monday, Wednesday, and Friday (28 May 2023 02:01)  dabrafenib 75 mg oral capsule: 2 orally 2 times a day (20 Apr 2023 03:36)  esomeprazole 40 mg oral delayed release capsule: 1 tab(s) orally once a day (28 May 2023 01:59)  levothyroxine 200 mcg (0.2 mg) oral capsule: 1 cap(s) orally once a day (28 May 2023 02:02)  mirtazapine 15 mg oral tablet: 1 tab(s) orally once a day (at bedtime) (28 May 2023 01:59)  pravastatin 40 mg oral tablet: 1 tab(s) orally once a day (23 Nov 2019 18:22)  predniSONE 10 mg oral tablet: 1 orally 2 times a day (20 Apr 2023 03:35)  Singulair 10 mg oral tablet: 1 tab(s) orally once a day (23 Nov 2019 18:22)  Spiriva 18 mcg inhalation capsule: 1 cap(s) inhaled once a day (23 Nov 2019 18:22)  Toprol-XL 25 mg oral tablet, extended release: 1 tab(s) orally once a day (23 Nov 2019 18:22)  trametinib 2 mg oral tablet: 1 tab(s) orally once a day (28 May 2023 02:01)  Ventolin HFA 90 mcg/inh inhalation aerosol: 2 puff(s) inhaled 4 times a day (23 Nov 2019 18:22)                           MEDICATIONS:  STANDING MEDICATIONS  albuterol/ipratropium for Nebulization 3 milliLiter(s) Nebulizer every 6 hours  atorvastatin 10 milliGRAM(s) Oral at bedtime  budesonide 160 MICROgram(s)/formoterol 4.5 MICROgram(s) Inhaler 2 Puff(s) Inhalation two times a day  dabrafenib 150 milliGRAM(s) Oral every 12 hours  levothyroxine 200 MICROGram(s) Oral daily  magnesium oxide 400 milliGRAM(s) Oral three times a day with meals  metoprolol tartrate 12.5 milliGRAM(s) Oral two times a day  mirtazapine 15 milliGRAM(s) Oral at bedtime  montelukast 10 milliGRAM(s) Oral daily  pantoprazole    Tablet 40 milliGRAM(s) Oral before breakfast  predniSONE   Tablet 10 milliGRAM(s) Oral two times a day  senna 2 Tablet(s) Oral at bedtime  tamsulosin 0.4 milliGRAM(s) Oral at bedtime  trametinib 2 milliGRAM(s) Oral daily  trimethoprim  160 mG/sulfamethoxazole 800 mG 1 Tablet(s) Oral <User Schedule>    PRN MEDICATIONS  albuterol    90 MICROgram(s) HFA Inhaler 2 Puff(s) Inhalation every 6 hours PRN  ondansetron    Tablet 4 milliGRAM(s) Oral every 8 hours PRN  oxycodone    5 mG/acetaminophen 325 mG 2 Tablet(s) Oral every 4 hours PRN                                            ------------------------------------------------------------  VITAL SIGNS: Last 24 Hours  T(C): 36.9 (30 May 2023 05:03), Max: 37.8 (29 May 2023 21:40)  T(F): 98.5 (30 May 2023 05:03), Max: 100 (29 May 2023 21:40)  HR: 104 (30 May 2023 05:03) (103 - 110)  BP: 116/70 (30 May 2023 05:03) (92/61 - 125/78)  BP(mean): --  RR: 18 (30 May 2023 05:03) (18 - 18)  SpO2: 97% (30 May 2023 05:03) (95% - 97%)                                             --------------------------------------------------------------  LABS:                        7.4    11.06 )-----------( 235      ( 29 May 2023 07:00 )             22.8     05-29    135  |  100  |  14  ----------------------------<  110<H>  4.8   |  26  |  0.7    Ca    8.6      29 May 2023 07:00  Mg     1.7     05-29    TPro  5.0<L>  /  Alb  3.0<L>  /  TBili  0.5  /  DBili  x   /  AST  15  /  ALT  18  /  AlkPhos  176<H>  05-28                  CARDIAC MARKERS ( 28 May 2023 11:00 )  x     / x     / 249 U/L / x     / x                                                  -------------------------------------------------------------  RADIOLOGY:                                            --------------------------------------------------------------    PHYSICAL EXAM:                                             --------------------------------------------------------------                 JOAQUIN HERNANDEZ 63y Male  MRN#: [id number]     Hospital Day: 3d    CC:  Other injury of unspecified body region, initial encounter    HOSPITAL COURSE:   63M. PMH: thyroid cancer with mets (Follows at [facility]; on trametinib, dabrafenib), h/o of kyphoplasty 2/ bone mets, h/o recent RLE DVT on lovenox, HTN, COPD, Hypothyroidism   Presented 5/27 from home with inability to ambulate. Patient reports he started to experience intermittent pain in L thigh and went for CT and duplex today. When came back, started to experience unbearable pain in L high and observed some bruising limiting his mobility.    VS WNL  Labs notable for hgb 7.5 (11.6 in 04/2023), Na 131, K 5.4 hemolyzed (rpt WNL)  CTAP/CT thigh showed hematoma in L rectus femoris    Admitted to SDU for L thigh hematoma  Hgb downtrended to 6.5 - completed 1u pRBCs, now 7.4  Vascular on board, was considering IVC filter, but pt not a candidate since no DVT on LE duplex . Surgery following - no acute intervention.     SUBJECTIVE     Overnight events  None    Subjective complaints   Pt evaluated at bedside. No active complaints. Leg feels better today.                                             ----------------------------------------------------------  OBJECTIVE  PAST MEDICAL & SURGICAL HISTORY  Thyroid cancer    HTN (hypertension)    COPD (chronic obstructive pulmonary disease)    Borderline diabetes    H/O thyroidectomy                                              -----------------------------------------------------------  ALLERGIES:  Allergy Status Unknown                                            ------------------------------------------------------------    HOME MEDICATIONS  Home Medications:  Advair Diskus: 2 puff(s) inhaled 2 times a day (07 Feb 2020 07:22)  Bactrim  mg-160 mg oral tablet: 1 tab(s) orally Monday, Wednesday, and Friday (28 May 2023 02:01)  dabrafenib 75 mg oral capsule: 2 orally 2 times a day (20 Apr 2023 03:36)  esomeprazole 40 mg oral delayed release capsule: 1 tab(s) orally once a day (28 May 2023 01:59)  levothyroxine 200 mcg (0.2 mg) oral capsule: 1 cap(s) orally once a day (28 May 2023 02:02)  mirtazapine 15 mg oral tablet: 1 tab(s) orally once a day (at bedtime) (28 May 2023 01:59)  pravastatin 40 mg oral tablet: 1 tab(s) orally once a day (23 Nov 2019 18:22)  predniSONE 10 mg oral tablet: 1 orally 2 times a day (20 Apr 2023 03:35)  Singulair 10 mg oral tablet: 1 tab(s) orally once a day (23 Nov 2019 18:22)  Spiriva 18 mcg inhalation capsule: 1 cap(s) inhaled once a day (23 Nov 2019 18:22)  Toprol-XL 25 mg oral tablet, extended release: 1 tab(s) orally once a day (23 Nov 2019 18:22)  trametinib 2 mg oral tablet: 1 tab(s) orally once a day (28 May 2023 02:01)  Ventolin HFA 90 mcg/inh inhalation aerosol: 2 puff(s) inhaled 4 times a day (23 Nov 2019 18:22)                           MEDICATIONS:  STANDING MEDICATIONS  albuterol/ipratropium for Nebulization 3 milliLiter(s) Nebulizer every 6 hours  atorvastatin 10 milliGRAM(s) Oral at bedtime  budesonide 160 MICROgram(s)/formoterol 4.5 MICROgram(s) Inhaler 2 Puff(s) Inhalation two times a day  dabrafenib 150 milliGRAM(s) Oral every 12 hours  levothyroxine 200 MICROGram(s) Oral daily  magnesium oxide 400 milliGRAM(s) Oral three times a day with meals  metoprolol tartrate 12.5 milliGRAM(s) Oral two times a day  mirtazapine 15 milliGRAM(s) Oral at bedtime  montelukast 10 milliGRAM(s) Oral daily  pantoprazole    Tablet 40 milliGRAM(s) Oral before breakfast  predniSONE   Tablet 10 milliGRAM(s) Oral two times a day  senna 2 Tablet(s) Oral at bedtime  tamsulosin 0.4 milliGRAM(s) Oral at bedtime  trametinib 2 milliGRAM(s) Oral daily  trimethoprim  160 mG/sulfamethoxazole 800 mG 1 Tablet(s) Oral <User Schedule>    PRN MEDICATIONS  albuterol    90 MICROgram(s) HFA Inhaler 2 Puff(s) Inhalation every 6 hours PRN  ondansetron    Tablet 4 milliGRAM(s) Oral every 8 hours PRN  oxycodone    5 mG/acetaminophen 325 mG 2 Tablet(s) Oral every 4 hours PRN                                            ------------------------------------------------------------  VITAL SIGNS: Last 24 Hours  T(C): 36.9 (30 May 2023 05:03), Max: 37.8 (29 May 2023 21:40)  T(F): 98.5 (30 May 2023 05:03), Max: 100 (29 May 2023 21:40)  HR: 104 (30 May 2023 05:03) (103 - 110)  BP: 116/70 (30 May 2023 05:03) (92/61 - 125/78)  BP(mean): --  RR: 18 (30 May 2023 05:03) (18 - 18)  SpO2: 97% (30 May 2023 05:03) (95% - 97%)                                             --------------------------------------------------------------  LABS:                        7.4    11.06 )-----------( 235      ( 29 May 2023 07:00 )             22.8     05-29    135  |  100  |  14  ----------------------------<  110<H>  4.8   |  26  |  0.7    Ca    8.6      29 May 2023 07:00  Mg     1.7     05-29    TPro  5.0<L>  /  Alb  3.0<L>  /  TBili  0.5  /  DBili  x   /  AST  15  /  ALT  18  /  AlkPhos  176<H>  05-28                  CARDIAC MARKERS ( 28 May 2023 11:00 )  x     / x     / 249 U/L / x     / x                                                  -------------------------------------------------------------  RADIOLOGY:                                            --------------------------------------------------------------    PHYSICAL EXAM:  GEN: NAD  NEURO: Alert & Orientedx4,   HEENT: nontraumatic, normocephalic, neck supple  CARD: S1, S2 audible, no S3, regular rate and rhythm, no murmur  PULM: B/L breath sounds, no wheezing, crackles, or rales  ABD: Soft, nondistended, nontender  EXTR: tenderness to LLE, left thigh in ACE wrap                                           --------------------------------------------------------------

## 2023-05-30 NOTE — MEDICAL STUDENT PROGRESS NOTE(EDUCATION) - SUBJECTIVE AND OBJECTIVE BOX
HPI  Patient is a 63y old Male who presents with a chief complaint of inability to ambulate (30 May 2023 13:01)    Currently admitted to medicine with the primary diagnosis of Hematoma       Today is hospital day 3d.     INTERVAL HPI / OVERNIGHT EVENTS:  Patient was examined and seen at bedside. This morning he is resting comfortably in bed and reports no new issues or overnight events.     ROS: Otherwise unremarkable     PAST MEDICAL & SURGICAL HISTORY  Thyroid cancer    HTN (hypertension)    COPD (chronic obstructive pulmonary disease)    Borderline diabetes    H/O thyroidectomy      ALLERGIES  Allergy Status Unknown    MEDICATIONS  STANDING MEDICATIONS  albuterol/ipratropium for Nebulization 3 milliLiter(s) Nebulizer every 6 hours  atorvastatin 10 milliGRAM(s) Oral at bedtime  budesonide 160 MICROgram(s)/formoterol 4.5 MICROgram(s) Inhaler 2 Puff(s) Inhalation two times a day  dabrafenib 150 milliGRAM(s) Oral every 12 hours  enoxaparin Injectable 40 milliGRAM(s) SubCutaneous every 24 hours  levothyroxine 200 MICROGram(s) Oral daily  magnesium oxide 400 milliGRAM(s) Oral three times a day with meals  metoprolol tartrate 12.5 milliGRAM(s) Oral two times a day  mirtazapine 15 milliGRAM(s) Oral at bedtime  montelukast 10 milliGRAM(s) Oral daily  pantoprazole    Tablet 40 milliGRAM(s) Oral before breakfast  polyethylene glycol 3350 17 Gram(s) Oral daily  predniSONE   Tablet 10 milliGRAM(s) Oral two times a day  senna 2 Tablet(s) Oral at bedtime  tamsulosin 0.4 milliGRAM(s) Oral at bedtime  trametinib 2 milliGRAM(s) Oral daily  trimethoprim  160 mG/sulfamethoxazole 800 mG 1 Tablet(s) Oral <User Schedule>    PRN MEDICATIONS  albuterol    90 MICROgram(s) HFA Inhaler 2 Puff(s) Inhalation every 6 hours PRN  ondansetron    Tablet 4 milliGRAM(s) Oral every 8 hours PRN  oxycodone    5 mG/acetaminophen 325 mG 2 Tablet(s) Oral every 4 hours PRN    VITALS:  T(F): 99.2  HR: 98  BP: 106/68  RR: 18  SpO2: 97%    PHYSICAL EXAM  GEN: NAD, Resting comfortably in bed  PULM: Clear to auscultation bilaterally, No wheezes  CVS: Regular rate and rhythm, S1-S2, no murmurs  ABD: Soft, non-tender, non-distended, no guarding  EXT: No edema  NEURO: A&Ox3, no focal deficits    LABS                        7.1    9.19  )-----------( 235      ( 30 May 2023 07:00 )             22.9     05-30    140  |  100  |  18  ----------------------------<  129<H>  4.5   |  31  |  0.7    Ca    8.7      30 May 2023 07:00  Mg     2.0     05-30    TPro  5.3<L>  /  Alb  3.1<L>  /  TBili  0.3  /  DBili  x   /  AST  20  /  ALT  14  /  AlkPhos  179<H>  05-30                  RADIOLOGY    CT Angiograph A/P 5/27  IMPRESSION:    No CTA evidence of active extravasation within the abdomen or pelvis.    Partially visualized left thigh hematoma, better visualized on recent CT   lower extremity.    Multiple collections within the abdominal wall subcutaneous tissues which   may reflect blood products.    Physical Exam:  PE:  GEN-NAD, AAOx3, oob in chair  PULM- fair air entry, ctab  CVS- +s1/s2 irreg irreg  GI- soft NT ND +bs, + ecchymosis diffuse (appears from lovenox injections)- no rebound, no guarding  EXT- left thigh dressing in place

## 2023-05-30 NOTE — PROGRESS NOTE ADULT - ASSESSMENT
a/p:  64 yo M with hx of Thyroid cancer with mets, Recent RLE DVT ([place] [facility] [place] Kyphoplasty presents to ED for with a complaint of Left thigh pain x 1 week.     #Left thigh pain 2/2 left thigh hematoma-spontaneous (no h/o recent trauma)  #Acute blood loss anemia   - no signs of compartment syndrome   - CT shows Intramuscular hematoma within the rectus femoris measuring 14.9 x 5 x 1.9 cm. Layering hyperdense material within the hematoma likely reflecting acute blood products with Partially visualized intermediate density collections within the left lower abdominal wall, likely also reflecting blood products.  - s/p evaluated by surgery in ED --> no acute surgical intervention.  -vascular surgery following---not candidate for IVC filter or other repair at this time as duplex negative for dvt---continue to monitor closely   -repeat duplex neg for dvt (limited study)---will need repeat duplex in 2 weeks  -starting dvt ppx with lovenox (40 mg daily)   - monitor CBC--hb 7.1 today and getting 1 u prbc transtufion--f/ui post-transfusion cbc   - pain control and compression to left thigh    #Recent RLE DVT   - venous duplex LE negative     #Hyperkalemia, mild   - low k diet   - resolved     #COPD   - not in exacerbation.   - c/w inhalers     #Thyroid cancer with mets   - follows  at [facility] [place]   - patient is on chronic prednisone 10mg BID and Bactrim for ppx     #Hypothyroidism - on synthroid.     #HTN/DM2-- c/w home med     DVT/GI ppx  guarded prognisis    FULL CODE    anticipate likely dc home in next 48-72 hrs if hb remains stable after transfusion and toelrates dvt ppx with lovenox dailyy-      Dispo: Monitor hgb, PT eval  Plan of are d/w patient, daughter and wife .    #Progress Note Handoff  Pending (specify): PT evaluation (including needs to be able to stairs at home---f/u with PT with stairs)  Family discussion: d.w patient and daughter bedsdie   Disposition: SNF__x_ (Awaiting dc [email or web address]---f/u posttransfuusion therapy. a/p:  62 yo M with hx of Thyroid cancer with mets, Recent RLE DVT (on Lovenox started 3 weeks ago at [facility] [place] Kyphoplasty presents to ED for with a complaint of Left thigh pain x 1 week.     #Left thigh pain 2/2 left thigh hematoma-spontaneous (no h/o recent trauma)  #Acute blood loss anemia   - no signs of compartment syndrome   - CT shows Intramuscular hematoma within the rectus femoris measuring 14.9 x 5 x 1.9 cm. Layering hyperdense material within the hematoma likely reflecting acute blood products with Partially visualized intermediate density collections within the left lower abdominal wall, likely also reflecting blood products.  - s/p evaluated by surgery in ED --> no acute surgical intervention.  -vascular surgery following---not candidate for IVC filter or other repair at this time as duplex negative for dvt---continue to monitor closely   -repeat duplex neg for dvt (limited study)---will need repeat duplex in 2 weeks  -starting dvt ppx with lovenox (40 mg daily)   - monitor CBC--hb 7.1 today and getting 1 u prbc transtufion--f/ui post-transfusion cbc   - pain control and compression to left thigh    #Recent RLE DVT   - venous duplex LE negative     #Hyperkalemia, mild   - low k diet   - resolved     #COPD   - not in exacerbation.   - c/w inhalers     #Thyroid cancer with mets   - follows  at [facility] [place]   - patient is on chronic prednisone 10mg BID and Bactrim for ppx     #Hypothyroidism - on synthroid.     #HTN/DM2-- c/w home med     DVT/GI ppx  guarded prognisis    FULL CODE    anticipate likely dc home in next 48-72 hrs if hb remains stable after transfusion and toelrates dvt ppx with lovenox dailyy-      Dispo: Monitor hgb, PT eval  Plan of are d/w patient, daughter and wife .    #Progress Note Handoff  Pending (specify): PT evaluation (including needs to be able to stairs at home---f/u with PT with stairs)  Family discussion: d.w patient and daughter bedsdie   Disposition: SNF__x_ (Awaiting dc back to [facility])---f/u posttransfuusion therapy  '    Total time spent to complete patient's bedside assessment, review medical chart, discuss medical plan of care with covering medical team was more than 35 minutes  with >50% of time spendt face to face with patient, discussion with patient/family and/or coordination of care

## 2023-05-30 NOTE — PROGRESS NOTE ADULT - SUBJECTIVE AND OBJECTIVE BOX
Patient is a 63y old  Male who presents with a chief complaint of inability to ambulate (30 May 2023 06:09)    HPI:  63y Male with PMH of thyroid cancer with mets, h/o of kyphoplasty, h/o recent RLE DVT on lovenox presents from home with inability to ambulate. Patient reports he started to experience intermittent pain in L thigh and went for CT and duplex today. When came back, started to experience unbearable pain in L high and observed some bruising limiting his mobility. Therefore presents to the ED  Denies any fever, chills, headache, neurological deficits, nausea, vomiting, chest pain, palpitations, shortness of breathe, cough, abdominal pain, hematochezia, melena, hematemesis, diarrhea or constipation   (27 May 2023 22:46)    PAST MEDICAL & SURGICAL HISTORY:  Thyroid cancer  HTN (hypertension)  COPD (chronic obstructive pulmonary disease)  Borderline diabetes  H/O thyroidectomy    patient seen and examined independently on morning rounds for the first time today, chart reviewed and discussed with the medicine resident and on interdisciplinary rounds.      hospital day #3    no overnight events--d/w patient and daughter bedside- awaiting PT to evaluate (he has multiple stairs at home)    Vital Signs Last 24 Hrs  T(C): 36.9 (30 May 2023 05:03), Max: 37.8 (29 May 2023 21:40)  T(F): 98.5 (30 May 2023 05:03), Max: 100 (29 May 2023 21:40)  HR: 104 (30 May 2023 05:03) (103 - 110)  BP: 116/70 (30 May 2023 05:03) (92/61 - 125/78)  BP(mean): --  RR: 18 (30 May 2023 05:03) (18 - 18)  SpO2: 97% (30 May 2023 05:03) (95% - 97%)    PE:  GEN-NAD, AAOx3, oob in chair  PULM- fair air entry, ctab  CVS- +s1/s2 irreg irreg  GI- soft NT ND +bs, + ecchymosis diffuse (appears from lovenox injections)- no rebound, no guarding  EXT- left thigh dressing in place                          7.1    9.19  )-----------( 235      ( 30 May 2023 07:00 )             22.9     05-30    140  |  100  |  18  ----------------------------<  129<H>  4.5   |  31  |  0.7    Ca    8.7      30 May 2023 07:00  Mg     2.0     05-30    TPro  5.3<L>  /  Alb  3.1<L>  /  TBili  0.3  /  DBili  x   /  AST  20  /  ALT  14  /  AlkPhos  179<H>  05-30              MEDICATIONS  (STANDING):  albuterol/ipratropium for Nebulization 3 milliLiter(s) Nebulizer every 6 hours  atorvastatin 10 milliGRAM(s) Oral at bedtime  budesonide 160 MICROgram(s)/formoterol 4.5 MICROgram(s) Inhaler 2 Puff(s) Inhalation two times a day  dabrafenib 150 milliGRAM(s) Oral every 12 hours  enoxaparin Injectable 40 milliGRAM(s) SubCutaneous every 24 hours  levothyroxine 200 MICROGram(s) Oral daily  magnesium oxide 400 milliGRAM(s) Oral three times a day with meals  metoprolol tartrate 12.5 milliGRAM(s) Oral two times a day  mirtazapine 15 milliGRAM(s) Oral at bedtime  montelukast 10 milliGRAM(s) Oral daily  pantoprazole    Tablet 40 milliGRAM(s) Oral before breakfast  polyethylene glycol 3350 17 Gram(s) Oral daily  predniSONE   Tablet 10 milliGRAM(s) Oral two times a day  senna 2 Tablet(s) Oral at bedtime  tamsulosin 0.4 milliGRAM(s) Oral at bedtime  trametinib 2 milliGRAM(s) Oral daily  trimethoprim  160 mG/sulfamethoxazole 800 mG 1 Tablet(s) Oral <User Schedule>

## 2023-05-30 NOTE — PROGRESS NOTE ADULT - ASSESSMENT
63y Male with PMH of thyroid cancer with mets, paralyzed phernic nerve and diaphragm, COPD on , Hypertension, Diabetes, h/o of kyphoplast, h/o recent RLE DVT on lovenox presents from home with inability to ambulate    #L rectus femoris hematoma  #Acute Anemia  #Recent DVT (~3wks prior to admission)  - Hgb downtrended to 6.5 (5/28) - completed 1u pRBCs, now 7.4  - CT LLE (5/27): Intramuscular hematoma within the rectus femoris measuring 14.9 x 5 x 1.9cm  - Duplex BLLE Venous (5/27): (-) for DVT  *****  - surgery recs no surgical intervention  - Vacular Sx on board - not a candidate since no DVT on LE duplex   - Active T + S  - Holding home AC  - PT/OT    #Thyroid CA with mets  *[facility]/w [facility]  - lytic lesions in b/l kidneys on CTAP  - C/w home non-formulary trametinib, dabrafenib  - C/w home Prednisone 10mg BID, Bactrim ppx   - C/w percocet PRN (allergic to dilaudid and morphine. Causes decreased respiratory drive)    #Paralyzed phrenic nerve  #COPD  - c/w albuteol PRN, symbicort, duonebs  - not in exacerbation    #HTN - c/w metoprolol xl  #HLD - start lipitor  #Hypothyroidism - on synthroid.     #Misc  DVT Prophylaxis: None  Diet: DASH  GI Prophylaxis: PPI  Activity: IAT  Code Status: Full  Disposition: Acute   63y Male with PMH of thyroid cancer with mets, paralyzed phernic nerve and diaphragm, COPD on , Hypertension, Diabetes, h/o of kyphoplast, h/o recent RLE DVT on lovenox presents from home with inability to ambulate    #L rectus femoris hematoma  #Acute Anemia  #Recent DVT (~3-4wks prior to admission)  - Hgb downtrended to 6.5 (5/28) - completed 1u pRBCs, now 7.4  - CT LLE (5/27): Intramuscular hematoma within the rectus femoris measuring 14.9 x 5 x 1.9cm  - Duplex BLLE Venous (5/27): (-) for DVT  *****  - surgery recs no surgical intervention  - Vacular Sx on board - not a candidate since no DVT on LE duplex; Patient may remain off of anticoagulation, recommend DVT ppx while inpatient  - PT/OT  - Active T + S  - Holding home AC  - START Lovenox 40  - Give 1u pRBCs   *****   - Repeat venous duplex in 2 weeks with outpatient follow up with vascular surgery ([phone], Dr. Poon)    #Thyroid CA with mets  *[facility]  - lytic lesions in b/l kidneys on CTAP  - C/w home non-formulary trametinib, dabrafenib  - C/w home Prednisone 10mg BID, Bactrim ppx   - C/w percocet PRN (allergic to dilaudid and morphine. Causes decreased respiratory drive)    #Paralyzed phrenic nerve  #COPD  - c/w albuteol PRN, symbicort, duonebs  - not in exacerbation    #HTN - c/w metoprolol xl  #HLD - start lipitor  #Hypothyroidism - on synthroid.     #Misc  - DVT Prophylaxis: Lovenox  - Diet: DASH  - GI Prophylaxis: PPI

## 2023-05-30 NOTE — PROGRESS NOTE ADULT - ASSESSMENT
63yM w/ PMHx of thyroid cancer with mets, h/o kyphoplasty, recent DVT in RLE on lovenox presents to the ED for atraumatic left thigh pain that began a few days ago. Physical exam findings, imaging, and labs as documented above.     PLAN:  #Left Thigh Hematoma   - No acute surgical intervention required at this time   - Monitor vitals   - Serial CBC Q6H , monitor Hgb   - Recommend compression to left thigh   - Pain control   - Per vascular, DVT ppx, (full AC not indicated)   - PT/Rehab    [facility]  SPECTRA [id number] 63yM w/ PMHx of thyroid cancer with mets, h/o kyphoplasty, recent DVT in RLE on lovenox presents to the ED for atraumatic left thigh pain that began a few days ago. Physical exam findings, imaging, and labs as documented above.     PLAN:  #Left Thigh Hematoma   - No acute surgical intervention required at this time   - Monitor vitals   - Serial CBC Q6H , monitor Hgb   - Recommend compression to left thigh   - Pain control   - Per vascular, DVT ppx, (full AC not indicated)   - PT/Rehab  - Recall surgery PRN    [facility]  SPECTRA [id number]

## 2023-05-30 NOTE — MEDICAL STUDENT PROGRESS NOTE(EDUCATION) - NS MD HP STUD ASPLAN PLAN FT
#Left thigh pain 2/2 left thigh hematoma-spontaneous (no h/o recent trauma)  #Acute blood loss anemia   - no signs of compartment syndrome   - CT shows Intramuscular hematoma within the rectus femoris measuring 14.9 x 5 x 1.9 cm. Layering hyperdense material within the hematoma likely reflecting acute blood products with Partially visualized intermediate density collections within the left lower abdominal wall, likely also reflecting blood products.  - s/p evaluated by surgery in ED --> no acute surgical intervention.  -vascular surgery following---not candidate for IVC filter or other repair at this time as duplex negative for dvt---continue to monitor closely   -repeat duplex neg for dvt (limited study)---will need repeat duplex in 2 weeks  -starting dvt ppx with lovenox (40 mg daily)   - monitor CBC--hb 7.1 today and getting 1 u prbc transtufion--f/ui post-transfusion cbc   - pain control and compression to left thigh    #Recent RLE DVT   - venous duplex LE negative     #Hyperkalemia, mild   - low k diet   - resolved     #COPD   - not in exacerbation.   - c/w inhalers     #Thyroid cancer with mets   - follows  at [facility]   - patient is on chronic prednisone 10mg BID and Bactrim for ppx     #Hypothyroidism - on synthroid.     #HTN/DM2-- c/w home med

## 2023-05-31 ENCOUNTER — TRANSCRIPTION ENCOUNTER (OUTPATIENT)
Age: 63
End: 2023-05-31

## 2023-05-31 LAB
ALBUMIN SERPL ELPH-MCNC: 2.9 G/DL — LOW (ref 3.5–5.2)
ALP SERPL-CCNC: 186 U/L — HIGH (ref 30–115)
ALT FLD-CCNC: 13 U/L — SIGNIFICANT CHANGE UP (ref 0–41)
ANION GAP SERPL CALC-SCNC: 9 MMOL/L — SIGNIFICANT CHANGE UP (ref 7–14)
AST SERPL-CCNC: 15 U/L — SIGNIFICANT CHANGE UP (ref 0–41)
BASOPHILS # BLD AUTO: 0.03 K/UL — SIGNIFICANT CHANGE UP (ref 0–0.2)
BASOPHILS NFR BLD AUTO: 0.3 % — SIGNIFICANT CHANGE UP (ref 0–1)
BILIRUB SERPL-MCNC: 0.6 MG/DL — SIGNIFICANT CHANGE UP (ref 0.2–1.2)
BUN SERPL-MCNC: 16 MG/DL — SIGNIFICANT CHANGE UP (ref 10–20)
CALCIUM SERPL-MCNC: 8.5 MG/DL — SIGNIFICANT CHANGE UP (ref 8.4–10.5)
CHLORIDE SERPL-SCNC: 100 MMOL/L — SIGNIFICANT CHANGE UP (ref 98–110)
CO2 SERPL-SCNC: 28 MMOL/L — SIGNIFICANT CHANGE UP (ref 17–32)
CREAT SERPL-MCNC: 0.7 MG/DL — SIGNIFICANT CHANGE UP (ref 0.7–1.5)
EGFR: 104 ML/MIN/1.73M2 — SIGNIFICANT CHANGE UP
EOSINOPHIL # BLD AUTO: 0.18 K/UL — SIGNIFICANT CHANGE UP (ref 0–0.7)
EOSINOPHIL NFR BLD AUTO: 1.8 % — SIGNIFICANT CHANGE UP (ref 0–8)
GLUCOSE SERPL-MCNC: 110 MG/DL — HIGH (ref 70–99)
HCT VFR BLD CALC: 24.5 % — LOW (ref 42–52)
HGB BLD-MCNC: 7.8 G/DL — LOW (ref 14–18)
IMM GRANULOCYTES NFR BLD AUTO: 0.9 % — HIGH (ref 0.1–0.3)
LYMPHOCYTES # BLD AUTO: 1.19 K/UL — LOW (ref 1.2–3.4)
LYMPHOCYTES # BLD AUTO: 12 % — LOW (ref 20.5–51.1)
MAGNESIUM SERPL-MCNC: 2 MG/DL — SIGNIFICANT CHANGE UP (ref 1.8–2.4)
MCHC RBC-ENTMCNC: 27.6 PG — SIGNIFICANT CHANGE UP (ref 27–31)
MCHC RBC-ENTMCNC: 31.8 G/DL — LOW (ref 32–37)
MCV RBC AUTO: 86.6 FL — SIGNIFICANT CHANGE UP (ref 80–94)
MONOCYTES # BLD AUTO: 1.12 K/UL — HIGH (ref 0.1–0.6)
MONOCYTES NFR BLD AUTO: 11.3 % — HIGH (ref 1.7–9.3)
NEUTROPHILS # BLD AUTO: 7.31 K/UL — HIGH (ref 1.4–6.5)
NEUTROPHILS NFR BLD AUTO: 73.7 % — SIGNIFICANT CHANGE UP (ref 42.2–75.2)
NRBC # BLD: 0 /100 WBCS — SIGNIFICANT CHANGE UP (ref 0–0)
PLATELET # BLD AUTO: 240 K/UL — SIGNIFICANT CHANGE UP (ref 130–400)
PMV BLD: 9.9 FL — SIGNIFICANT CHANGE UP (ref 7.4–10.4)
POTASSIUM SERPL-MCNC: 5 MMOL/L — SIGNIFICANT CHANGE UP (ref 3.5–5)
POTASSIUM SERPL-SCNC: 5 MMOL/L — SIGNIFICANT CHANGE UP (ref 3.5–5)
PROT SERPL-MCNC: 5.1 G/DL — LOW (ref 6–8)
RBC # BLD: 2.83 M/UL — LOW (ref 4.7–6.1)
RBC # FLD: 18.9 % — HIGH (ref 11.5–14.5)
SODIUM SERPL-SCNC: 137 MMOL/L — SIGNIFICANT CHANGE UP (ref 135–146)
WBC # BLD: 9.92 K/UL — SIGNIFICANT CHANGE UP (ref 4.8–10.8)
WBC # FLD AUTO: 9.92 K/UL — SIGNIFICANT CHANGE UP (ref 4.8–10.8)

## 2023-05-31 PROCEDURE — 99232 SBSQ HOSP IP/OBS MODERATE 35: CPT

## 2023-05-31 RX ORDER — RIVAROXABAN 15 MG-20MG
1 KIT ORAL
Qty: 1 | Refills: 0
Start: 2023-05-31 | End: 2023-06-29

## 2023-05-31 RX ADMIN — Medication 12.5 MILLIGRAM(S): at 17:25

## 2023-05-31 RX ADMIN — MAGNESIUM OXIDE 400 MG ORAL TABLET 400 MILLIGRAM(S): 241.3 TABLET ORAL at 09:01

## 2023-05-31 RX ADMIN — PANTOPRAZOLE SODIUM 40 MILLIGRAM(S): 20 TABLET, DELAYED RELEASE ORAL at 05:30

## 2023-05-31 RX ADMIN — TAMSULOSIN HYDROCHLORIDE 0.4 MILLIGRAM(S): 0.4 CAPSULE ORAL at 21:22

## 2023-05-31 RX ADMIN — DABRAFENIB 150 MILLIGRAM(S): 75 CAPSULE ORAL at 05:29

## 2023-05-31 RX ADMIN — SENNA PLUS 2 TABLET(S): 8.6 TABLET ORAL at 21:21

## 2023-05-31 RX ADMIN — Medication 200 MICROGRAM(S): at 05:29

## 2023-05-31 RX ADMIN — Medication 10 MILLIGRAM(S): at 17:26

## 2023-05-31 RX ADMIN — MONTELUKAST 10 MILLIGRAM(S): 4 TABLET, CHEWABLE ORAL at 11:26

## 2023-05-31 RX ADMIN — Medication 3 MILLILITER(S): at 14:52

## 2023-05-31 RX ADMIN — Medication 3 MILLILITER(S): at 09:57

## 2023-05-31 RX ADMIN — Medication 10 MILLIGRAM(S): at 05:29

## 2023-05-31 RX ADMIN — ATORVASTATIN CALCIUM 10 MILLIGRAM(S): 80 TABLET, FILM COATED ORAL at 21:22

## 2023-05-31 RX ADMIN — Medication 1 TABLET(S): at 11:26

## 2023-05-31 RX ADMIN — MIRTAZAPINE 15 MILLIGRAM(S): 45 TABLET, ORALLY DISINTEGRATING ORAL at 21:22

## 2023-05-31 RX ADMIN — MAGNESIUM OXIDE 400 MG ORAL TABLET 400 MILLIGRAM(S): 241.3 TABLET ORAL at 11:39

## 2023-05-31 RX ADMIN — ENOXAPARIN SODIUM 40 MILLIGRAM(S): 100 INJECTION SUBCUTANEOUS at 17:27

## 2023-05-31 RX ADMIN — DABRAFENIB 150 MILLIGRAM(S): 75 CAPSULE ORAL at 17:25

## 2023-05-31 RX ADMIN — Medication 12.5 MILLIGRAM(S): at 05:29

## 2023-05-31 RX ADMIN — Medication 3 MILLILITER(S): at 20:43

## 2023-05-31 RX ADMIN — POLYETHYLENE GLYCOL 3350 17 GRAM(S): 17 POWDER, FOR SOLUTION ORAL at 11:27

## 2023-05-31 RX ADMIN — MAGNESIUM OXIDE 400 MG ORAL TABLET 400 MILLIGRAM(S): 241.3 TABLET ORAL at 17:27

## 2023-05-31 NOTE — PROGRESS NOTE ADULT - ASSESSMENT
63y Male with PMH of thyroid cancer with mets, paralyzed phernic nerve and diaphragm, COPD on , Hypertension, Diabetes, h/o of kyphoplast, h/o recent RLE DVT on lovenox presents from home with inability to ambulate    #L rectus femoris hematoma  #Acute Anemia  #Recent DVT (~3-4wks prior to admission)  - Hgb downtrended to 6.5 (5/28) - completed 1u pRBCs, now 7.4, given 2nd 1u pRBCs (5/30) for hgb 7.1 -> now 8.1  - CT LLE (5/27): Intramuscular hematoma within the rectus femoris measuring 14.9 x 5 x 1.9cm  - Duplex BLLE Venous (5/27): (-) for DVT  *****  - surgery recs no surgical intervention, compression to thigh  - Vacular Sx on board - not a candidate since no DVT on LE duplex; Patient may remain off of anticoagulation, recommend DVT ppx while inpatient  - PT/OT  - Active T + S  - Holding home AC  - C/w Lovenox 40  *****   - Repeat venous duplex in 2 weeks with outpatient follow up with vascular surgery ([phone], Dr. Poon)    #Thyroid CA with mets  *[facility]  - lytic lesions in b/l kidneys on CTAP  - C/w home non-formulary trametinib, dabrafenib (pt's own supply of trametinib?)  - C/w home Prednisone 10mg BID, Bactrim ppx   - C/w percocet PRN (allergic to dilaudid and morphine. Causes decreased respiratory drive)    #Paralyzed phrenic nerve  #COPD  - c/w albuteol PRN, symbicort, duonebs  - not in exacerbation    #HTN - c/w metoprolol xl  #HLD - start lipitor  #Hypothyroidism - on synthroid.     #Misc  - DVT Prophylaxis: Lovenox  - Diet: DASH  - GI Prophylaxis: PPI   63y Male with PMH of thyroid cancer with mets, paralyzed phernic nerve and diaphragm, COPD on , Hypertension, Diabetes, h/o of kyphoplast, h/o recent RLE DVT on lovenox presents from home with inability to ambulate    #L rectus femoris hematoma  #Acute Anemia  #Recent DVT (~3-4wks prior to admission)  - Hgb downtrended to 6.5 (5/28) - completed 1u pRBCs, now 7.4, given 2nd 1u pRBCs (5/30) for hgb 7.1 -> now 8.1  - CT LLE (5/27): Intramuscular hematoma within the rectus femoris measuring 14.9 x 5 x 1.9cm  - Duplex BLLE Venous (5/27): (-) for DVT  *****  - surgery recs no surgical intervention, compression to thigh  - Vacular Sx on board - not a candidate since no DVT on LE duplex; Patient may remain off of anticoagulation, recommend DVT ppx while inpatient  - PT/OT  - Active T + S  - Holding home AC  - C/w Lovenox 40  *****   - Repeat venous duplex in 2 weeks with outpatient follow up with vascular surgery ([phone], Dr. Poon)    #Thyroid CA with mets  *[facility]  - lytic lesions in b/l kidneys on CTAP  - C/w home non-formulary trametinib, dabrafenib (pt has his own supply, is taking)  - C/w home Prednisone 10mg BID, Bactrim ppx   - C/w percocet PRN (allergic to dilaudid and morphine. Causes decreased respiratory drive)    #Paralyzed phrenic nerve  #COPD  - c/w albuterol PRN, Symbicort, Duoneb  - not in exacerbation    #HTN - c/w metoprolol xl  #HLD - start lipitor  #Hypothyroidism - on synthroid.     #Misc  - DVT Prophylaxis: Lovenox  - Diet: DASH  - GI Prophylaxis: PPI   63y Male with PMH of thyroid cancer with mets, paralyzed phernic nerve and diaphragm, COPD on , Hypertension, Diabetes, h/o of kyphoplast, h/o recent RLE DVT on lovenox presents from home with inability to ambulate    #L rectus femoris hematoma  #Acute Anemia  #Recent DVT (~3-4wks prior to admission)  - Hgb downtrended to 6.5 (5/28) - completed 1u pRBCs, now 7.4, given 2nd 1u pRBCs (5/30) for hgb 7.1 -> now 8.1  - CT LLE (5/27): Intramuscular hematoma within the rectus femoris measuring 14.9 x 5 x 1.9cm  - Duplex BLLE Venous (5/27): (-) for DVT  *****  - surgery recs no surgical intervention, compression to thigh  - Vacular Sx on board - not a candidate since no DVT on LE duplex; Patient may remain off of anticoagulation, recommend DVT ppx while inpatient; : Would recommend resuming AC when the Hg and hematoma is stable  - PT/OT  - Active T + S  - Holding home AC  - C/w Lovenox 40  *****   - Repeat venous duplex in 2 weeks with outpatient follow up with vascular surgery ([phone], Dr. Poon)    #Thyroid CA with mets  *F/w [facility]  - lytic lesions in b/l kidneys on CTAP  - C/w home non-formulary trametinib, dabrafenib (pt has his own supply, is taking)  - C/w home Prednisone 10mg BID, Bactrim ppx   - C/w percocet PRN (allergic to dilaudid and morphine. Causes decreased respiratory drive)    #Paralyzed phrenic nerve  #COPD  - c/w albuterol PRN, Symbicort, Duoneb  - not in exacerbation    #HTN - c/w metoprolol xl  #HLD - start lipitor  #Hypothyroidism - on synthroid.     #Misc  - DVT Prophylaxis: Lovenox  - Diet: DASH  - GI Prophylaxis: PPI

## 2023-05-31 NOTE — PROGRESS NOTE ADULT - SUBJECTIVE AND OBJECTIVE BOX
GENERAL SURGERY PROGRESS NOTE    Patient: JOAQUIN HERNANDEZ , 63y ([birth year])Male   MRN: [id number]  Location: [address]  Visit: 05-27-23 Inpatient  Date: 05-31-23 @ 06:40    Admission: Other injury of unspecified body region, initial encounter    24 Hour Events:  S/p 1u pRBC. Hgb 7.1 > 8.1.  Ambulating, hematoma site stable    Vitals:  T(F): 98.2 (05-31-23 @ 04:40), Max: 99.5 (05-30-23 @ 18:59)  HR: 91 (05-31-23 @ 04:40)  BP: 116/76 (05-31-23 @ 04:40)  RR: 18 (05-31-23 @ 04:40)  SpO2: 96% (05-30-23 @ 19:37)    Diet, DASH/TLC:   Sodium & Cholesterol Restricted    Fluids:   I & O's:    PHYSICAL EXAM:  General: NAD  Cardiac: RRR  Respiratory: unlabored breathing at rest  Abdomen: Soft, non-distended, non-tender, no rebound, no guarding.  Musculoskeletal: FROM in b/l UE and LE  Neuro: Sensation grossly intact and equal throughout, no focal deficits  Skin: Warm/dry, normal color, no jaundice  Incision/wound: Right upper medial thigh bruising. Stable hematoma    MEDICATIONS  (STANDING):  albuterol/ipratropium for Nebulization 3 milliLiter(s) Nebulizer every 6 hours  atorvastatin 10 milliGRAM(s) Oral at bedtime  budesonide 160 MICROgram(s)/formoterol 4.5 MICROgram(s) Inhaler 2 Puff(s) Inhalation two times a day  dabrafenib 150 milliGRAM(s) Oral every 12 hours  enoxaparin Injectable 40 milliGRAM(s) SubCutaneous every 24 hours  levothyroxine 200 MICROGram(s) Oral daily  magnesium oxide 400 milliGRAM(s) Oral three times a day with meals  metoprolol tartrate 12.5 milliGRAM(s) Oral two times a day  mirtazapine 15 milliGRAM(s) Oral at bedtime  montelukast 10 milliGRAM(s) Oral daily  pantoprazole    Tablet 40 milliGRAM(s) Oral before breakfast  polyethylene glycol 3350 17 Gram(s) Oral daily  predniSONE   Tablet 10 milliGRAM(s) Oral two times a day  senna 2 Tablet(s) Oral at bedtime  tamsulosin 0.4 milliGRAM(s) Oral at bedtime  trametinib 2 milliGRAM(s) Oral daily  trimethoprim  160 mG/sulfamethoxazole 800 mG 1 Tablet(s) Oral <User Schedule>    MEDICATIONS  (PRN):  albuterol    90 MICROgram(s) HFA Inhaler 2 Puff(s) Inhalation every 6 hours PRN Shortness of Breath and/or Wheezing  ondansetron    Tablet 4 milliGRAM(s) Oral every 8 hours PRN Nausea and/or Vomiting  oxycodone    5 mG/acetaminophen 325 mG 2 Tablet(s) Oral every 4 hours PRN Moderate Pain (4 - 6)    DVT PROPHYLAXIS: enoxaparin Injectable 40 milliGRAM(s) SubCutaneous every 24 hours    GI PROPHYLAXIS: pantoprazole    Tablet 40 milliGRAM(s) Oral before breakfast    ANTICOAGULATION:   ANTIBIOTICS:  trimethoprim  160 mG/sulfamethoxazole 800 mG 1 Tablet(s)    LAB/STUDIES:  Labs:  CAPILLARY BLOOD GLUCOSE                        8.1    10.35 )-----------( 247      ( 30 May 2023 17:42 )             25.6       Auto Neutrophil %: 66.4 % (05-30-23 @ 17:42)  Auto Immature Granulocyte %: 1.3 % (05-30-23 @ 17:42)  Auto Neutrophil %: 73.6 % (05-30-23 @ 07:00)  Auto Immature Granulocyte %: 1.0 % (05-30-23 @ 07:00)    05-30    140  |  100  |  18  ----------------------------<  129<H>  4.5   |  31  |  0.7      Calcium, Total Serum: 8.7 mg/dL (05-30-23 @ 07:00)      LFTs:             5.3  | 0.3  | 20       ------------------[179     ( 30 May 2023 07:00 )  3.1  | x    | 14          Lipase:x      Amylase:x         Lactate, Blood: 1.3 mmol/L (05-28-23 @ 11:00)    Coags:

## 2023-05-31 NOTE — PROGRESS NOTE ADULT - ASSESSMENT
a/p:  62 yo M with hx of Thyroid cancer with mets, Recent RLE DVT (on Lovenox started 3 weeks ago at [facility] [place]), Kyphoplasty presents to ED for with a complaint of Left thigh pain x 1 week.     #Left thigh pain 2/2 left thigh hematoma-spontaneous (no h/o recent trauma)  #Acute blood loss anemia   - no signs of compartment syndrome   - CT shows Intramuscular hematoma within the rectus femoris measuring 14.9 x 5 x 1.9 cm. Layering hyperdense material within the hematoma likely reflecting acute blood products with Partially visualized intermediate density collections within the left lower abdominal wall, likely also reflecting blood products.  - s/p evaluation by surgery in ED --> no acute surgical intervention.  -vascular surgery following---not candidate for IVC filter or other repair at this time as duplex negative for dvt---continue to monitor closely   -repeat duplex neg for dvt (limited study)---will need repeat duplex in 2 weeks  -starting dvt ppx with lovenox (40 mg daily) while inpatient  -s/p 1u prbc transfusion yesterday  - monitor CBC--hb 7.8 today  - pain control and compression to left thigh    #Recent RLE DVT   - venous duplex LE negative   -continue lovenox 40 mg once daily for dvt ppx with repeat LE duplex in 2 weeks as outpatient    #Hyperkalemia, mild   - low k diet   - resolved     #COPD   - not in exacerbation.   - c/w inhalers     #Thyroid cancer with mets   - follows  at [facility] [place]   - patient is on chronic prednisone 10mg BID and Bactrim for ppx     #Hypothyroidism - on synthroid.     #HTN/DM2-- c/w home med     DVT/GI ppx  guarded prognisis    FULL CODE    anticipate likely dc home in am  if hb remains stable     Dispo: Monitor hgb, PT eval  Plan of are d/w patient and wife    #Progress Note Handoff  Pending (specify): f/u with PT-  Family discussion: d.w patient and wife bedside   Disposition: plan for dc home likely in am'    Total time spent to complete patient's bedside assessment, review medical chart, discuss medical plan of care with covering medical team was more than 35 minutes  with >50% of time spendt face to face with patient, discussion with patient/family and/or coordination of care a/p:  64 yo M with hx of Thyroid cancer with mets, Recent RLE DVT (on Lovenox started 3 weeks ago at [facility] [place]), Kyphoplasty presents to ED for with a complaint of Left thigh pain x 1 week.     #Left thigh pain 2/2 left thigh hematoma-spontaneous (no h/o recent trauma)  #Acute blood loss anemia   - no signs of compartment syndrome   - CT shows Intramuscular hematoma within the rectus femoris measuring 14.9 x 5 x 1.9 cm. Layering hyperdense material within the hematoma likely reflecting acute blood products with Partially visualized intermediate density collections within the left lower abdominal wall, likely also reflecting blood products.  - s/p evaluation by surgery in ED --> no acute surgical intervention.  -vascular surgery following---not candidate for IVC filter or other repair at this time as duplex negative for dvt---continue to monitor closely   -duplex neg for dvt (limited study)---will need repeat duplex in 2 weeks  -starting dvt ppx with lovenox (40 mg daily) while inpatient  -s/p 1u prbc transfusion yesterday  - monitor CBC--hb 7.8 today  - pain control and compression to left thigh    #Recent RLE DVT   - venous duplex LE negative   -continue lovenox 40 mg once daily for dvt ppx while inpatient  -duplex negative-- no need to continue AC but patient needs to repeat LE duplex in 2 weeks after dc     #Hyperkalemia, mild   - low k diet   - resolved     #COPD   - not in exacerbation.   - c/w inhalers     #Thyroid cancer with mets   - follows  at [facility] [place]   - patient is on chronic prednisone 10mg BID and Bactrim for ppx     #Hypothyroidism - on synthroid.     #HTN/DM2-- c/w home med     DVT/GI ppx  guarded prognisis    FULL CODE    anticipate likely dc home in am if hb remains stable and continues working with PT including doing stairs    Dispo: Monitor hgb, PT eval  Plan d/w patient and wife bedside  Disposition: anticipate dc home in am'    Total time spent to complete patient's bedside assessment, review medical chart, discuss medical plan of care with covering medical team was more than 35 minutes  with >50% of time spendt face to face with patient, discussion with patient/family and/or coordination of care

## 2023-05-31 NOTE — PROGRESS NOTE ADULT - SUBJECTIVE AND OBJECTIVE BOX
Patient is a 63y old  Male who presents with a chief complaint of inability to ambulate (30 May 2023 06:09)    HPI:  63y Male with PMH of thyroid cancer with mets, h/o of kyphoplasty, h/o recent RLE DVT on lovenox presents from home with inability to ambulate. Patient reports he started to experience intermittent pain in L thigh and went for CT and duplex today. When came back, started to experience unbearable pain in L high and observed some bruising limiting his mobility. Therefore presents to the ED  Denies any fever, chills, headache, neurological deficits, nausea, vomiting, chest pain, palpitations, shortness of breathe, cough, abdominal pain, hematochezia, melena, hematemesis, diarrhea or constipation   (27 May 2023 22:46)    PAST MEDICAL & SURGICAL HISTORY:  Thyroid cancer  HTN (hypertension)  COPD (chronic obstructive pulmonary disease)  Borderline diabetes  H/O thyroidectomy    patient seen and examined independently on morning rounds, chart reviewed and discussed with the medicine resident and on interdisciplinary rounds.      hospital day #4    no overnight events--d/w patient and wife bedside-ambulated with PT including doing few stairs-     PE:  GEN-NAD, AAOx3, oob in chair  PULM- fair air entry, ctab  CVS- +s1/s2 irreg irreg  GI- soft NT ND +bs, + ecchymosis diffuse (appears from lovenox injections)- no rebound, no guarding  EXT- left thigh medial ttp and thigh fullness                  Labs:                        7.8    9.92  )-----------( 240      ( 31 May 2023 07:31 )             24.5     CBC Full  -  ( 31 May 2023 07:31 )  WBC Count : 9.92 K/uL  RBC Count : 2.83 M/uL  Hemoglobin : 7.8 g/dL  Hematocrit : 24.5 %  Platelet Count - Automated : 240 K/uL  Mean Cell Volume : 86.6 fL  Mean Cell Hemoglobin : 27.6 pg  Mean Cell Hemoglobin Concentration : 31.8 g/dL  Auto Neutrophil # : 7.31 K/uL  Auto Lymphocyte # : 1.19 K/uL  Auto Monocyte # : 1.12 K/uL  Auto Eosinophil # : 0.18 K/uL  Auto Basophil # : 0.03 K/uL  Auto Neutrophil % : 73.7 %  Auto Lymphocyte % : 12.0 %  Auto Monocyte % : 11.3 %  Auto Eosinophil % : 1.8 %  Auto Basophil % : 0.3 %      05-31    137  |  100  |  16  ----------------------------<  110<H>  5.0   |  28  |  0.7    Ca    8.5      31 May 2023 07:31  Mg     2.0     05-31    TPro  5.1<L>  /  Alb  2.9<L>  /  TBili  0.6  /  DBili  x   /  AST  15  /  ALT  13  /  AlkPhos  186<H>  05-31      Microbiology:      Vital Signs Last 24 Hrs  T(C): 36.8 (31 May 2023 04:40), Max: 37.5 (30 May 2023 18:59)  T(F): 98.2 (31 May 2023 04:40), Max: 99.5 (30 May 2023 18:59)  HR: 92 (31 May 2023 09:55) (91 - 107)  BP: 116/76 (31 May 2023 04:40) (109/56 - 120/74)  BP(mean): --  RR: 18 (31 May 2023 09:55) (18 - 20)  SpO2: 96% (31 May 2023 09:55) (95% - 96%)    Parameters below as of 31 May 2023 09:55  Patient On (Oxygen Delivery Method): room air        I&O's Summary        MEDICATIONS  (STANDING):  albuterol/ipratropium for Nebulization 3 milliLiter(s) Nebulizer every 6 hours  atorvastatin 10 milliGRAM(s) Oral at bedtime  budesonide 160 MICROgram(s)/formoterol 4.5 MICROgram(s) Inhaler 2 Puff(s) Inhalation two times a day  dabrafenib 150 milliGRAM(s) Oral every 12 hours  enoxaparin Injectable 40 milliGRAM(s) SubCutaneous every 24 hours  levothyroxine 200 MICROGram(s) Oral daily  magnesium oxide 400 milliGRAM(s) Oral three times a day with meals  metoprolol tartrate 12.5 milliGRAM(s) Oral two times a day  mirtazapine 15 milliGRAM(s) Oral at bedtime  montelukast 10 milliGRAM(s) Oral daily  pantoprazole    Tablet 40 milliGRAM(s) Oral before breakfast  polyethylene glycol 3350 17 Gram(s) Oral daily  predniSONE   Tablet 10 milliGRAM(s) Oral two times a day  senna 2 Tablet(s) Oral at bedtime  tamsulosin 0.4 milliGRAM(s) Oral at bedtime  trametinib 2 milliGRAM(s) Oral daily  trimethoprim  160 mG/sulfamethoxazole 800 mG 1 Tablet(s) Oral <User Schedule>     Patient is a 63y old  Male who presents with a chief complaint of inability to ambulate (30 May 2023 06:09)    HPI:  63y Male with PMH of thyroid cancer with mets, h/o of kyphoplasty, h/o recent RLE DVT on lovenox presents from home with inability to ambulate. Patient reports he started to experience intermittent pain in L thigh and went for CT and duplex today. When came back, started to experience unbearable pain in L high and observed some bruising limiting his mobility. Therefore presents to the ED  Denies any fever, chills, headache, neurological deficits, nausea, vomiting, chest pain, palpitations, shortness of breathe, cough, abdominal pain, hematochezia, melena, hematemesis, diarrhea or constipation   (27 May 2023 22:46)    PAST MEDICAL & SURGICAL HISTORY:  Thyroid cancer  HTN (hypertension)  COPD (chronic obstructive pulmonary disease)  Borderline diabetes  H/O thyroidectomy    patient seen and examined independently on morning rounds, chart reviewed and discussed with the medicine resident and on interdisciplinary rounds.      hospital day #4    no overnight events--d/w patient and wife bedside-patient ambulated with PT with RW and did 5 steps up and down (has stairs at home)    PE:  GEN-NAD, AAOx3, oob in chair  PULM- fair air entry, ctab  CVS- +s1/s2 irreg irreg  GI- soft NT ND +bs, + ecchymosis diffuse (appears from lovenox injections)- no rebound, no guarding  EXT- left thigh medial ttp and thigh fullness                  Labs:                        7.8    9.92  )-----------( 240      ( 31 May 2023 07:31 )             24.5     CBC Full  -  ( 31 May 2023 07:31 )  WBC Count : 9.92 K/uL  RBC Count : 2.83 M/uL  Hemoglobin : 7.8 g/dL  Hematocrit : 24.5 %  Platelet Count - Automated : 240 K/uL  Mean Cell Volume : 86.6 fL  Mean Cell Hemoglobin : 27.6 pg  Mean Cell Hemoglobin Concentration : 31.8 g/dL  Auto Neutrophil # : 7.31 K/uL  Auto Lymphocyte # : 1.19 K/uL  Auto Monocyte # : 1.12 K/uL  Auto Eosinophil # : 0.18 K/uL  Auto Basophil # : 0.03 K/uL  Auto Neutrophil % : 73.7 %  Auto Lymphocyte % : 12.0 %  Auto Monocyte % : 11.3 %  Auto Eosinophil % : 1.8 %  Auto Basophil % : 0.3 %      05-31    137  |  100  |  16  ----------------------------<  110<H>  5.0   |  28  |  0.7    Ca    8.5      31 May 2023 07:31  Mg     2.0     05-31    TPro  5.1<L>  /  Alb  2.9<L>  /  TBili  0.6  /  DBili  x   /  AST  15  /  ALT  13  /  AlkPhos  186<H>  05-31      Microbiology:      Vital Signs Last 24 Hrs  T(C): 36.8 (31 May 2023 04:40), Max: 37.5 (30 May 2023 18:59)  T(F): 98.2 (31 May 2023 04:40), Max: 99.5 (30 May 2023 18:59)  HR: 92 (31 May 2023 09:55) (91 - 107)  BP: 116/76 (31 May 2023 04:40) (109/56 - 120/74)  BP(mean): --  RR: 18 (31 May 2023 09:55) (18 - 20)  SpO2: 96% (31 May 2023 09:55) (95% - 96%)    Parameters below as of 31 May 2023 09:55  Patient On (Oxygen Delivery Method): room air        I&O's Summary        MEDICATIONS  (STANDING):  albuterol/ipratropium for Nebulization 3 milliLiter(s) Nebulizer every 6 hours  atorvastatin 10 milliGRAM(s) Oral at bedtime  budesonide 160 MICROgram(s)/formoterol 4.5 MICROgram(s) Inhaler 2 Puff(s) Inhalation two times a day  dabrafenib 150 milliGRAM(s) Oral every 12 hours  enoxaparin Injectable 40 milliGRAM(s) SubCutaneous every 24 hours  levothyroxine 200 MICROGram(s) Oral daily  magnesium oxide 400 milliGRAM(s) Oral three times a day with meals  metoprolol tartrate 12.5 milliGRAM(s) Oral two times a day  mirtazapine 15 milliGRAM(s) Oral at bedtime  montelukast 10 milliGRAM(s) Oral daily  pantoprazole    Tablet 40 milliGRAM(s) Oral before breakfast  polyethylene glycol 3350 17 Gram(s) Oral daily  predniSONE   Tablet 10 milliGRAM(s) Oral two times a day  senna 2 Tablet(s) Oral at bedtime  tamsulosin 0.4 milliGRAM(s) Oral at bedtime  trametinib 2 milliGRAM(s) Oral daily  trimethoprim  160 mG/sulfamethoxazole 800 mG 1 Tablet(s) Oral <User Schedule>

## 2023-05-31 NOTE — DISCHARGE NOTE PROVIDER - CARE PROVIDER_API CALL
Shelby Gutierrezmeh  Vascular Surgery  [address] [place] [address], [address]  [place] [address]  Phone: [phone]  Fax: [phone]  Follow Up Time: 2 weeks    Anderson Gallegos [address] [address]  Phone: [phone]  Fax: [phone]  Established Patient  Follow Up Time: 2 weeks   Shelby Gutierrez  Vascular Surgery  [address] [facility], [address]  [place] [address]  Phone: [phone]  Fax: [phone]  Follow Up Time: 2 weeks    Anderson Gallegos  [facility]  [address] [place] [address]  Phone: [phone]  Fax: [phone]  Established Patient  Follow Up Time: 2 weeks    Sinai Jay  Internal Medicine  [address] [place] [address]  Phone: [phone]  Fax: [phone]  Follow Up Time: 1 week

## 2023-05-31 NOTE — DISCHARGE NOTE PROVIDER - PROVIDER TOKENS
PROVIDER:[TOKEN:[[id number]],FOLLOWUP:[2 weeks]],PROVIDER:[TOKEN:[[id number]],FOLLOWUP:[2 weeks],ESTABLISHEDPATIENT:[T]] PROVIDER:[TOKEN:[[id number]],FOLLOWUP:[2 weeks]],PROVIDER:[TOKEN:[[id number]],FOLLOWUP:[2 weeks],ESTABLISHEDPATIENT:[T]],PROVIDER:[TOKEN:[[id number]],FOLLOWUP:[1 week]]

## 2023-05-31 NOTE — PHYSICAL THERAPY INITIAL EVALUATION ADULT - PERTINENT HX OF CURRENT PROBLEM, REHAB EVAL
Held off PT IE secondary to no activity order. Resident Irfan made aware via TEAMS. to f/u as appropriate.
63y Male with PMH of thyroid cancer with mets, h/o of kyphoplasty, h/o recent RLE DVT on lovenox presents from home with inability to ambulate. Patient reports he started to experience intermittent pain in L thigh and went for CT and duplex today. When came back, started to experience unbearable pain in L high and observed some bruising limiting his mobility

## 2023-05-31 NOTE — DISCHARGE NOTE PROVIDER - NSDCCPCAREPLAN_GEN_ALL_CORE_FT
PRINCIPAL DISCHARGE DIAGNOSIS  Diagnosis: Hematoma  Assessment and Plan of Treatment: You came ot the hospital with pain in the leg, and were found to have a collection of blood. Vascular evaluated you, and your hemoglobin was monitored while you were off anticoagulation. Vascular recommended resuming anticoagulation once your hemoglobin was stable, and we are sending you home on Xeralto. We repeated a duplex to look for clots and it was negative for clot. You will need to follow up with vascular in about two weeks to discuss further care.   Please take all medications as prescribed and follow up with your outpatient providers for further management of your care. If you experience worsening of symptoms or new symptoms please return to the ED.  To view your hospital results create a Patient Portal at the following link: [email or web address] or search "Patient Portal [facility]" and follow the instructions on the FollowMyHealth page.     PRINCIPAL DISCHARGE DIAGNOSIS  Diagnosis: Transaminitis  Assessment and Plan of Treatment: You were found to have elevated liver enzymes. You were seen by hepatologist and reccommended workup. Please follow up with your PCP and hepatologist as advised.

## 2023-05-31 NOTE — DISCHARGE NOTE NURSING/CASE MANAGEMENT/SOCIAL WORK - PATIENT PORTAL LINK FT
You can access the FollowMyHealth Patient Portal offered by [facility] by registering at the following website: [email or web address]. By joining [facility]’s FollowMyHealth portal, you will also be able to view your health information using other applications (apps) compatible with our system.

## 2023-05-31 NOTE — DISCHARGE NOTE PROVIDER - NSDCMRMEDTOKEN_GEN_ALL_CORE_FT
Advair Diskus: 2 puff(s) inhaled 2 times a day  Bactrim  mg-160 mg oral tablet: 1 tab(s) orally Monday, Wednesday, and Friday  dabrafenib 75 mg oral capsule: 2 orally 2 times a day  esomeprazole 40 mg oral delayed release capsule: 1 tab(s) orally once a day  levothyroxine 200 mcg (0.2 mg) oral capsule: 1 cap(s) orally once a day  mirtazapine 15 mg oral tablet: 1 tab(s) orally once a day (at bedtime)  pravastatin 40 mg oral tablet: 1 tab(s) orally once a day  predniSONE 10 mg oral tablet: 1 orally 2 times a day  Singulair 10 mg oral tablet: 1 tab(s) orally once a day  Spiriva 18 mcg inhalation capsule: 1 cap(s) inhaled once a day  Toprol-XL 25 mg oral tablet, extended release: 1 tab(s) orally once a day  trametinib 2 mg oral tablet: 1 tab(s) orally once a day  Ventolin HFA 90 mcg/inh inhalation aerosol: 2 puff(s) inhaled 4 times a day  Xarelto Starter Pack 15 mg-20 mg oral kit: 1 tab(s) orally 2 times a day take one 15mg tab twice a day for 21 days, then starting day 22 take one 20mg tab daily   Advair Diskus: 2 puff(s) inhaled 2 times a day  Bactrim  mg-160 mg oral tablet: 1 tab(s) orally Monday, Wednesday, and Friday  dabrafenib 75 mg oral capsule: 2 orally 2 times a day  esomeprazole 40 mg oral delayed release capsule: 1 tab(s) orally once a day  Flomax 0.4 mg oral capsule: 1 orally once a day  levothyroxine 200 mcg (0.2 mg) oral capsule: 1 cap(s) orally once a day  mirtazapine 15 mg oral tablet: 1 tab(s) orally once a day (at bedtime)  Percocet 10 mg-325 mg oral tablet: 1 orally every 4 hours as needed for  severe pain  pravastatin 40 mg oral tablet: 1 tab(s) orally once a day  predniSONE 10 mg oral tablet: 1 orally 2 times a day  Singulair 10 mg oral tablet: 1 tab(s) orally once a day  Spiriva 18 mcg inhalation capsule: 1 cap(s) inhaled once a day  Toprol-XL 25 mg oral tablet, extended release: 1 tab(s) orally once a day  trametinib 2 mg oral tablet: 1 tab(s) orally once a day  Ventolin HFA 90 mcg/inh inhalation aerosol: 2 puff(s) inhaled 4 times a day  Xarelto Starter Pack 15 mg-20 mg oral kit: 1 tab(s) orally 2 times a day take one 15mg tab twice a day for 21 days, then starting day 22 take one 20mg tab daily   Advair Diskus: 2 puff(s) inhaled 2 times a day  Bactrim  mg-160 mg oral tablet: 1 tab(s) orally Monday, Wednesday, and Friday  dabrafenib 75 mg oral capsule: 2 orally 2 times a day  esomeprazole 40 mg oral delayed release capsule: 1 tab(s) orally once a day  Flomax 0.4 mg oral capsule: 1 orally once a day  levothyroxine 200 mcg (0.2 mg) oral capsule: 1 cap(s) orally once a day  mirtazapine 15 mg oral tablet: 1 tab(s) orally once a day (at bedtime)  Percocet 10 mg-325 mg oral tablet: 1 orally every 4 hours as needed for  severe pain  pravastatin 40 mg oral tablet: 1 tab(s) orally once a day  predniSONE 10 mg oral tablet: 1 orally 2 times a day  Singulair 10 mg oral tablet: 1 tab(s) orally once a day  Spiriva 18 mcg inhalation capsule: 1 cap(s) inhaled once a day  Toprol-XL 25 mg oral tablet, extended release: 1 tab(s) orally once a day  trametinib 2 mg oral tablet: 1 tab(s) orally once a day  ursodiol 500 mg oral tablet: 1 tab(s) orally once a day  Ventolin HFA 90 mcg/inh inhalation aerosol: 2 puff(s) inhaled 4 times a day  Xarelto Starter Pack 15 mg-20 mg oral kit: 1 tab(s) orally 2 times a day take one 15mg tab twice a day for 21 days, then starting day 22 take one 20mg tab daily

## 2023-05-31 NOTE — PROGRESS NOTE ADULT - ASSESSMENT
63yM w/ PMHx of thyroid cancer with mets, h/o kyphoplasty, recent DVT in RLE on lovenox presents to the ED for atraumatic left thigh pain that began a few days ago. Physical exam findings, imaging, and labs as documented above.     PLAN:  #Left Thigh Hematoma   - No acute surgical intervention required at this time   - Monitor vitals   - Serial CBC Q6H , monitor Hgb   - Recommend compression to left thigh   - Pain control   - Per vascular surgery, DVT ppx   - PT/Rehab   - Recall surgery PRN    [facility]  SPECTRA [id number]

## 2023-05-31 NOTE — PROGRESS NOTE ADULT - SUBJECTIVE AND OBJECTIVE BOX
JOAQUIN HERNANDEZ 63y Male  MRN#: [id number]     Hospital Day: 4d    CC:  L thigh hematoma    HOSPITAL COURSE:   63M. PMH: thyroid cancer with mets (Follows at [facility]; on trametinib, dabrafenib), h/o of kyphoplasty 2/ bone mets, h/o recent RLE DVT on lovenox, HTN, COPD, Hypothyroidism   Presented 5/27 from home with inability to ambulate. Patient reports he started to experience intermittent pain in L thigh and went for CT and duplex today. When came back, started to experience unbearable pain in L high and observed some bruising limiting his mobility.    VS WNL  Labs notable for hgb 7.5 (11.6 in 04/2023), Na 131, K 5.4 hemolyzed (rpt WNL)  CTAP/CT thigh showed hematoma in L rectus femoris    Admitted to SDU for L thigh hematoma  Hgb downtrended to 6.5 - completed 1u pRBCs, now 7.4  Vascular on board, was considering IVC filter, but pt not a candidate since no DVT on LE duplex. Recommending DVT ppx, but no full AC for now. Surgery following - no acute intervention.   5/30: Monitor for 24-48H while on DVT ppx for signs of bleeding, and ensure hgb remains stable.     SUBJECTIVE     Overnight events  None    Subjective complaints                                               ----------------------------------------------------------  OBJECTIVE  PAST MEDICAL & SURGICAL HISTORY  Thyroid cancer    HTN (hypertension)    COPD (chronic obstructive pulmonary disease)    Borderline diabetes    H/O thyroidectomy                                              -----------------------------------------------------------  ALLERGIES:  Allergy Status Unknown                                            ------------------------------------------------------------    HOME MEDICATIONS  Home Medications:  Advair Diskus: 2 puff(s) inhaled 2 times a day (07 Feb 2020 07:22)  Bactrim  mg-160 mg oral tablet: 1 tab(s) orally Monday, Wednesday, and Friday (28 May 2023 02:01)  dabrafenib 75 mg oral capsule: 2 orally 2 times a day (20 Apr 2023 03:36)  esomeprazole 40 mg oral delayed release capsule: 1 tab(s) orally once a day (28 May 2023 01:59)  levothyroxine 200 mcg (0.2 mg) oral capsule: 1 cap(s) orally once a day (28 May 2023 02:02)  mirtazapine 15 mg oral tablet: 1 tab(s) orally once a day (at bedtime) (28 May 2023 01:59)  pravastatin 40 mg oral tablet: 1 tab(s) orally once a day (23 Nov 2019 18:22)  predniSONE 10 mg oral tablet: 1 orally 2 times a day (20 Apr 2023 03:35)  Singulair 10 mg oral tablet: 1 tab(s) orally once a day (23 Nov 2019 18:22)  Spiriva 18 mcg inhalation capsule: 1 cap(s) inhaled once a day (23 Nov 2019 18:22)  Toprol-XL 25 mg oral tablet, extended release: 1 tab(s) orally once a day (23 Nov 2019 18:22)  trametinib 2 mg oral tablet: 1 tab(s) orally once a day (28 May 2023 02:01)  Ventolin HFA 90 mcg/inh inhalation aerosol: 2 puff(s) inhaled 4 times a day (23 Nov 2019 18:22)                           MEDICATIONS:  STANDING MEDICATIONS  albuterol/ipratropium for Nebulization 3 milliLiter(s) Nebulizer every 6 hours  atorvastatin 10 milliGRAM(s) Oral at bedtime  budesonide 160 MICROgram(s)/formoterol 4.5 MICROgram(s) Inhaler 2 Puff(s) Inhalation two times a day  dabrafenib 150 milliGRAM(s) Oral every 12 hours  enoxaparin Injectable 40 milliGRAM(s) SubCutaneous every 24 hours  levothyroxine 200 MICROGram(s) Oral daily  magnesium oxide 400 milliGRAM(s) Oral three times a day with meals  metoprolol tartrate 12.5 milliGRAM(s) Oral two times a day  mirtazapine 15 milliGRAM(s) Oral at bedtime  montelukast 10 milliGRAM(s) Oral daily  pantoprazole    Tablet 40 milliGRAM(s) Oral before breakfast  polyethylene glycol 3350 17 Gram(s) Oral daily  predniSONE   Tablet 10 milliGRAM(s) Oral two times a day  senna 2 Tablet(s) Oral at bedtime  tamsulosin 0.4 milliGRAM(s) Oral at bedtime  trametinib 2 milliGRAM(s) Oral daily  trimethoprim  160 mG/sulfamethoxazole 800 mG 1 Tablet(s) Oral <User Schedule>    PRN MEDICATIONS  albuterol    90 MICROgram(s) HFA Inhaler 2 Puff(s) Inhalation every 6 hours PRN  ondansetron    Tablet 4 milliGRAM(s) Oral every 8 hours PRN  oxycodone    5 mG/acetaminophen 325 mG 2 Tablet(s) Oral every 4 hours PRN                                            ------------------------------------------------------------  VITAL SIGNS: Last 24 Hours  T(C): 36.8 (31 May 2023 04:40), Max: 37.5 (30 May 2023 18:59)  T(F): 98.2 (31 May 2023 04:40), Max: 99.5 (30 May 2023 18:59)  HR: 91 (31 May 2023 04:40) (91 - 107)  BP: 116/76 (31 May 2023 04:40) (106/68 - 120/74)  BP(mean): --  RR: 18 (31 May 2023 04:40) (18 - 20)  SpO2: 96% (30 May 2023 19:37) (95% - 96%)                                             --------------------------------------------------------------  LABS:                        8.1    10.35 )-----------( 247      ( 30 May 2023 17:42 )             25.6     05-30    140  |  100  |  18  ----------------------------<  129<H>  4.5   |  31  |  0.7    Ca    8.7      30 May 2023 07:00  Mg     2.0     05-30    TPro  5.3<L>  /  Alb  3.1<L>  /  TBili  0.3  /  DBili  x   /  AST  20  /  ALT  14  /  AlkPhos  179<H>  05-30                                                              -------------------------------------------------------------  RADIOLOGY:                                            --------------------------------------------------------------    PHYSICAL EXAM:                                             --------------------------------------------------------------                 JOAQUIN HERNANDEZ 63y Male  MRN#: [id number]     Hospital Day: 4d    CC:  L thigh hematoma    HOSPITAL COURSE:   63M. PMH: thyroid cancer with mets (Follows at [facility]; on trametinib, dabrafenib), h/o of kyphoplasty 2/ bone mets, h/o recent RLE DVT on lovenox, HTN, COPD, Hypothyroidism   Presented 5/27 from home with inability to ambulate. Patient reports he started to experience intermittent pain in L thigh and went for CT and duplex today. When came back, started to experience unbearable pain in L high and observed some bruising limiting his mobility.    VS WNL  Labs notable for hgb 7.5 (11.6 in 04/2023), Na 131, K 5.4 hemolyzed (rpt WNL)  CTAP/CT thigh showed hematoma in L rectus femoris    Admitted to SDU for L thigh hematoma  Hgb downtrended to 6.5 - completed 1u pRBCs, now 7.4  Vascular on board, was considering IVC filter, but pt not a candidate since no DVT on LE duplex. Recommending DVT ppx, but no full AC for now. Surgery following - no acute intervention.   5/30: Monitor for 24-48H while on DVT ppx for signs of bleeding, and ensure hgb remains stable.     SUBJECTIVE     Overnight events  None    Subjective complaints                                               ----------------------------------------------------------  OBJECTIVE  PAST MEDICAL & SURGICAL HISTORY  Thyroid cancer    HTN (hypertension)    COPD (chronic obstructive pulmonary disease)    Borderline diabetes    H/O thyroidectomy                                              -----------------------------------------------------------  ALLERGIES:  Allergy Status Unknown                                            ------------------------------------------------------------    HOME MEDICATIONS  Home Medications:  Advair Diskus: 2 puff(s) inhaled 2 times a day (07 Feb 2020 07:22)  Bactrim  mg-160 mg oral tablet: 1 tab(s) orally Monday, Wednesday, and Friday (28 May 2023 02:01)  dabrafenib 75 mg oral capsule: 2 orally 2 times a day (20 Apr 2023 03:36)  esomeprazole 40 mg oral delayed release capsule: 1 tab(s) orally once a day (28 May 2023 01:59)  levothyroxine 200 mcg (0.2 mg) oral capsule: 1 cap(s) orally once a day (28 May 2023 02:02)  mirtazapine 15 mg oral tablet: 1 tab(s) orally once a day (at bedtime) (28 May 2023 01:59)  pravastatin 40 mg oral tablet: 1 tab(s) orally once a day (23 Nov 2019 18:22)  predniSONE 10 mg oral tablet: 1 orally 2 times a day (20 Apr 2023 03:35)  Singulair 10 mg oral tablet: 1 tab(s) orally once a day (23 Nov 2019 18:22)  Spiriva 18 mcg inhalation capsule: 1 cap(s) inhaled once a day (23 Nov 2019 18:22)  Toprol-XL 25 mg oral tablet, extended release: 1 tab(s) orally once a day (23 Nov 2019 18:22)  trametinib 2 mg oral tablet: 1 tab(s) orally once a day (28 May 2023 02:01)  Ventolin HFA 90 mcg/inh inhalation aerosol: 2 puff(s) inhaled 4 times a day (23 Nov 2019 18:22)                           MEDICATIONS:  STANDING MEDICATIONS  albuterol/ipratropium for Nebulization 3 milliLiter(s) Nebulizer every 6 hours  atorvastatin 10 milliGRAM(s) Oral at bedtime  budesonide 160 MICROgram(s)/formoterol 4.5 MICROgram(s) Inhaler 2 Puff(s) Inhalation two times a day  dabrafenib 150 milliGRAM(s) Oral every 12 hours  enoxaparin Injectable 40 milliGRAM(s) SubCutaneous every 24 hours  levothyroxine 200 MICROGram(s) Oral daily  magnesium oxide 400 milliGRAM(s) Oral three times a day with meals  metoprolol tartrate 12.5 milliGRAM(s) Oral two times a day  mirtazapine 15 milliGRAM(s) Oral at bedtime  montelukast 10 milliGRAM(s) Oral daily  pantoprazole    Tablet 40 milliGRAM(s) Oral before breakfast  polyethylene glycol 3350 17 Gram(s) Oral daily  predniSONE   Tablet 10 milliGRAM(s) Oral two times a day  senna 2 Tablet(s) Oral at bedtime  tamsulosin 0.4 milliGRAM(s) Oral at bedtime  trametinib 2 milliGRAM(s) Oral daily  trimethoprim  160 mG/sulfamethoxazole 800 mG 1 Tablet(s) Oral <User Schedule>    PRN MEDICATIONS  albuterol    90 MICROgram(s) HFA Inhaler 2 Puff(s) Inhalation every 6 hours PRN  ondansetron    Tablet 4 milliGRAM(s) Oral every 8 hours PRN  oxycodone    5 mG/acetaminophen 325 mG 2 Tablet(s) Oral every 4 hours PRN                                            ------------------------------------------------------------  VITAL SIGNS: Last 24 Hours  T(C): 36.8 (31 May 2023 04:40), Max: 37.5 (30 May 2023 18:59)  T(F): 98.2 (31 May 2023 04:40), Max: 99.5 (30 May 2023 18:59)  HR: 91 (31 May 2023 04:40) (91 - 107)  BP: 116/76 (31 May 2023 04:40) (106/68 - 120/74)  BP(mean): --  RR: 18 (31 May 2023 04:40) (18 - 20)  SpO2: 96% (30 May 2023 19:37) (95% - 96%)                                             --------------------------------------------------------------  LABS:                        8.1    10.35 )-----------( 247      ( 30 May 2023 17:42 )             25.6     05-30    140  |  100  |  18  ----------------------------<  129<H>  4.5   |  31  |  0.7    Ca    8.7      30 May 2023 07:00  Mg     2.0     05-30    TPro  5.3<L>  /  Alb  3.1<L>  /  TBili  0.3  /  DBili  x   /  AST  20  /  ALT  14  /  AlkPhos  179<H>  05-30                                                              -------------------------------------------------------------  RADIOLOGY:                                            --------------------------------------------------------------    PHYSICAL EXAM:  GEN: NAD  NEURO: Alert & Orientedx4,   HEENT: nontraumatic, normocephalic, neck supple  CARD: S1, S2 audible, no S3, regular rate and rhythm, no murmur  PULM: B/L breath sounds, no wheezing, crackles, or rales  ABD: Soft, nondistended, nontender  EXTR: tenderness to LLE, left thigh in ACE wrap                                           --------------------------------------------------------------                 JOAQUIN HERNANDEZ 63y Male  MRN#: [id number]     Hospital Day: 4d    CC:  L thigh hematoma    HOSPITAL COURSE:   63M. PMH: thyroid cancer with mets (Follows at [facility]; on trametinib, dabrafenib), h/o of kyphoplasty 2/ bone mets, h/o recent RLE DVT on lovenox, HTN, COPD, Hypothyroidism   Presented 5/27 from home with inability to ambulate. Patient reports he started to experience intermittent pain in L thigh and went for CT and duplex today. When came back, started to experience unbearable pain in L high and observed some bruising limiting his mobility.    VS WNL  Labs notable for hgb 7.5 (11.6 in 04/2023), Na 131, K 5.4 hemolyzed (rpt WNL)  CTAP/CT thigh showed hematoma in L rectus femoris    Admitted to SDU for L thigh hematoma  Hgb downtrended to 6.5 - completed 1u pRBCs, now 7.4  Vascular on board, was considering IVC filter, but pt not a candidate since no DVT on LE duplex. Recommending DVT ppx, but no full AC for now. Surgery following - no acute intervention.   5/30: Monitor for 24-48H while on DVT ppx for signs of bleeding, and ensure hgb remains stable.     SUBJECTIVE     Overnight events  None    Subjective complaints   Pt evaluated at bedside. No active complaints.                                            ----------------------------------------------------------  OBJECTIVE  PAST MEDICAL & SURGICAL HISTORY  Thyroid cancer    HTN (hypertension)    COPD (chronic obstructive pulmonary disease)    Borderline diabetes    H/O thyroidectomy                                              -----------------------------------------------------------  ALLERGIES:  Allergy Status Unknown                                            ------------------------------------------------------------    HOME MEDICATIONS  Home Medications:  Advair Diskus: 2 puff(s) inhaled 2 times a day (07 Feb 2020 07:22)  Bactrim  mg-160 mg oral tablet: 1 tab(s) orally Monday, Wednesday, and Friday (28 May 2023 02:01)  dabrafenib 75 mg oral capsule: 2 orally 2 times a day (20 Apr 2023 03:36)  esomeprazole 40 mg oral delayed release capsule: 1 tab(s) orally once a day (28 May 2023 01:59)  levothyroxine 200 mcg (0.2 mg) oral capsule: 1 cap(s) orally once a day (28 May 2023 02:02)  mirtazapine 15 mg oral tablet: 1 tab(s) orally once a day (at bedtime) (28 May 2023 01:59)  pravastatin 40 mg oral tablet: 1 tab(s) orally once a day (23 Nov 2019 18:22)  predniSONE 10 mg oral tablet: 1 orally 2 times a day (20 Apr 2023 03:35)  Singulair 10 mg oral tablet: 1 tab(s) orally once a day (23 Nov 2019 18:22)  Spiriva 18 mcg inhalation capsule: 1 cap(s) inhaled once a day (23 Nov 2019 18:22)  Toprol-XL 25 mg oral tablet, extended release: 1 tab(s) orally once a day (23 Nov 2019 18:22)  trametinib 2 mg oral tablet: 1 tab(s) orally once a day (28 May 2023 02:01)  Ventolin HFA 90 mcg/inh inhalation aerosol: 2 puff(s) inhaled 4 times a day (23 Nov 2019 18:22)                           MEDICATIONS:  STANDING MEDICATIONS  albuterol/ipratropium for Nebulization 3 milliLiter(s) Nebulizer every 6 hours  atorvastatin 10 milliGRAM(s) Oral at bedtime  budesonide 160 MICROgram(s)/formoterol 4.5 MICROgram(s) Inhaler 2 Puff(s) Inhalation two times a day  dabrafenib 150 milliGRAM(s) Oral every 12 hours  enoxaparin Injectable 40 milliGRAM(s) SubCutaneous every 24 hours  levothyroxine 200 MICROGram(s) Oral daily  magnesium oxide 400 milliGRAM(s) Oral three times a day with meals  metoprolol tartrate 12.5 milliGRAM(s) Oral two times a day  mirtazapine 15 milliGRAM(s) Oral at bedtime  montelukast 10 milliGRAM(s) Oral daily  pantoprazole    Tablet 40 milliGRAM(s) Oral before breakfast  polyethylene glycol 3350 17 Gram(s) Oral daily  predniSONE   Tablet 10 milliGRAM(s) Oral two times a day  senna 2 Tablet(s) Oral at bedtime  tamsulosin 0.4 milliGRAM(s) Oral at bedtime  trametinib 2 milliGRAM(s) Oral daily  trimethoprim  160 mG/sulfamethoxazole 800 mG 1 Tablet(s) Oral <User Schedule>    PRN MEDICATIONS  albuterol    90 MICROgram(s) HFA Inhaler 2 Puff(s) Inhalation every 6 hours PRN  ondansetron    Tablet 4 milliGRAM(s) Oral every 8 hours PRN  oxycodone    5 mG/acetaminophen 325 mG 2 Tablet(s) Oral every 4 hours PRN                                            ------------------------------------------------------------  VITAL SIGNS: Last 24 Hours  T(C): 36.8 (31 May 2023 04:40), Max: 37.5 (30 May 2023 18:59)  T(F): 98.2 (31 May 2023 04:40), Max: 99.5 (30 May 2023 18:59)  HR: 91 (31 May 2023 04:40) (91 - 107)  BP: 116/76 (31 May 2023 04:40) (106/68 - 120/74)  BP(mean): --  RR: 18 (31 May 2023 04:40) (18 - 20)  SpO2: 96% (30 May 2023 19:37) (95% - 96%)                                             --------------------------------------------------------------  LABS:                        8.1    10.35 )-----------( 247      ( 30 May 2023 17:42 )             25.6     05-30    140  |  100  |  18  ----------------------------<  129<H>  4.5   |  31  |  0.7    Ca    8.7      30 May 2023 07:00  Mg     2.0     05-30    TPro  5.3<L>  /  Alb  3.1<L>  /  TBili  0.3  /  DBili  x   /  AST  20  /  ALT  14  /  AlkPhos  179<H>  05-30                                                              -------------------------------------------------------------  RADIOLOGY:                                            --------------------------------------------------------------    PHYSICAL EXAM:  GEN: NAD  NEURO: Alert & Orientedx4,   HEENT: nontraumatic, normocephalic, neck supple  CARD: S1, S2 audible, no S3, regular rate and rhythm, no murmur  PULM: B/L breath sounds, no wheezing, crackles, or rales  ABD: Soft, nondistended, nontender  EXTR: tenderness to LLE, left thigh in ACE wrap                                           --------------------------------------------------------------

## 2023-05-31 NOTE — DISCHARGE NOTE NURSING/CASE MANAGEMENT/SOCIAL WORK - [id number]_GEN_ALL_CORE
For information on Fall & Injury Prevention, visit: [email or web address] OR  [email or web address] OR  https://www.cdc.gov/steadi/patient.html

## 2023-05-31 NOTE — DISCHARGE NOTE PROVIDER - HOSPITAL COURSE
CC:  L thigh hematoma    HOSPITAL COURSE:   63M. PMH: thyroid cancer with mets (Follows at [facility]; on trametinib, dabrafenib), h/o of kyphoplasty 2/ bone mets, h/o recent RLE DVT on lovenox, HTN, COPD, Hypothyroidism   Presented 5/27 from home with inability to ambulate. Patient reports he started to experience intermittent pain in L thigh and went for CT and duplex today. When came back, started to experience unbearable pain in L high and observed some bruising limiting his mobility.    VS WNL  Labs notable for hgb 7.5 (11.6 in 04/2023), Na 131, K 5.4 hemolyzed (rpt WNL)  CTAP/CT thigh showed hematoma in L rectus femoris    Admitted to SDU for L thigh hematoma  Hgb downtrended to 6.5 - completed 1u pRBCs, now 7.4  Vascular on board, was considering IVC filter, but pt not a candidate since no DVT on LE duplex. Recommending DVT ppx, but no full AC for now. Recommend to start AC once Hgb stabilizes. Surgery following - no acute intervention.   5/30: Monitor for 24-48H while on DVT ppx for signs of bleeding, and ensure hgb remains stable.   6/1: Pt noted to have spiked fevers overnight - septic workup to be obtained. UA (-) CXR unchanged, pt asx    #Fever 5/31 likely medication related  - Noted 100.6 5/31, no leukocytosis or leukopenia - Pt reports he typically takes his chemo medications with tylenol and prednisone as he has had fevers in the past; however 5/31 he took his medication late and did not take it with Tylenol and could be explanation for fever.   - F/u BCx, RVP  - UA (6/1) (-), CXR unchaged     #L rectus femoris hematoma  #Acute Anemia  #Recent DVT (~3-4wks prior to admission)  - Hgb downtrended to 6.5 (5/28) - completed 1u pRBCs, now 7.4, given 2nd 1u pRBCs (5/30) for hgb 7.1 -> now 8.1  - CT LLE (5/27): Intramuscular hematoma within the rectus femoris measuring 14.9 x 5 x 1.9cm  - Duplex BLLE Venous (5/27): (-) for DVT  *****  - surgery recs no surgical intervention, compression to thigh  - Vacular Sx on board - not a candidate since no DVT on LE duplex; Patient may remain off of anticoagulation, recommend DVT ppx while inpatient; : Would recommend resuming AC when the Hg and hematoma is stable  ON DISCHARGE  - Will Transition to xeralto for AC on DC   - Repeat venous duplex in 2 weeks to eval DVT with outpatient follow up with vascular surgery ([phone], Dr. Poon)    #Thyroid CA with mets  *F/w [facility]  - lytic lesions in b/l kidneys on CTAP  - C/w home non-formulary trametinib, dabrafenib (pt has his own supply, is taking)  - C/w home Prednisone 10mg BID, Bactrim ppx   - C/w home percocet PRN (allergic to dilaudid and morphine. Causes decreased respiratory drive)    #Paralyzed phrenic nerve  #COPD - not in exacerbation  - c/w home inhalers    #HTN - c/w metoprolol xl  #HLD - start lipitor  #Hypothyroidism - on synthroid. CC:  L thigh hematoma    HOSPITAL COURSE:   63M. PMH: thyroid cancer with mets (Follows at [facility]; on trametinib, dabrafenib), h/o of kyphoplasty 2/ bone mets, h/o recent RLE DVT on lovenox, HTN, COPD, Hypothyroidism   Presented 5/27 from home with inability to ambulate. Patient reports he started to experience intermittent pain in L thigh and went for CT and duplex today. When came back, started to experience unbearable pain in L high and observed some bruising limiting his mobility.    VS WNL  Labs notable for hgb 7.5 (11.6 in 04/2023), Na 131, K 5.4 hemolyzed (rpt WNL)  CTAP/CT thigh showed hematoma in L rectus femoris    Admitted to SDU for L thigh hematoma  Hgb downtrended to 6.5 - completed 1u pRBCs  Vascular on board, was considering IVC filter, but pt not a candidate since no DVT on LE duplex. .     #Fever 5/31  - Noted 100.6 5/31, no leukocytosis or leukopenia - Pt reports he typically takes his chemo medications with tylenol and prednisone as he has had fevers in the past; however 5/31 he took his medication late and did not take it with Tylenol and could be explanation for fever.        #L rectus femoris hematoma  #Acute Anemia  #Recent DVT (~3-4wks prior to admission)  - Hgb downtrended to 6.5 (5/28) - completed 1u pRBCs, now 7.4, given 2nd 1u pRBCs (5/30) for hgb 7.1 -> now 8.1  - CT LLE (5/27): Intramuscular hematoma within the rectus femoris measuring 14.9 x 5 x 1.9cm  - Duplex BLLE Venous (5/27): (-) for DVT  *****  - surgery recs no surgical intervention, compression to thigh  - Vacular Sx on board - not a candidate since no DVT on LE duplex; Patient may remain off of anticoagulation, recommend DVT ppx while inpatient; : Would recommend resuming AC when the Hg and hematoma is stable  ON DISCHARGE  - Will Transition to xeralto for AC on DC   - Repeat venous duplex in 2 weeks to eval DVT with outpatient follow up with vascular surgery ([phone], Dr. Poon)    #Thyroid CA with mets  *F/w [facility]  - lytic lesions in b/l kidneys on CTAP  - C/w home non-formulary trametinib, dabrafenib (pt has his own supply, is taking)  - C/w home Prednisone 10mg BID, Bactrim ppx   - C/w home percocet PRN (allergic to dilaudid and morphine. Causes decreased respiratory drive)    #Paralyzed phrenic nerve  #COPD - not in exacerbation  - c/w home inhalers    #HTN - c/w metoprolol xl  #HLD - start lipitor  #Hypothyroidism - on synthroid.     On 6/1 pt has transaminitis, pt was seen by hepatology and recommended Extensive workup. Pt will follow up with hepatology as outpt with lab results.    Pt is VS and afebrile for more than 48 hrs

## 2023-06-01 LAB
ALBUMIN SERPL ELPH-MCNC: 3.1 G/DL — LOW (ref 3.5–5.2)
ALBUMIN SERPL ELPH-MCNC: 3.1 G/DL — LOW (ref 3.5–5.2)
ALP SERPL-CCNC: 672 U/L — HIGH (ref 30–115)
ALP SERPL-CCNC: 725 U/L — HIGH (ref 30–115)
ALT FLD-CCNC: 100 U/L — HIGH (ref 0–41)
ALT FLD-CCNC: 75 U/L — HIGH (ref 0–41)
ANION GAP SERPL CALC-SCNC: 12 MMOL/L — SIGNIFICANT CHANGE UP (ref 7–14)
ANION GAP SERPL CALC-SCNC: 9 MMOL/L — SIGNIFICANT CHANGE UP (ref 7–14)
APPEARANCE UR: CLEAR — SIGNIFICANT CHANGE UP
AST SERPL-CCNC: 180 U/L — HIGH (ref 0–41)
AST SERPL-CCNC: 211 U/L — HIGH (ref 0–41)
BASOPHILS # BLD AUTO: 0.03 K/UL — SIGNIFICANT CHANGE UP (ref 0–0.2)
BASOPHILS # BLD AUTO: 0.03 K/UL — SIGNIFICANT CHANGE UP (ref 0–0.2)
BASOPHILS NFR BLD AUTO: 0.3 % — SIGNIFICANT CHANGE UP (ref 0–1)
BASOPHILS NFR BLD AUTO: 0.3 % — SIGNIFICANT CHANGE UP (ref 0–1)
BILIRUB SERPL-MCNC: 1.8 MG/DL — HIGH (ref 0.2–1.2)
BILIRUB SERPL-MCNC: 2.7 MG/DL — HIGH (ref 0.2–1.2)
BILIRUB UR-MCNC: NEGATIVE — SIGNIFICANT CHANGE UP
BUN SERPL-MCNC: 17 MG/DL — SIGNIFICANT CHANGE UP (ref 10–20)
BUN SERPL-MCNC: 17 MG/DL — SIGNIFICANT CHANGE UP (ref 10–20)
CALCIUM SERPL-MCNC: 8.4 MG/DL — SIGNIFICANT CHANGE UP (ref 8.4–10.5)
CALCIUM SERPL-MCNC: 8.5 MG/DL — SIGNIFICANT CHANGE UP (ref 8.4–10.5)
CHLORIDE SERPL-SCNC: 100 MMOL/L — SIGNIFICANT CHANGE UP (ref 98–110)
CHLORIDE SERPL-SCNC: 98 MMOL/L — SIGNIFICANT CHANGE UP (ref 98–110)
CO2 SERPL-SCNC: 26 MMOL/L — SIGNIFICANT CHANGE UP (ref 17–32)
CO2 SERPL-SCNC: 29 MMOL/L — SIGNIFICANT CHANGE UP (ref 17–32)
COLOR SPEC: YELLOW — SIGNIFICANT CHANGE UP
CREAT SERPL-MCNC: 0.7 MG/DL — SIGNIFICANT CHANGE UP (ref 0.7–1.5)
CREAT SERPL-MCNC: 0.8 MG/DL — SIGNIFICANT CHANGE UP (ref 0.7–1.5)
DIFF PNL FLD: NEGATIVE — SIGNIFICANT CHANGE UP
EGFR: 100 ML/MIN/1.73M2 — SIGNIFICANT CHANGE UP
EGFR: 104 ML/MIN/1.73M2 — SIGNIFICANT CHANGE UP
EOSINOPHIL # BLD AUTO: 0.17 K/UL — SIGNIFICANT CHANGE UP (ref 0–0.7)
EOSINOPHIL # BLD AUTO: 0.19 K/UL — SIGNIFICANT CHANGE UP (ref 0–0.7)
EOSINOPHIL NFR BLD AUTO: 1.6 % — SIGNIFICANT CHANGE UP (ref 0–8)
EOSINOPHIL NFR BLD AUTO: 1.9 % — SIGNIFICANT CHANGE UP (ref 0–8)
GLUCOSE SERPL-MCNC: 106 MG/DL — HIGH (ref 70–99)
GLUCOSE SERPL-MCNC: 107 MG/DL — HIGH (ref 70–99)
GLUCOSE UR QL: NEGATIVE — SIGNIFICANT CHANGE UP
HCT VFR BLD CALC: 24.8 % — LOW (ref 42–52)
HCT VFR BLD CALC: 25.3 % — LOW (ref 42–52)
HGB BLD-MCNC: 7.8 G/DL — LOW (ref 14–18)
HGB BLD-MCNC: 8 G/DL — LOW (ref 14–18)
IMM GRANULOCYTES NFR BLD AUTO: 1.3 % — HIGH (ref 0.1–0.3)
IMM GRANULOCYTES NFR BLD AUTO: 1.3 % — HIGH (ref 0.1–0.3)
KETONES UR-MCNC: NEGATIVE — SIGNIFICANT CHANGE UP
LEUKOCYTE ESTERASE UR-ACNC: NEGATIVE — SIGNIFICANT CHANGE UP
LYMPHOCYTES # BLD AUTO: 0.92 K/UL — LOW (ref 1.2–3.4)
LYMPHOCYTES # BLD AUTO: 0.95 K/UL — LOW (ref 1.2–3.4)
LYMPHOCYTES # BLD AUTO: 8.9 % — LOW (ref 20.5–51.1)
LYMPHOCYTES # BLD AUTO: 9.3 % — LOW (ref 20.5–51.1)
MAGNESIUM SERPL-MCNC: 2 MG/DL — SIGNIFICANT CHANGE UP (ref 1.8–2.4)
MCHC RBC-ENTMCNC: 27.2 PG — SIGNIFICANT CHANGE UP (ref 27–31)
MCHC RBC-ENTMCNC: 27.7 PG — SIGNIFICANT CHANGE UP (ref 27–31)
MCHC RBC-ENTMCNC: 31.5 G/DL — LOW (ref 32–37)
MCHC RBC-ENTMCNC: 31.6 G/DL — LOW (ref 32–37)
MCV RBC AUTO: 86.4 FL — SIGNIFICANT CHANGE UP (ref 80–94)
MCV RBC AUTO: 87.5 FL — SIGNIFICANT CHANGE UP (ref 80–94)
MONOCYTES # BLD AUTO: 1.03 K/UL — HIGH (ref 0.1–0.6)
MONOCYTES # BLD AUTO: 1.05 K/UL — HIGH (ref 0.1–0.6)
MONOCYTES NFR BLD AUTO: 10 % — HIGH (ref 1.7–9.3)
MONOCYTES NFR BLD AUTO: 10.3 % — HIGH (ref 1.7–9.3)
NEUTROPHILS # BLD AUTO: 7.84 K/UL — HIGH (ref 1.4–6.5)
NEUTROPHILS # BLD AUTO: 8.07 K/UL — HIGH (ref 1.4–6.5)
NEUTROPHILS NFR BLD AUTO: 76.9 % — HIGH (ref 42.2–75.2)
NEUTROPHILS NFR BLD AUTO: 77.9 % — HIGH (ref 42.2–75.2)
NITRITE UR-MCNC: NEGATIVE — SIGNIFICANT CHANGE UP
NRBC # BLD: 0 /100 WBCS — SIGNIFICANT CHANGE UP (ref 0–0)
NRBC # BLD: 0 /100 WBCS — SIGNIFICANT CHANGE UP (ref 0–0)
PH UR: 8 — SIGNIFICANT CHANGE UP (ref 5–8)
PLATELET # BLD AUTO: 240 K/UL — SIGNIFICANT CHANGE UP (ref 130–400)
PLATELET # BLD AUTO: 250 K/UL — SIGNIFICANT CHANGE UP (ref 130–400)
PMV BLD: 10.1 FL — SIGNIFICANT CHANGE UP (ref 7.4–10.4)
PMV BLD: 9.9 FL — SIGNIFICANT CHANGE UP (ref 7.4–10.4)
POTASSIUM SERPL-MCNC: 4.3 MMOL/L — SIGNIFICANT CHANGE UP (ref 3.5–5)
POTASSIUM SERPL-MCNC: 4.6 MMOL/L — SIGNIFICANT CHANGE UP (ref 3.5–5)
POTASSIUM SERPL-SCNC: 4.3 MMOL/L — SIGNIFICANT CHANGE UP (ref 3.5–5)
POTASSIUM SERPL-SCNC: 4.6 MMOL/L — SIGNIFICANT CHANGE UP (ref 3.5–5)
PROT SERPL-MCNC: 5 G/DL — LOW (ref 6–8)
PROT SERPL-MCNC: 5.1 G/DL — LOW (ref 6–8)
PROT UR-MCNC: SIGNIFICANT CHANGE UP
RAPID RVP RESULT: SIGNIFICANT CHANGE UP
RBC # BLD: 2.87 M/UL — LOW (ref 4.7–6.1)
RBC # BLD: 2.89 M/UL — LOW (ref 4.7–6.1)
RBC # FLD: 19 % — HIGH (ref 11.5–14.5)
RBC # FLD: 19.3 % — HIGH (ref 11.5–14.5)
SARS-COV-2 RNA SPEC QL NAA+PROBE: SIGNIFICANT CHANGE UP
SODIUM SERPL-SCNC: 136 MMOL/L — SIGNIFICANT CHANGE UP (ref 135–146)
SODIUM SERPL-SCNC: 138 MMOL/L — SIGNIFICANT CHANGE UP (ref 135–146)
SP GR SPEC: 1.02 — SIGNIFICANT CHANGE UP (ref 1.01–1.03)
UROBILINOGEN FLD QL: SIGNIFICANT CHANGE UP
WBC # BLD: 10.19 K/UL — SIGNIFICANT CHANGE UP (ref 4.8–10.8)
WBC # BLD: 10.35 K/UL — SIGNIFICANT CHANGE UP (ref 4.8–10.8)
WBC # FLD AUTO: 10.19 K/UL — SIGNIFICANT CHANGE UP (ref 4.8–10.8)
WBC # FLD AUTO: 10.35 K/UL — SIGNIFICANT CHANGE UP (ref 4.8–10.8)

## 2023-06-01 PROCEDURE — 99232 SBSQ HOSP IP/OBS MODERATE 35: CPT

## 2023-06-01 PROCEDURE — 71045 X-RAY EXAM CHEST 1 VIEW: CPT | Mod: 26

## 2023-06-01 RX ORDER — DABRAFENIB 75 MG/1
2 CAPSULE ORAL
Refills: 0 | DISCHARGE

## 2023-06-01 RX ORDER — MAGNESIUM HYDROXIDE 400 MG/1
30 TABLET, CHEWABLE ORAL THREE TIMES A DAY
Refills: 0 | Status: COMPLETED | OUTPATIENT
Start: 2023-06-01 | End: 2023-06-02

## 2023-06-01 RX ORDER — METOPROLOL TARTRATE 50 MG
1 TABLET ORAL
Qty: 0 | Refills: 0 | DISCHARGE

## 2023-06-01 RX ORDER — ALBUTEROL 90 UG/1
2 AEROSOL, METERED ORAL
Qty: 0 | Refills: 0 | DISCHARGE

## 2023-06-01 RX ORDER — OXYCODONE AND ACETAMINOPHEN 5; 325 MG/1; MG/1
1 TABLET ORAL
Refills: 0 | DISCHARGE

## 2023-06-01 RX ORDER — TAMSULOSIN HYDROCHLORIDE 0.4 MG/1
1 CAPSULE ORAL
Refills: 0 | DISCHARGE

## 2023-06-01 RX ORDER — FLUTICASONE PROPIONATE AND SALMETEROL 50; 250 UG/1; UG/1
2 POWDER ORAL; RESPIRATORY (INHALATION)
Qty: 0 | Refills: 0 | DISCHARGE

## 2023-06-01 RX ORDER — MONTELUKAST 4 MG/1
1 TABLET, CHEWABLE ORAL
Qty: 0 | Refills: 0 | DISCHARGE

## 2023-06-01 RX ORDER — TIOTROPIUM BROMIDE 18 UG/1
1 CAPSULE ORAL; RESPIRATORY (INHALATION)
Qty: 0 | Refills: 0 | DISCHARGE

## 2023-06-01 RX ADMIN — DABRAFENIB 150 MILLIGRAM(S): 75 CAPSULE ORAL at 18:08

## 2023-06-01 RX ADMIN — ENOXAPARIN SODIUM 40 MILLIGRAM(S): 100 INJECTION SUBCUTANEOUS at 18:08

## 2023-06-01 RX ADMIN — ATORVASTATIN CALCIUM 10 MILLIGRAM(S): 80 TABLET, FILM COATED ORAL at 21:48

## 2023-06-01 RX ADMIN — TAMSULOSIN HYDROCHLORIDE 0.4 MILLIGRAM(S): 0.4 CAPSULE ORAL at 21:49

## 2023-06-01 RX ADMIN — Medication 200 MICROGRAM(S): at 05:07

## 2023-06-01 RX ADMIN — MIRTAZAPINE 15 MILLIGRAM(S): 45 TABLET, ORALLY DISINTEGRATING ORAL at 21:49

## 2023-06-01 RX ADMIN — MAGNESIUM OXIDE 400 MG ORAL TABLET 400 MILLIGRAM(S): 241.3 TABLET ORAL at 18:08

## 2023-06-01 RX ADMIN — Medication 3 MILLILITER(S): at 13:50

## 2023-06-01 RX ADMIN — PANTOPRAZOLE SODIUM 40 MILLIGRAM(S): 20 TABLET, DELAYED RELEASE ORAL at 05:08

## 2023-06-01 RX ADMIN — DABRAFENIB 150 MILLIGRAM(S): 75 CAPSULE ORAL at 05:36

## 2023-06-01 RX ADMIN — MAGNESIUM OXIDE 400 MG ORAL TABLET 400 MILLIGRAM(S): 241.3 TABLET ORAL at 08:24

## 2023-06-01 RX ADMIN — SENNA PLUS 2 TABLET(S): 8.6 TABLET ORAL at 21:48

## 2023-06-01 RX ADMIN — Medication 10 MILLIGRAM(S): at 05:08

## 2023-06-01 RX ADMIN — ONDANSETRON 4 MILLIGRAM(S): 8 TABLET, FILM COATED ORAL at 04:01

## 2023-06-01 RX ADMIN — Medication 3 MILLILITER(S): at 21:27

## 2023-06-01 RX ADMIN — MONTELUKAST 10 MILLIGRAM(S): 4 TABLET, CHEWABLE ORAL at 11:24

## 2023-06-01 RX ADMIN — Medication 10 MILLIGRAM(S): at 18:08

## 2023-06-01 RX ADMIN — MAGNESIUM OXIDE 400 MG ORAL TABLET 400 MILLIGRAM(S): 241.3 TABLET ORAL at 11:25

## 2023-06-01 RX ADMIN — Medication 3 MILLILITER(S): at 08:44

## 2023-06-01 RX ADMIN — Medication 12.5 MILLIGRAM(S): at 05:08

## 2023-06-01 NOTE — PROGRESS NOTE ADULT - SUBJECTIVE AND OBJECTIVE BOX
Patient is a 63y old  Male who presents with a chief complaint of inability to ambulate (30 May 2023 06:09)    HPI:  63y Male with PMH of thyroid cancer with mets, h/o of kyphoplasty, h/o recent RLE DVT on lovenox presents from home with inability to ambulate. Patient reports he started to experience intermittent pain in L thigh and went for CT and duplex today. When came back, started to experience unbearable pain in L high and observed some bruising limiting his mobility. Therefore presents to the ED  Denies any fever, chills, headache, neurological deficits, nausea, vomiting, chest pain, palpitations, shortness of breathe, cough, abdominal pain, hematochezia, melena, hematemesis, diarrhea or constipation   (27 May 2023 22:46)    PAST MEDICAL & SURGICAL HISTORY:  Thyroid cancer  HTN (hypertension)  COPD (chronic obstructive pulmonary disease)  Borderline diabetes  H/O thyroidectomy    patient seen and examined independently on morning rounds, chart reviewed and discussed with the medicine resident and on interdisciplinary rounds.      hospital day #5    overnight febrile (tmax 100.6)- occurred after taking chemo medications without tylenol (which is why he usually takes tylenol to prevent fever)    PE:  GEN-NAD, AAOx3, oob in chair- wife present  PULM- fair air entry, ctab  CVS- +s1/s2 irreg irreg  GI- soft NT ND +bs, + ecchymosis diffuse (appears from lovenox injections)- no rebound, no guarding  EXT- left thigh medial ttp and thigh fullness but improving                  Labs:                        7.8    10.35 )-----------( 250      ( 01 Jun 2023 11:12 )             24.8     CBC Full  -  ( 01 Jun 2023 11:12 )  WBC Count : 10.35 K/uL  RBC Count : 2.87 M/uL  Hemoglobin : 7.8 g/dL  Hematocrit : 24.8 %  Platelet Count - Automated : 250 K/uL  Mean Cell Volume : 86.4 fL  Mean Cell Hemoglobin : 27.2 pg  Mean Cell Hemoglobin Concentration : 31.5 g/dL  Auto Neutrophil # : 8.07 K/uL  Auto Lymphocyte # : 0.92 K/uL  Auto Monocyte # : 1.03 K/uL  Auto Eosinophil # : 0.17 K/uL  Auto Basophil # : 0.03 K/uL  Auto Neutrophil % : 77.9 %  Auto Lymphocyte % : 8.9 %  Auto Monocyte % : 10.0 %  Auto Eosinophil % : 1.6 %  Auto Basophil % : 0.3 %      06-01    136  |  98  |  17  ----------------------------<  106<H>  4.3   |  29  |  0.8    Ca    8.5      01 Jun 2023 08:59  Mg     2.0     06-01    TPro  5.1<L>  /  Alb  3.1<L>  /  TBili  1.8<H>  /  DBili  x   /  AST  180<H>  /  ALT  75<H>  /  AlkPhos  672<H>  06-01      Microbiology:      Vital Signs Last 24 Hrs  T(C): 37.2 (01 Jun 2023 05:11), Max: 38.1 (31 May 2023 20:53)  T(F): 98.9 (01 Jun 2023 05:11), Max: 100.6 (31 May 2023 20:53)  HR: 104 (01 Jun 2023 05:11) (101 - 105)  BP: 122/85 (01 Jun 2023 05:11) (100/71 - 122/85)  BP(mean): --  RR: 18 (01 Jun 2023 05:11) (18 - 18)  SpO2: --        I&O's Summary      MEDICATIONS  (STANDING):  albuterol/ipratropium for Nebulization 3 milliLiter(s) Nebulizer every 6 hours  atorvastatin 10 milliGRAM(s) Oral at bedtime  budesonide 160 MICROgram(s)/formoterol 4.5 MICROgram(s) Inhaler 2 Puff(s) Inhalation two times a day  dabrafenib 150 milliGRAM(s) Oral every 12 hours  enoxaparin Injectable 40 milliGRAM(s) SubCutaneous every 24 hours  levothyroxine 200 MICROGram(s) Oral daily  magnesium oxide 400 milliGRAM(s) Oral three times a day with meals  metoprolol tartrate 12.5 milliGRAM(s) Oral two times a day  mirtazapine 15 milliGRAM(s) Oral at bedtime  montelukast 10 milliGRAM(s) Oral daily  pantoprazole    Tablet 40 milliGRAM(s) Oral before breakfast  polyethylene glycol 3350 17 Gram(s) Oral daily  predniSONE   Tablet 10 milliGRAM(s) Oral two times a day  senna 2 Tablet(s) Oral at bedtime  tamsulosin 0.4 milliGRAM(s) Oral at bedtime  trametinib 2 milliGRAM(s) Oral daily  trimethoprim  160 mG/sulfamethoxazole 800 mG 1 Tablet(s) Oral <User Schedule>

## 2023-06-01 NOTE — PROGRESS NOTE ADULT - SUBJECTIVE AND OBJECTIVE BOX
JOAQUIN HERNANDEZ 63y Male  MRN#: [id number]     Hospital Day: 5d    CC:  L thigh hematoma    HOSPITAL COURSE:   63M. PMH: thyroid cancer with mets (Follows at [facility]; on trametinib, dabrafenib), h/o of kyphoplasty 2/ bone mets, h/o recent RLE DVT on lovenox, HTN, COPD, Hypothyroidism   Presented 5/27 from home with inability to ambulate. Patient reports he started to experience intermittent pain in L thigh and went for CT and duplex today. When came back, started to experience unbearable pain in L high and observed some bruising limiting his mobility.    VS WNL  Labs notable for hgb 7.5 (11.6 in 04/2023), Na 131, K 5.4 hemolyzed (rpt WNL)  CTAP/CT thigh showed hematoma in L rectus femoris    Admitted to SDU for L thigh hematoma  Hgb downtrended to 6.5 - completed 1u pRBCs, now 7.4  Vascular on board, was considering IVC filter, but pt not a candidate since no DVT on LE duplex. Recommending DVT ppx, but no full AC for now. Recommend to start AC once Hgb stabilizes. Surgery following - no acute intervention.   5/30: Monitor for 24-48H while on DVT ppx for signs of bleeding, and ensure hgb remains stable.     SUBJECTIVE     Overnight events  None    Subjective complaints                                               ----------------------------------------------------------  OBJECTIVE  PAST MEDICAL & SURGICAL HISTORY  Thyroid cancer    HTN (hypertension)    COPD (chronic obstructive pulmonary disease)    Borderline diabetes    H/O thyroidectomy                                              -----------------------------------------------------------  ALLERGIES:  Allergy Status Unknown                                            ------------------------------------------------------------    HOME MEDICATIONS  Home Medications:  Advair Diskus: 2 puff(s) inhaled 2 times a day (07 Feb 2020 07:22)  Bactrim  mg-160 mg oral tablet: 1 tab(s) orally Monday, Wednesday, and Friday (28 May 2023 02:01)  dabrafenib 75 mg oral capsule: 2 orally 2 times a day (20 Apr 2023 03:36)  esomeprazole 40 mg oral delayed release capsule: 1 tab(s) orally once a day (28 May 2023 01:59)  levothyroxine 200 mcg (0.2 mg) oral capsule: 1 cap(s) orally once a day (28 May 2023 02:02)  mirtazapine 15 mg oral tablet: 1 tab(s) orally once a day (at bedtime) (28 May 2023 01:59)  pravastatin 40 mg oral tablet: 1 tab(s) orally once a day (23 Nov 2019 18:22)  predniSONE 10 mg oral tablet: 1 orally 2 times a day (20 Apr 2023 03:35)  Singulair 10 mg oral tablet: 1 tab(s) orally once a day (23 Nov 2019 18:22)  Spiriva 18 mcg inhalation capsule: 1 cap(s) inhaled once a day (23 Nov 2019 18:22)  Toprol-XL 25 mg oral tablet, extended release: 1 tab(s) orally once a day (23 Nov 2019 18:22)  trametinib 2 mg oral tablet: 1 tab(s) orally once a day (28 May 2023 02:01)  Ventolin HFA 90 mcg/inh inhalation aerosol: 2 puff(s) inhaled 4 times a day (23 Nov 2019 18:22)                           MEDICATIONS:  STANDING MEDICATIONS  albuterol/ipratropium for Nebulization 3 milliLiter(s) Nebulizer every 6 hours  atorvastatin 10 milliGRAM(s) Oral at bedtime  budesonide 160 MICROgram(s)/formoterol 4.5 MICROgram(s) Inhaler 2 Puff(s) Inhalation two times a day  dabrafenib 150 milliGRAM(s) Oral every 12 hours  enoxaparin Injectable 40 milliGRAM(s) SubCutaneous every 24 hours  levothyroxine 200 MICROGram(s) Oral daily  magnesium oxide 400 milliGRAM(s) Oral three times a day with meals  metoprolol tartrate 12.5 milliGRAM(s) Oral two times a day  mirtazapine 15 milliGRAM(s) Oral at bedtime  montelukast 10 milliGRAM(s) Oral daily  pantoprazole    Tablet 40 milliGRAM(s) Oral before breakfast  polyethylene glycol 3350 17 Gram(s) Oral daily  predniSONE   Tablet 10 milliGRAM(s) Oral two times a day  senna 2 Tablet(s) Oral at bedtime  tamsulosin 0.4 milliGRAM(s) Oral at bedtime  trametinib 2 milliGRAM(s) Oral daily  trimethoprim  160 mG/sulfamethoxazole 800 mG 1 Tablet(s) Oral <User Schedule>    PRN MEDICATIONS  albuterol    90 MICROgram(s) HFA Inhaler 2 Puff(s) Inhalation every 6 hours PRN  ondansetron    Tablet 4 milliGRAM(s) Oral every 8 hours PRN  oxycodone    5 mG/acetaminophen 325 mG 2 Tablet(s) Oral every 4 hours PRN                                            ------------------------------------------------------------  VITAL SIGNS: Last 24 Hours  T(C): 37.2 (01 Jun 2023 05:11), Max: 38.1 (31 May 2023 20:53)  T(F): 98.9 (01 Jun 2023 05:11), Max: 100.6 (31 May 2023 20:53)  HR: 104 (01 Jun 2023 05:11) (92 - 105)  BP: 122/85 (01 Jun 2023 05:11) (100/71 - 122/85)  BP(mean): --  RR: 18 (01 Jun 2023 05:11) (18 - 18)  SpO2: 96% (31 May 2023 09:55) (96% - 96%)                                             --------------------------------------------------------------  LABS:                        7.8    9.92  )-----------( 240      ( 31 May 2023 07:31 )             24.5     05-31    137  |  100  |  16  ----------------------------<  110<H>  5.0   |  28  |  0.7    Ca    8.5      31 May 2023 07:31  Mg     2.0     05-31    TPro  5.1<L>  /  Alb  2.9<L>  /  TBili  0.6  /  DBili  x   /  AST  15  /  ALT  13  /  AlkPhos  186<H>  05-31                                                              -------------------------------------------------------------  RADIOLOGY:                                            --------------------------------------------------------------    PHYSICAL EXAM:                                             --------------------------------------------------------------                 JOAQUIN HERNANDEZ 63y Male  MRN#: [id number]     Hospital Day: 5d    CC:  L thigh hematoma    HOSPITAL COURSE:   63M. PMH: thyroid cancer with mets (Follows at [facility]; on trametinib, dabrafenib), h/o of kyphoplasty 2/ bone mets, h/o recent RLE DVT on lovenox, HTN, COPD, Hypothyroidism   Presented 5/27 from home with inability to ambulate. Patient reports he started to experience intermittent pain in L thigh and went for CT and duplex today. When came back, started to experience unbearable pain in L high and observed some bruising limiting his mobility.    VS WNL  Labs notable for hgb 7.5 (11.6 in 04/2023), Na 131, K 5.4 hemolyzed (rpt WNL)  CTAP/CT thigh showed hematoma in L rectus femoris    Admitted to SDU for L thigh hematoma  Hgb downtrended to 6.5 - completed 1u pRBCs, now 7.4  Vascular on board, was considering IVC filter, but pt not a candidate since no DVT on LE duplex. Recommending DVT ppx, but no full AC for now. Recommend to start AC once Hgb stabilizes. Surgery following - no acute intervention.   5/30: Monitor for 24-48H while on DVT ppx for signs of bleeding, and ensure hgb remains stable.   6/1: Pt noted to have spiked fevers overnight - septic workup to be obtained.     SUBJECTIVE     Overnight events  None    Subjective complaints                                               ----------------------------------------------------------  OBJECTIVE  PAST MEDICAL & SURGICAL HISTORY  Thyroid cancer    HTN (hypertension)    COPD (chronic obstructive pulmonary disease)    Borderline diabetes    H/O thyroidectomy                                              -----------------------------------------------------------  ALLERGIES:  Allergy Status Unknown                                            ------------------------------------------------------------    HOME MEDICATIONS  Home Medications:  Advair Diskus: 2 puff(s) inhaled 2 times a day (07 Feb 2020 07:22)  Bactrim  mg-160 mg oral tablet: 1 tab(s) orally Monday, Wednesday, and Friday (28 May 2023 02:01)  dabrafenib 75 mg oral capsule: 2 orally 2 times a day (20 Apr 2023 03:36)  esomeprazole 40 mg oral delayed release capsule: 1 tab(s) orally once a day (28 May 2023 01:59)  levothyroxine 200 mcg (0.2 mg) oral capsule: 1 cap(s) orally once a day (28 May 2023 02:02)  mirtazapine 15 mg oral tablet: 1 tab(s) orally once a day (at bedtime) (28 May 2023 01:59)  pravastatin 40 mg oral tablet: 1 tab(s) orally once a day (23 Nov 2019 18:22)  predniSONE 10 mg oral tablet: 1 orally 2 times a day (20 Apr 2023 03:35)  Singulair 10 mg oral tablet: 1 tab(s) orally once a day (23 Nov 2019 18:22)  Spiriva 18 mcg inhalation capsule: 1 cap(s) inhaled once a day (23 Nov 2019 18:22)  Toprol-XL 25 mg oral tablet, extended release: 1 tab(s) orally once a day (23 Nov 2019 18:22)  trametinib 2 mg oral tablet: 1 tab(s) orally once a day (28 May 2023 02:01)  Ventolin HFA 90 mcg/inh inhalation aerosol: 2 puff(s) inhaled 4 times a day (23 Nov 2019 18:22)                           MEDICATIONS:  STANDING MEDICATIONS  albuterol/ipratropium for Nebulization 3 milliLiter(s) Nebulizer every 6 hours  atorvastatin 10 milliGRAM(s) Oral at bedtime  budesonide 160 MICROgram(s)/formoterol 4.5 MICROgram(s) Inhaler 2 Puff(s) Inhalation two times a day  dabrafenib 150 milliGRAM(s) Oral every 12 hours  enoxaparin Injectable 40 milliGRAM(s) SubCutaneous every 24 hours  levothyroxine 200 MICROGram(s) Oral daily  magnesium oxide 400 milliGRAM(s) Oral three times a day with meals  metoprolol tartrate 12.5 milliGRAM(s) Oral two times a day  mirtazapine 15 milliGRAM(s) Oral at bedtime  montelukast 10 milliGRAM(s) Oral daily  pantoprazole    Tablet 40 milliGRAM(s) Oral before breakfast  polyethylene glycol 3350 17 Gram(s) Oral daily  predniSONE   Tablet 10 milliGRAM(s) Oral two times a day  senna 2 Tablet(s) Oral at bedtime  tamsulosin 0.4 milliGRAM(s) Oral at bedtime  trametinib 2 milliGRAM(s) Oral daily  trimethoprim  160 mG/sulfamethoxazole 800 mG 1 Tablet(s) Oral <User Schedule>    PRN MEDICATIONS  albuterol    90 MICROgram(s) HFA Inhaler 2 Puff(s) Inhalation every 6 hours PRN  ondansetron    Tablet 4 milliGRAM(s) Oral every 8 hours PRN  oxycodone    5 mG/acetaminophen 325 mG 2 Tablet(s) Oral every 4 hours PRN                                            ------------------------------------------------------------  VITAL SIGNS: Last 24 Hours  T(C): 37.2 (01 Jun 2023 05:11), Max: 38.1 (31 May 2023 20:53)  T(F): 98.9 (01 Jun 2023 05:11), Max: 100.6 (31 May 2023 20:53)  HR: 104 (01 Jun 2023 05:11) (92 - 105)  BP: 122/85 (01 Jun 2023 05:11) (100/71 - 122/85)  BP(mean): --  RR: 18 (01 Jun 2023 05:11) (18 - 18)  SpO2: 96% (31 May 2023 09:55) (96% - 96%)                                             --------------------------------------------------------------  LABS:                        7.8    9.92  )-----------( 240      ( 31 May 2023 07:31 )             24.5     05-31    137  |  100  |  16  ----------------------------<  110<H>  5.0   |  28  |  0.7    Ca    8.5      31 May 2023 07:31  Mg     2.0     05-31    TPro  5.1<L>  /  Alb  2.9<L>  /  TBili  0.6  /  DBili  x   /  AST  15  /  ALT  13  /  AlkPhos  186<H>  05-31                                                              -------------------------------------------------------------  RADIOLOGY:                                            --------------------------------------------------------------    PHYSICAL EXAM:  GEN: NAD  NEURO: Alert & Orientedx4,   HEENT: nontraumatic, normocephalic, neck supple  CARD: S1, S2 audible, no S3, regular rate and rhythm, no murmur  PULM: B/L breath sounds, no wheezing, crackles, or rales  ABD: Soft, nondistended, nontender  EXTR: tenderness to LLE, left thigh in ACE wrap                                           --------------------------------------------------------------                 JOAQUIN HERNANDEZ 63y Male  MRN#: [id number]     Hospital Day: 5d    CC:  L thigh hematoma    HOSPITAL COURSE:   63M. PMH: thyroid cancer with mets (Follows at [facility]; on trametinib, dabrafenib), h/o of kyphoplasty 2/ bone mets, h/o recent RLE DVT on lovenox, HTN, COPD, Hypothyroidism   Presented 5/27 from home with inability to ambulate. Patient reports he started to experience intermittent pain in L thigh and went for CT and duplex today. When came back, started to experience unbearable pain in L high and observed some bruising limiting his mobility.    VS WNL  Labs notable for hgb 7.5 (11.6 in 04/2023), Na 131, K 5.4 hemolyzed (rpt WNL)  CTAP/CT thigh showed hematoma in L rectus femoris    Admitted to SDU for L thigh hematoma  Hgb downtrended to 6.5 - completed 1u pRBCs, now 7.4  Vascular on board, was considering IVC filter, but pt not a candidate since no DVT on LE duplex. Recommending DVT ppx, but no full AC for now. Recommend to start AC once Hgb stabilizes. Surgery following - no acute intervention.   5/30: Monitor for 24-48H while on DVT ppx for signs of bleeding, and ensure hgb remains stable.   6/1: Pt noted to have spiked fevers overnight - septic workup to be obtained.     SUBJECTIVE     Overnight events  None    Subjective complaints   Pt evaluated at bedside. No active complaints. Denies cough, SOB, abdominal pain, diarrhea, dysuria.                                             ----------------------------------------------------------  OBJECTIVE  PAST MEDICAL & SURGICAL HISTORY  Thyroid cancer    HTN (hypertension)    COPD (chronic obstructive pulmonary disease)    Borderline diabetes    H/O thyroidectomy                                              -----------------------------------------------------------  ALLERGIES:  Allergy Status Unknown                                            ------------------------------------------------------------    HOME MEDICATIONS  Home Medications:  Advair Diskus: 2 puff(s) inhaled 2 times a day (07 Feb 2020 07:22)  Bactrim  mg-160 mg oral tablet: 1 tab(s) orally Monday, Wednesday, and Friday (28 May 2023 02:01)  dabrafenib 75 mg oral capsule: 2 orally 2 times a day (20 Apr 2023 03:36)  esomeprazole 40 mg oral delayed release capsule: 1 tab(s) orally once a day (28 May 2023 01:59)  levothyroxine 200 mcg (0.2 mg) oral capsule: 1 cap(s) orally once a day (28 May 2023 02:02)  mirtazapine 15 mg oral tablet: 1 tab(s) orally once a day (at bedtime) (28 May 2023 01:59)  pravastatin 40 mg oral tablet: 1 tab(s) orally once a day (23 Nov 2019 18:22)  predniSONE 10 mg oral tablet: 1 orally 2 times a day (20 Apr 2023 03:35)  Singulair 10 mg oral tablet: 1 tab(s) orally once a day (23 Nov 2019 18:22)  Spiriva 18 mcg inhalation capsule: 1 cap(s) inhaled once a day (23 Nov 2019 18:22)  Toprol-XL 25 mg oral tablet, extended release: 1 tab(s) orally once a day (23 Nov 2019 18:22)  trametinib 2 mg oral tablet: 1 tab(s) orally once a day (28 May 2023 02:01)  Ventolin HFA 90 mcg/inh inhalation aerosol: 2 puff(s) inhaled 4 times a day (23 Nov 2019 18:22)                           MEDICATIONS:  STANDING MEDICATIONS  albuterol/ipratropium for Nebulization 3 milliLiter(s) Nebulizer every 6 hours  atorvastatin 10 milliGRAM(s) Oral at bedtime  budesonide 160 MICROgram(s)/formoterol 4.5 MICROgram(s) Inhaler 2 Puff(s) Inhalation two times a day  dabrafenib 150 milliGRAM(s) Oral every 12 hours  enoxaparin Injectable 40 milliGRAM(s) SubCutaneous every 24 hours  levothyroxine 200 MICROGram(s) Oral daily  magnesium oxide 400 milliGRAM(s) Oral three times a day with meals  metoprolol tartrate 12.5 milliGRAM(s) Oral two times a day  mirtazapine 15 milliGRAM(s) Oral at bedtime  montelukast 10 milliGRAM(s) Oral daily  pantoprazole    Tablet 40 milliGRAM(s) Oral before breakfast  polyethylene glycol 3350 17 Gram(s) Oral daily  predniSONE   Tablet 10 milliGRAM(s) Oral two times a day  senna 2 Tablet(s) Oral at bedtime  tamsulosin 0.4 milliGRAM(s) Oral at bedtime  trametinib 2 milliGRAM(s) Oral daily  trimethoprim  160 mG/sulfamethoxazole 800 mG 1 Tablet(s) Oral <User Schedule>    PRN MEDICATIONS  albuterol    90 MICROgram(s) HFA Inhaler 2 Puff(s) Inhalation every 6 hours PRN  ondansetron    Tablet 4 milliGRAM(s) Oral every 8 hours PRN  oxycodone    5 mG/acetaminophen 325 mG 2 Tablet(s) Oral every 4 hours PRN                                            ------------------------------------------------------------  VITAL SIGNS: Last 24 Hours  T(C): 37.2 (01 Jun 2023 05:11), Max: 38.1 (31 May 2023 20:53)  T(F): 98.9 (01 Jun 2023 05:11), Max: 100.6 (31 May 2023 20:53)  HR: 104 (01 Jun 2023 05:11) (92 - 105)  BP: 122/85 (01 Jun 2023 05:11) (100/71 - 122/85)  BP(mean): --  RR: 18 (01 Jun 2023 05:11) (18 - 18)  SpO2: 96% (31 May 2023 09:55) (96% - 96%)                                             --------------------------------------------------------------  LABS:                        7.8    9.92  )-----------( 240      ( 31 May 2023 07:31 )             24.5     05-31    137  |  100  |  16  ----------------------------<  110<H>  5.0   |  28  |  0.7    Ca    8.5      31 May 2023 07:31  Mg     2.0     05-31    TPro  5.1<L>  /  Alb  2.9<L>  /  TBili  0.6  /  DBili  x   /  AST  15  /  ALT  13  /  AlkPhos  186<H>  05-31                                                              -------------------------------------------------------------  RADIOLOGY:                                            --------------------------------------------------------------    PHYSICAL EXAM:  GEN: NAD  NEURO: Alert & Orientedx4,   HEENT: nontraumatic, normocephalic, neck supple  CARD: S1, S2 audible, no S3, regular rate and rhythm, no murmur  PULM: B/L breath sounds, no wheezing, crackles, or rales  ABD: Soft, nondistended, nontender  EXTR: tenderness to LLE, left thigh in ACE wrap                                           --------------------------------------------------------------

## 2023-06-01 NOTE — CDI QUERY NOTE - [id number]_GEN_ALL_CORE_HH
Based on your professional judgment and the clinical indicators below, please clarify if spontaneous left thigh hematoma can be further specified as:  • Spontaneous left thigh hematoma associated with anticoagulant use  • Spontaneous left thigh hematoma not associated with anticoagulant use  • Other (please specify):  • Clinically unable to determine      CLINICAL INDICATORS    6/1 Hospitalist Progress Note: …Left thigh pain 2/2 left thigh hematoma-spontaneous (no h/o recent trauma)… hx of Thyroid cancer with mets, Recent RLE DVT (on Lovenox started 3 weeks ago at [facility] [place])… on dvt ppx with lovenox (40 mg daily) while inpatient--->transition to xarelto on dc. s/p 1u prbc transfusion for anemia. monitor CBC--stable hb (7.8 today)…    Radiology Results:  • 5/27 CT Left Lower Ext Impression: Intramuscular hematoma within the rectus femoris measuring 14.9 x 5 x 1.9 cm. Layering hyperdense material within the hematoma likely reflecting acute blood products. Partially visualized intermediate density collections within the left lower abdominal wall, likely also reflecting blood products. Correlate clinically.

## 2023-06-01 NOTE — PROGRESS NOTE ADULT - ASSESSMENT
a/p:  64 yo M with hx of Thyroid cancer with mets, Recent RLE DVT (on Lovenox started 3 weeks ago at [facility] [place]), Kyphoplasty presents to ED for with a complaint of Left thigh pain x 1 week.     #Left thigh pain 2/2 left thigh hematoma-spontaneous (no h/o recent trauma)  #Acute blood loss anemia   - no signs of compartment syndrome   - CT shows Intramuscular hematoma within the rectus femoris measuring 14.9 x 5 x 1.9 cm. Layering hyperdense material within the hematoma likely reflecting acute blood products with Partially visualized intermediate density collections within the left lower abdominal wall, likely also reflecting blood products.  - s/p evaluation by surgery in ED --> no acute surgical intervention.  -vascular surgery following---not candidate for IVC filter or other repair at this time   -continue to monitor closely   -duplex neg for dvt (limited study)---will need repeat duplex in 2 weeks  -on dvt ppx with lovenox (40 mg daily) while inpatient--->transition to xarelto on dc  -s/p 1u prbc transfusion for anemia  - monitor CBC--stable hb (7.8 today)  - pain control and compression to left thigh    #Recent RLE DVT   - venous duplex LE negative (limited study)  -continue lovenox 40 mg once daily for dvt ppx while inpatient  - patient needs to repeat LE duplex in 2 weeks after dc and will continue xarelto on dc     #Fever-suspect 2/2 chemo (family brought into hospital yesterday---trametinib and dabrafenib)  -no other symptoms  -UA negative  -normal wbc  -if remains afebrile plan for dc home tonight vs anticipate dc in am  -tylenol ppx with chemo agents    #Hyperkalemia, mild   - low k diet   - resolved     #COPD   - not in exacerbation.   - c/w inhalers     #Thyroid cancer with mets   - follows  at [facility] [place]   - patient is on chronic prednisone 10mg BID and Bactrim for ppx     #Hypothyroidism - on synthroid.     #HTN/DM2-- c/w home med     DVT/GI ppx  guarded prognisis    FULL CODE    anticipate likely dc home in later today (if remains afebrile) vs in am if continues to spike temp pending infectious w/u-    Dispo: Monitor hgb, temperature  Plan d/w patient and wife bedside  Disposition: anticipate dc home later today or tomorrow if remains afebrile    Total time spent to complete patient's bedside assessment, review medical chart, discuss medical plan of care with covering medical team was more than 35 minutes  with >50% of time spendt face to face with patient, discussion with patient/family and/or coordination of care a/p:  64 yo M with hx of Thyroid cancer with mets, Recent RLE DVT (on Lovenox started 3 weeks ago at [facility] [place]), Kyphoplasty presents to ED for with a complaint of Left thigh pain x 1 week.     #Left thigh pain 2/2 left thigh hematoma-spontaneous (no h/o recent trauma)  #Acute blood loss anemia   - no signs of compartment syndrome   -spontaneous left thigh hematoma associated with Anticoagulant use   - CT shows Intramuscular hematoma within the rectus femoris measuring 14.9 x 5 x 1.9 cm. Layering hyperdense material within the hematoma likely reflecting acute blood products with Partially visualized intermediate density collections within the left lower abdominal wall, likely also reflecting blood products.  - s/p evaluation by surgery in ED --> no acute surgical intervention.  -vascular surgery following---not candidate for IVC filter or other repair at this time   -continue to monitor closely   -duplex neg for dvt (limited study)---will need repeat duplex in 2 weeks  -on dvt ppx with lovenox (40 mg daily) while inpatient--->transition to xarelto on dc  -s/p 1u prbc transfusion for anemia  - monitor CBC--stable hb (7.8 today)  - pain control and compression to left thigh    #Recent RLE DVT   - venous duplex LE negative (limited study)  -continue lovenox 40 mg once daily for dvt ppx while inpatient  - patient needs to repeat LE duplex in 2 weeks after dc and will continue xarelto on dc     #Fever-suspect 2/2 chemo (family brought into hospital yesterday---trametinib and dabrafenib)  -no other symptoms  -UA negative  -normal wbc  -if remains afebrile plan for dc home tonight vs anticipate dc in am  -tylenol ppx with chemo agents    #Hyperkalemia, mild   - low k diet   - resolved     #COPD   - not in exacerbation.   - c/w inhalers     #Thyroid cancer with mets   - follows  at [facility] [place]   - patient is on chronic prednisone 10mg BID and Bactrim for ppx     #Hypothyroidism - on synthroid.     #HTN/DM2-- c/w home med     DVT/GI ppx  guarded prognisis    FULL CODE    anticipate likely dc home in later today (if remains afebrile) vs in am if continues to spike temp pending infectious w/u-    Dispo: Monitor hgb, temperature  Plan d/w patient and wife bedside  Disposition: anticipate dc home later today or tomorrow if remains afebrile    Total time spent to complete patient's bedside assessment, review medical chart, discuss medical plan of care with covering medical team was more than 35 minutes  with >50% of time spendt face to face with patient, discussion with patient/family and/or coordination of care

## 2023-06-02 LAB
ALBUMIN SERPL ELPH-MCNC: 2.8 G/DL — LOW (ref 3.5–5.2)
ALBUMIN SERPL ELPH-MCNC: 2.9 G/DL — LOW (ref 3.5–5.2)
ALBUMIN SERPL ELPH-MCNC: 3 G/DL — LOW (ref 3.5–5.2)
ALP SERPL-CCNC: 893 U/L — HIGH (ref 30–115)
ALP SERPL-CCNC: 920 U/L — HIGH (ref 30–115)
ALP SERPL-CCNC: 945 U/L — HIGH (ref 30–115)
ALT FLD-CCNC: 148 U/L — HIGH (ref 0–41)
ALT FLD-CCNC: 170 U/L — HIGH (ref 0–41)
ALT FLD-CCNC: 178 U/L — HIGH (ref 0–41)
ANION GAP SERPL CALC-SCNC: 15 MMOL/L — HIGH (ref 7–14)
ANION GAP SERPL CALC-SCNC: 9 MMOL/L — SIGNIFICANT CHANGE UP (ref 7–14)
ANION GAP SERPL CALC-SCNC: 9 MMOL/L — SIGNIFICANT CHANGE UP (ref 7–14)
AST SERPL-CCNC: 107 U/L — HIGH (ref 0–41)
AST SERPL-CCNC: 171 U/L — HIGH (ref 0–41)
AST SERPL-CCNC: 173 U/L — HIGH (ref 0–41)
BASOPHILS # BLD AUTO: 0.05 K/UL — SIGNIFICANT CHANGE UP (ref 0–0.2)
BASOPHILS NFR BLD AUTO: 0.5 % — SIGNIFICANT CHANGE UP (ref 0–1)
BILIRUB DIRECT SERPL-MCNC: 0.8 MG/DL — HIGH (ref 0–0.3)
BILIRUB INDIRECT FLD-MCNC: 0.6 MG/DL — SIGNIFICANT CHANGE UP (ref 0.2–1.2)
BILIRUB SERPL-MCNC: 0.9 MG/DL — SIGNIFICANT CHANGE UP (ref 0.2–1.2)
BILIRUB SERPL-MCNC: 1.4 MG/DL — HIGH (ref 0.2–1.2)
BILIRUB SERPL-MCNC: 1.5 MG/DL — HIGH (ref 0.2–1.2)
BLD GP AB SCN SERPL QL: SIGNIFICANT CHANGE UP
BUN SERPL-MCNC: 16 MG/DL — SIGNIFICANT CHANGE UP (ref 10–20)
BUN SERPL-MCNC: 18 MG/DL — SIGNIFICANT CHANGE UP (ref 10–20)
BUN SERPL-MCNC: 19 MG/DL — SIGNIFICANT CHANGE UP (ref 10–20)
CALCIUM SERPL-MCNC: 8.4 MG/DL — SIGNIFICANT CHANGE UP (ref 8.4–10.5)
CALCIUM SERPL-MCNC: 8.6 MG/DL — SIGNIFICANT CHANGE UP (ref 8.4–10.5)
CALCIUM SERPL-MCNC: 8.6 MG/DL — SIGNIFICANT CHANGE UP (ref 8.4–10.5)
CHLORIDE SERPL-SCNC: 100 MMOL/L — SIGNIFICANT CHANGE UP (ref 98–110)
CHLORIDE SERPL-SCNC: 100 MMOL/L — SIGNIFICANT CHANGE UP (ref 98–110)
CHLORIDE SERPL-SCNC: 102 MMOL/L — SIGNIFICANT CHANGE UP (ref 98–110)
CO2 SERPL-SCNC: 19 MMOL/L — SIGNIFICANT CHANGE UP (ref 17–32)
CO2 SERPL-SCNC: 26 MMOL/L — SIGNIFICANT CHANGE UP (ref 17–32)
CO2 SERPL-SCNC: 30 MMOL/L — SIGNIFICANT CHANGE UP (ref 17–32)
CREAT SERPL-MCNC: 0.7 MG/DL — SIGNIFICANT CHANGE UP (ref 0.7–1.5)
CREAT SERPL-MCNC: 0.8 MG/DL — SIGNIFICANT CHANGE UP (ref 0.7–1.5)
CREAT SERPL-MCNC: 0.9 MG/DL — SIGNIFICANT CHANGE UP (ref 0.7–1.5)
EGFR: 100 ML/MIN/1.73M2 — SIGNIFICANT CHANGE UP
EGFR: 104 ML/MIN/1.73M2 — SIGNIFICANT CHANGE UP
EGFR: 97 ML/MIN/1.73M2 — SIGNIFICANT CHANGE UP
EOSINOPHIL # BLD AUTO: 0.25 K/UL — SIGNIFICANT CHANGE UP (ref 0–0.7)
EOSINOPHIL NFR BLD AUTO: 2.3 % — SIGNIFICANT CHANGE UP (ref 0–8)
GLUCOSE BLDC GLUCOMTR-MCNC: 121 MG/DL — HIGH (ref 70–99)
GLUCOSE BLDC GLUCOMTR-MCNC: 135 MG/DL — HIGH (ref 70–99)
GLUCOSE SERPL-MCNC: 138 MG/DL — HIGH (ref 70–99)
GLUCOSE SERPL-MCNC: 93 MG/DL — SIGNIFICANT CHANGE UP (ref 70–99)
GLUCOSE SERPL-MCNC: 95 MG/DL — SIGNIFICANT CHANGE UP (ref 70–99)
HCT VFR BLD CALC: 26.1 % — LOW (ref 42–52)
HGB BLD-MCNC: 8.1 G/DL — LOW (ref 14–18)
IMM GRANULOCYTES NFR BLD AUTO: 1.2 % — HIGH (ref 0.1–0.3)
LYMPHOCYTES # BLD AUTO: 1.27 K/UL — SIGNIFICANT CHANGE UP (ref 1.2–3.4)
LYMPHOCYTES # BLD AUTO: 11.7 % — LOW (ref 20.5–51.1)
MAGNESIUM SERPL-MCNC: 2.1 MG/DL — SIGNIFICANT CHANGE UP (ref 1.8–2.4)
MCHC RBC-ENTMCNC: 27.3 PG — SIGNIFICANT CHANGE UP (ref 27–31)
MCHC RBC-ENTMCNC: 31 G/DL — LOW (ref 32–37)
MCV RBC AUTO: 87.9 FL — SIGNIFICANT CHANGE UP (ref 80–94)
MONOCYTES # BLD AUTO: 1.18 K/UL — HIGH (ref 0.1–0.6)
MONOCYTES NFR BLD AUTO: 10.9 % — HIGH (ref 1.7–9.3)
NEUTROPHILS # BLD AUTO: 7.94 K/UL — HIGH (ref 1.4–6.5)
NEUTROPHILS NFR BLD AUTO: 73.4 % — SIGNIFICANT CHANGE UP (ref 42.2–75.2)
NRBC # BLD: 0 /100 WBCS — SIGNIFICANT CHANGE UP (ref 0–0)
PLATELET # BLD AUTO: 286 K/UL — SIGNIFICANT CHANGE UP (ref 130–400)
PMV BLD: 10.1 FL — SIGNIFICANT CHANGE UP (ref 7.4–10.4)
POTASSIUM SERPL-MCNC: 4.6 MMOL/L — SIGNIFICANT CHANGE UP (ref 3.5–5)
POTASSIUM SERPL-MCNC: 5.1 MMOL/L — HIGH (ref 3.5–5)
POTASSIUM SERPL-MCNC: 6 MMOL/L — CRITICAL HIGH (ref 3.5–5)
POTASSIUM SERPL-SCNC: 4.6 MMOL/L — SIGNIFICANT CHANGE UP (ref 3.5–5)
POTASSIUM SERPL-SCNC: 5.1 MMOL/L — HIGH (ref 3.5–5)
POTASSIUM SERPL-SCNC: 6 MMOL/L — CRITICAL HIGH (ref 3.5–5)
PROT SERPL-MCNC: 5.2 G/DL — LOW (ref 6–8)
PROT SERPL-MCNC: 5.3 G/DL — LOW (ref 6–8)
PROT SERPL-MCNC: 5.4 G/DL — LOW (ref 6–8)
RBC # BLD: 2.97 M/UL — LOW (ref 4.7–6.1)
RBC # FLD: 19.4 % — HIGH (ref 11.5–14.5)
SODIUM SERPL-SCNC: 135 MMOL/L — SIGNIFICANT CHANGE UP (ref 135–146)
SODIUM SERPL-SCNC: 136 MMOL/L — SIGNIFICANT CHANGE UP (ref 135–146)
SODIUM SERPL-SCNC: 139 MMOL/L — SIGNIFICANT CHANGE UP (ref 135–146)
WBC # BLD: 10.82 K/UL — HIGH (ref 4.8–10.8)
WBC # FLD AUTO: 10.82 K/UL — HIGH (ref 4.8–10.8)

## 2023-06-02 PROCEDURE — 76705 ECHO EXAM OF ABDOMEN: CPT | Mod: 26

## 2023-06-02 PROCEDURE — 99223 1ST HOSP IP/OBS HIGH 75: CPT

## 2023-06-02 PROCEDURE — 99233 SBSQ HOSP IP/OBS HIGH 50: CPT

## 2023-06-02 RX ORDER — DEXTROSE 50 % IN WATER 50 %
50 SYRINGE (ML) INTRAVENOUS ONCE
Refills: 0 | Status: COMPLETED | OUTPATIENT
Start: 2023-06-02 | End: 2023-06-02

## 2023-06-02 RX ORDER — INSULIN HUMAN 100 [IU]/ML
10 INJECTION, SOLUTION SUBCUTANEOUS ONCE
Refills: 0 | Status: COMPLETED | OUTPATIENT
Start: 2023-06-02 | End: 2023-06-02

## 2023-06-02 RX ORDER — SODIUM ZIRCONIUM CYCLOSILICATE 10 G/10G
10 POWDER, FOR SUSPENSION ORAL ONCE
Refills: 0 | Status: COMPLETED | OUTPATIENT
Start: 2023-06-02 | End: 2023-06-02

## 2023-06-02 RX ADMIN — Medication 12.5 MILLIGRAM(S): at 17:48

## 2023-06-02 RX ADMIN — Medication 1 TABLET(S): at 12:06

## 2023-06-02 RX ADMIN — SENNA PLUS 2 TABLET(S): 8.6 TABLET ORAL at 21:20

## 2023-06-02 RX ADMIN — PANTOPRAZOLE SODIUM 40 MILLIGRAM(S): 20 TABLET, DELAYED RELEASE ORAL at 05:54

## 2023-06-02 RX ADMIN — Medication 3 MILLILITER(S): at 20:13

## 2023-06-02 RX ADMIN — BUDESONIDE AND FORMOTEROL FUMARATE DIHYDRATE 2 PUFF(S): 160; 4.5 AEROSOL RESPIRATORY (INHALATION) at 20:43

## 2023-06-02 RX ADMIN — TAMSULOSIN HYDROCHLORIDE 0.4 MILLIGRAM(S): 0.4 CAPSULE ORAL at 21:20

## 2023-06-02 RX ADMIN — MAGNESIUM OXIDE 400 MG ORAL TABLET 400 MILLIGRAM(S): 241.3 TABLET ORAL at 08:58

## 2023-06-02 RX ADMIN — Medication 50 MILLILITER(S): at 21:51

## 2023-06-02 RX ADMIN — Medication 3 MILLILITER(S): at 15:24

## 2023-06-02 RX ADMIN — DABRAFENIB 150 MILLIGRAM(S): 75 CAPSULE ORAL at 06:13

## 2023-06-02 RX ADMIN — MAGNESIUM OXIDE 400 MG ORAL TABLET 400 MILLIGRAM(S): 241.3 TABLET ORAL at 12:06

## 2023-06-02 RX ADMIN — MIRTAZAPINE 15 MILLIGRAM(S): 45 TABLET, ORALLY DISINTEGRATING ORAL at 21:20

## 2023-06-02 RX ADMIN — SODIUM ZIRCONIUM CYCLOSILICATE 10 GRAM(S): 10 POWDER, FOR SUSPENSION ORAL at 21:51

## 2023-06-02 RX ADMIN — Medication 3 MILLILITER(S): at 09:10

## 2023-06-02 RX ADMIN — MONTELUKAST 10 MILLIGRAM(S): 4 TABLET, CHEWABLE ORAL at 12:06

## 2023-06-02 RX ADMIN — DABRAFENIB 150 MILLIGRAM(S): 75 CAPSULE ORAL at 18:21

## 2023-06-02 RX ADMIN — ENOXAPARIN SODIUM 40 MILLIGRAM(S): 100 INJECTION SUBCUTANEOUS at 17:47

## 2023-06-02 RX ADMIN — Medication 10 MILLIGRAM(S): at 05:54

## 2023-06-02 RX ADMIN — Medication 10 MILLIGRAM(S): at 17:47

## 2023-06-02 RX ADMIN — INSULIN HUMAN 10 UNIT(S): 100 INJECTION, SOLUTION SUBCUTANEOUS at 21:59

## 2023-06-02 RX ADMIN — Medication 200 MICROGRAM(S): at 05:54

## 2023-06-02 RX ADMIN — MAGNESIUM OXIDE 400 MG ORAL TABLET 400 MILLIGRAM(S): 241.3 TABLET ORAL at 17:48

## 2023-06-02 RX ADMIN — Medication 12.5 MILLIGRAM(S): at 05:54

## 2023-06-02 NOTE — PROGRESS NOTE ADULT - ASSESSMENT
a/p:  62 yo M with hx of Thyroid cancer with mets, Recent RLE DVT (on Lovenox started 3 weeks ago at [facility] [place]), Kyphoplasty presents to ED for with a complaint of Left thigh pain x 1 week.     #Left thigh pain 2/2 left thigh hematoma-spontaneous (no h/o recent trauma)  #Acute blood loss anemia   - no signs of compartment syndrome   -spontaneous left thigh hematoma associated with Anticoagulant use   - CT shows Intramuscular hematoma within the rectus femoris measuring 14.9 x 5 x 1.9 cm. Layering hyperdense material within the hematoma likely reflecting acute blood products with Partially visualized intermediate density collections within the left lower abdominal wall, likely also reflecting blood products.  - s/p evaluation by surgery in ED --> no acute surgical intervention.  -vascular surgery following---not candidate for IVC filter or other repair at this time   -continue to monitor closely   -duplex neg for dvt (limited study)---will need repeat duplex in 2 weeks  -on dvt ppx with lovenox (40 mg daily) while inpatient--->transition to xarelto on dc---due to abdominal ecchmosis will transition to xarelto today  -s/p 1u prbc transfusion for anemia  - monitor CBC--stable hb (8.1 today)  - pain control and compression to left thigh    #Recent RLE DVT   - venous duplex LE negative (limited study)  -continue lovenox 40 mg once daily for dvt ppx while inpatient---transition to xarelto on dc  -  repeat LE duplex in 2 weeks after dc and will continue     #Fever-likely 2/2 chemo (family brought into hospital ---trametinib and dabrafenib) and patient took without tylenol  #Acute transaminitis with elevated bilirrubin---r/o Gallstone   -check abdominal US  -trend lft  -patient denies etoh use but does have a h/o prior gallstones in the past==no h/o cholecystectomy  -RVP and covid negative  -UA negative  -normal wbc  -if remains afebrile plan for likely dc home over weekend once acute transaminitis resolves   -tylenol ppx and prednisone 10 mg bid with chemo agents    #Hyperkalemia, mild   - low k diet   - resolved     #COPD   - not in exacerbation.   - c/w inhalers     #Thyroid cancer with mets   - follows  at [facility] [place]   - patient is on chronic prednisone 10mg BID and Bactrim for ppx     #Hypothyroidism - on synthroid.     #HTN/DM2-- c/w home med     DVT/GI ppx  guarded prognisis    FULL CODE    anticipate likely dc home over weekend---need to trend LFT and f/u RUQ US  Dispo: Monitor hgb, temperature  Plan d/w patient and wife bedside  Disposition: anticipate dc home over weekend     Total time spent to complete patient's bedside assessment, review medical chart, discuss medical plan of care with covering medical team was more than 50 minutes  with >50% of time spendt face to face with patient, discussion with patient/family and/or coordination of care

## 2023-06-02 NOTE — PROGRESS NOTE ADULT - SUBJECTIVE AND OBJECTIVE BOX
Patient is a 63y old  Male who presents with a chief complaint of inability to ambulate (30 May 2023 06:09)    HPI:  63y Male with PMH of thyroid cancer with mets, h/o of kyphoplasty, h/o recent RLE DVT on lovenox presents from home with inability to ambulate. Patient reports he started to experience intermittent pain in L thigh and went for CT and duplex today. When came back, started to experience unbearable pain in L high and observed some bruising limiting his mobility. Therefore presents to the ED  Denies any fever, chills, headache, neurological deficits, nausea, vomiting, chest pain, palpitations, shortness of breathe, cough, abdominal pain, hematochezia, melena, hematemesis, diarrhea or constipation   (27 May 2023 22:46)    PAST MEDICAL & SURGICAL HISTORY:  Thyroid cancer  HTN (hypertension)  COPD (chronic obstructive pulmonary disease)  Borderline diabetes  H/O thyroidectomy    patient seen and examined independently on morning rounds, chart reviewed and discussed with the medicine resident and on interdisciplinary rounds.      hospital day #6    afebrile overnight- says he is feeling better and eager for dc home- explained acute bump in lft and bilirubin  rvp and covid negative    PE:  GEN-NAD, AAOx3- wife present  PULM- fair air entry, ctab  CVS- +s1/s2 irreg irreg  GI- soft NT ND +bs, + ecchymosis diffuse (from lovenox injections)- no rebound, no guarding  EXT- left medial thigh fullness but less ttp- some varying degree of healing ecchymosis over anterior leg        Labs:                        8.1    10.82 )-----------( 286      ( 02 Jun 2023 07:39 )             26.1     CBC Full  -  ( 02 Jun 2023 07:39 )  WBC Count : 10.82 K/uL  RBC Count : 2.97 M/uL  Hemoglobin : 8.1 g/dL  Hematocrit : 26.1 %  Platelet Count - Automated : 286 K/uL  Mean Cell Volume : 87.9 fL  Mean Cell Hemoglobin : 27.3 pg  Mean Cell Hemoglobin Concentration : 31.0 g/dL  Auto Neutrophil # : 7.94 K/uL  Auto Lymphocyte # : 1.27 K/uL  Auto Monocyte # : 1.18 K/uL  Auto Eosinophil # : 0.25 K/uL  Auto Basophil # : 0.05 K/uL  Auto Neutrophil % : 73.4 %  Auto Lymphocyte % : 11.7 %  Auto Monocyte % : 10.9 %  Auto Eosinophil % : 2.3 %  Auto Basophil % : 0.5 %      06-02    139  |  100  |  16  ----------------------------<  93  5.1<H>   |  30  |  0.7    Ca    8.6      02 Jun 2023 07:39  Mg     2.1     06-02    TPro  5.2<L>  /  Alb  2.8<L>  /  TBili  1.4<H>  /  DBili  0.8<H>  /  AST  173<H>  /  ALT  178<H>  /  AlkPhos  945<H>  06-02      Microbiology:      Vital Signs Last 24 Hrs  T(C): 36.7 (02 Jun 2023 12:19), Max: 37.4 (01 Jun 2023 16:50)  T(F): 98.1 (02 Jun 2023 12:19), Max: 99.4 (01 Jun 2023 16:50)  HR: 93 (02 Jun 2023 12:19) (93 - 101)  BP: 105/70 (02 Jun 2023 12:19) (98/71 - 136/84)  BP(mean): --  RR: 18 (02 Jun 2023 12:19) (18 - 18)  SpO2: 96% (02 Jun 2023 08:06) (96% - 96%)        I&O's Summary    02 Jun 2023 07:01  -  02 Jun 2023 13:22  --------------------------------------------------------  IN: 0 mL / OUT: 100 mL / NET: -100 mL               MEDICATIONS  (STANDING):  albuterol/ipratropium for Nebulization 3 milliLiter(s) Nebulizer every 6 hours  atorvastatin 10 milliGRAM(s) Oral at bedtime  budesonide 160 MICROgram(s)/formoterol 4.5 MICROgram(s) Inhaler 2 Puff(s) Inhalation two times a day  dabrafenib 150 milliGRAM(s) Oral every 12 hours  enoxaparin Injectable 40 milliGRAM(s) SubCutaneous every 24 hours  levothyroxine 200 MICROGram(s) Oral daily  magnesium oxide 400 milliGRAM(s) Oral three times a day with meals  metoprolol tartrate 12.5 milliGRAM(s) Oral two times a day  mirtazapine 15 milliGRAM(s) Oral at bedtime  montelukast 10 milliGRAM(s) Oral daily  pantoprazole    Tablet 40 milliGRAM(s) Oral before breakfast  polyethylene glycol 3350 17 Gram(s) Oral daily  predniSONE   Tablet 10 milliGRAM(s) Oral two times a day  senna 2 Tablet(s) Oral at bedtime  tamsulosin 0.4 milliGRAM(s) Oral at bedtime  trametinib 2 milliGRAM(s) Oral daily  trimethoprim  160 mG/sulfamethoxazole 800 mG 1 Tablet(s) Oral <User Schedule>

## 2023-06-02 NOTE — CONSULT NOTE ADULT - ATTENDING COMMENTS
63 y o  M with metastatic thyroid ca admitted with difficulty walking due to R thigh hematoma seen for acute cholestatic liver injury.    No icterus  Abdomen soft non tender  R thigh ecchymotic soft non tender  CT abdomen showed only cholelithiasis   Acute cholestatic liver injury - DILI likely   Liver tests normal in [facility].   On dabrafenib since Dec 2022 and trametinib on Since May 1st for thyroid cancer and on Bactrim since february.  Trametinib and Bactrim seem likely agents. Trametnib has reported mild liver injury including cholestatic liver injury.  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   Can start ursodiol for cholestatic DILI  OP follow up. May need liver biopsy. 63 y o  M with metastatic thyroid ca admitted with difficulty walking due to R thigh hematoma seen for acute cholestatic liver injury.    No icterus  Abdomen soft non tender  R thigh ecchymotic soft non tender  CT abdomen showed only cholelithiasis   Acute cholestatic liver injury - DILI likely   Liver tests normal in [facility]   On dabrafenib since Dec 2022 and trametinib on Since May 1st for thyroid cancer and on Bactrim since february.  Trametinib and Bactrim seem likely agents. Trametnib has reported mild liver injury including cholestatic liver injury.  If medication are necessary can continue with monitoring since bilirubin is not much elevated.  Bilirubin elevation and AST elevation could be due to hematoma.   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   Can start ursodiol for cholestatic DILI  OP follow up. May need liver biopsy.

## 2023-06-02 NOTE — PROGRESS NOTE ADULT - ASSESSMENT
63y Male with PMH of thyroid cancer with mets, paralyzed phernic nerve and diaphragm, COPD on , Hypertension, Diabetes, h/o of kyphoplast, h/o recent RLE DVT on lovenox presents from home with inability to ambulate    #Fever 5/31 likely medication induced   - Noted 100.6 5/31, no leukocytosis or leukopenia - Pt reports he typically takes his chemo medications with tylenol and prednisone as he has had fevers in the past; however 5/31 he took his medication late and did not take it with Tylenol and could be explanation for fever.   - UA (6/1) (-), CXR Unchanged, RVP (-)  - F/u BCx    #L rectus femoris hematoma  #Acute Anemia  #Recent DVT (~3-4wks prior to admission)  - Hgb downtrended to 6.5 (5/28) - completed 1u pRBCs, now 7.4, given 2nd 1u pRBCs (5/30) for hgb 7.1 -> now 8.1  - CT LLE (5/27): Intramuscular hematoma within the rectus femoris measuring 14.9 x 5 x 1.9cm  - Duplex BLLE Venous (5/27): (-) for DVT  *****  - surgery recs no surgical intervention, compression to thigh  - Vacular Sx on board - not a candidate since no DVT on LE duplex; Patient may remain off of anticoagulation, recommend DVT ppx while inpatient; : Would recommend resuming AC when the Hg and hematoma is stable  - PT/OT  - Active T + S  - Holding home AC  - C/w Lovenox 40  - Will Transition to xeralto for AC  *****   - Repeat venous duplex in 2 weeks to eval DVT with outpatient follow up with vascular surgery ([phone], Dr. Poon)    #Thyroid CA with mets  *F/w MSK  - lytic lesions in b/l kidneys on CTAP  - C/w home non-formulary trametinib, dabrafenib (pt has his own supply, is taking)  - C/w home Prednisone 10mg BID, Bactrim ppx   - C/w percocet PRN (allergic to dilaudid and morphine. Causes decreased respiratory drive)    #Paralyzed phrenic nerve  #COPD  - c/w albuterol PRN, Symbicort, Duoneb  - not in exacerbation    #HTN - c/w metoprolol xl  #HLD - start lipitor  #Hypothyroidism - on synthroid.     #Misc  - DVT Prophylaxis: Lovenox  - Diet: DASH  - GI Prophylaxis: PPI   63y Male with PMH of thyroid cancer with mets, paralyzed phernic nerve and diaphragm, COPD on , Hypertension, Diabetes, h/o of kyphoplast, h/o recent RLE DVT on lovenox presents from home with inability to ambulate    #Transaminitis with elevated bili  - (6/2) Bili 2.7, , ,  (Bili and AST/ALT WNL 5/31). Pt denies abdominal pain, chavis sign (-)  - GI Consulted   - F/u Hepatic Function Panel  - F/u RUQ US   - DC'd atorvastatin, avoid Tylenol     #Fever 5/31 likely medication induced   - Noted 100.6 5/31, no leukocytosis or leukopenia - Pt reports he typically takes his chemo medications with tylenol and prednisone as he has had fevers in the past; however 5/31 he took his medication late and did not take it with Tylenol and could be explanation for fever.   - UA (6/1) (-), CXR Unchanged, RVP (-)  - F/u BCx    #L rectus femoris hematoma  #Acute Anemia  #Recent DVT (~3-4wks prior to admission)  - Hgb downtrended to 6.5 (5/28) - completed 1u pRBCs, now 7.4, given 2nd 1u pRBCs (5/30) for hgb 7.1 -> now 8.1  - CT LLE (5/27): Intramuscular hematoma within the rectus femoris measuring 14.9 x 5 x 1.9cm  - Duplex BLLE Venous (5/27): (-) for DVT  *****  - surgery recs no surgical intervention, compression to thigh  - Vacular Sx on board - not a candidate since no DVT on LE duplex; Patient may remain off of anticoagulation, recommend DVT ppx while inpatient; : Would recommend resuming AC when the Hg and hematoma is stable  - PT/OT  - Active T + S  - Holding home AC  - C/w Lovenox 40  - Will Transition to xeralto for AC  *****   - Repeat venous duplex in 2 weeks to eval DVT with outpatient follow up with vascular surgery ([phone], Dr. Poon)    #Thyroid CA with mets  *F/w MSK  - lytic lesions in b/l kidneys on CTAP  - C/w home non-formulary trametinib, dabrafenib (pt has his own supply, is taking)  - C/w home Prednisone 10mg BID, Bactrim ppx   - C/w percocet PRN (allergic to dilaudid and morphine. Causes decreased respiratory drive)    #Paralyzed phrenic nerve  #COPD  - c/w albuterol PRN, Symbicort, Duoneb  - not in exacerbation    #HTN - c/w metoprolol xl  #HLD - start lipitor  #Hypothyroidism - on synthroid.     #Misc  - DVT Prophylaxis: Lovenox  - Diet: DASH  - GI Prophylaxis: PPI  - Family discussion: updated patient and wife and bedside on plan   63y Male with PMH of thyroid cancer with mets, paralyzed phernic nerve and diaphragm, COPD on , Hypertension, Diabetes, h/o of kyphoplast, h/o recent RLE DVT on lovenox presents from home with inability to ambulate    #Transaminitis with elevated bili  - 6/1: Pt denies abdominal pain, chavis sign (-)  - (Bili and AST/ALT WNL 5/31 -> (6/1) Bili 2.7, , ,  ).  -> (6/2) Bili 1.4 (Direct 0.8/Indirect 0.6); ; ;   - GI Consulted   - F/u RUQ US   - DC'd atorvastatin, avoid Tylenol     #Fever 5/31 likely medication induced vs related to transaminitis   - Noted 100.6 5/31, no leukocytosis or leukopenia - Pt reports he typically takes his chemo medications with tylenol and prednisone as he has had fevers in the past; however 5/31 he took his medication late and did not take it with Tylenol and could be explanation for fever.   - UA (6/1) (-), CXR Unchanged, RVP (-)  - F/u BCx    #L rectus femoris hematoma  #Acute Anemia  #Recent DVT (~3-4wks prior to admission)  - Hgb downtrended to 6.5 (5/28) - completed 1u pRBCs, now 7.4, given 2nd 1u pRBCs (5/30) for hgb 7.1 -> now 8.1  - CT LLE (5/27): Intramuscular hematoma within the rectus femoris measuring 14.9 x 5 x 1.9cm  - Duplex BLLE Venous (5/27): (-) for DVT  *****  - surgery recs no surgical intervention, compression to thigh  - Vacular Sx on board - not a candidate since no DVT on LE duplex; Patient may remain off of anticoagulation, recommend DVT ppx while inpatient; Would recommend resuming AC when the Hg and hematoma is stable  - PT/OT  - Active T + S  - Holding home AC  - C/w Lovenox 40  - Will Transition to xeralto for AC on discharge  *****   - Repeat venous duplex in 2 weeks to eval DVT with outpatient follow up with vascular surgery ([phone], Dr. Poon)    #Thyroid CA with mets  *F/w MSK  - lytic lesions in b/l kidneys on CTAP  - C/w home non-formulary trametinib, dabrafenib (pt has his own supply, is taking)  - C/w home Prednisone 10mg BID, Bactrim ppx   - C/w percocet PRN (allergic to dilaudid and morphine. Causes decreased respiratory drive)    #Paralyzed phrenic nerve  #COPD  - c/w albuterol PRN, Symbicort, Duoneb  - not in exacerbation    #HTN - c/w metoprolol xl  #HLD - start lipitor  #Hypothyroidism - on synthroid.     #Misc  - DVT Prophylaxis: Lovenox  - Diet: DASH  - GI Prophylaxis: PPI  - Family discussion: updated patient and wife and bedside on plan

## 2023-06-02 NOTE — PROGRESS NOTE ADULT - SUBJECTIVE AND OBJECTIVE BOX
JOAQUIN HERNANDEZ 63y Male  MRN#: [id number]     Hospital Day: 6d    CC:  L thigh hematoma    HOSPITAL COURSE:   63M. PMH: thyroid cancer with mets (Follows at [facility]; on trametinib, dabrafenib), h/o of kyphoplasty 2/ bone mets, h/o recent RLE DVT on lovenox, HTN, COPD, Hypothyroidism   Presented  from home with inability to ambulate. Patient reports he started to experience intermittent pain in L thigh and went for CT and duplex today. When came back, started to experience unbearable pain in L high and observed some bruising limiting his mobility.    VS WNL  Labs notable for hgb 7.5 (11.6 in 2023), Na 131, K 5.4 hemolyzed (rpt WNL)  CTAP/CT thigh showed hematoma in L rectus femoris    Admitted to SDU for L thigh hematoma  Hgb downtrended to 6.5 - completed 1u pRBCs, now 7.4  Vascular on board, was considering IVC filter, but pt not a candidate since no DVT on LE duplex. Recommending DVT ppx, but no full AC for now. Recommend to start AC once Hgb stabilizes. Surgery following - no acute intervention.   : Monitor for 24-48H while on DVT ppx for signs of bleeding, and ensure hgb remains stable.   : Pt noted to have spiked fevers overnight - septic workup to be obtained. Septic workup (-), fever likely medication induced.     SUBJECTIVE     Overnight events  None    Subjective complaints                                               ----------------------------------------------------------  OBJECTIVE  PAST MEDICAL & SURGICAL HISTORY  Thyroid cancer    HTN (hypertension)    COPD (chronic obstructive pulmonary disease)    Borderline diabetes    H/O thyroidectomy                                              -----------------------------------------------------------  ALLERGIES:  Allergy Status Unknown                                            ------------------------------------------------------------    HOME MEDICATIONS  Home Medications:  Advair Diskus: 2 puff(s) inhaled 2 times a day (2023 11:15)  Bactrim  mg-160 mg oral tablet: 1 tab(s) orally Monday, Wednesday, and Friday (28 May 2023 02:01)  dabrafenib 75 mg oral capsule: 2 orally 2 times a day (2023 11:15)  esomeprazole 40 mg oral delayed release capsule: 1 tab(s) orally once a day (28 May 2023 01:59)  Flomax 0.4 mg oral capsule: 1 orally once a day (2023 11:15)  levothyroxine 200 mcg (0.2 mg) oral capsule: 1 cap(s) orally once a day (28 May 2023 02:02)  mirtazapine 15 mg oral tablet: 1 tab(s) orally once a day (at bedtime) (28 May 2023 01:59)  Percocet 10 mg-325 mg oral tablet: 1 orally every 4 hours as needed for  severe pain (2023 11:16)  pravastatin 40 mg oral tablet: 1 tab(s) orally once a day (2023 11:15)  predniSONE 10 mg oral tablet: 1 orally 2 times a day (2023 11:15)  Singulair 10 mg oral tablet: 1 tab(s) orally once a day (2023 11:15)  Spiriva 18 mcg inhalation capsule: 1 cap(s) inhaled once a day (2023 11:15)  Toprol-XL 25 mg oral tablet, extended release: 1 tab(s) orally once a day (2023 11:15)  trametinib 2 mg oral tablet: 1 tab(s) orally once a day (28 May 2023 02:01)  Ventolin HFA 90 mcg/inh inhalation aerosol: 2 puff(s) inhaled 4 times a day (2023 11:15)                           MEDICATIONS:  STANDING MEDICATIONS  albuterol/ipratropium for Nebulization 3 milliLiter(s) Nebulizer every 6 hours  atorvastatin 10 milliGRAM(s) Oral at bedtime  budesonide 160 MICROgram(s)/formoterol 4.5 MICROgram(s) Inhaler 2 Puff(s) Inhalation two times a day  dabrafenib 150 milliGRAM(s) Oral every 12 hours  enoxaparin Injectable 40 milliGRAM(s) SubCutaneous every 24 hours  levothyroxine 200 MICROGram(s) Oral daily  magnesium hydroxide Suspension 30 milliLiter(s) Oral three times a day  magnesium oxide 400 milliGRAM(s) Oral three times a day with meals  metoprolol tartrate 12.5 milliGRAM(s) Oral two times a day  mirtazapine 15 milliGRAM(s) Oral at bedtime  montelukast 10 milliGRAM(s) Oral daily  pantoprazole    Tablet 40 milliGRAM(s) Oral before breakfast  polyethylene glycol 3350 17 Gram(s) Oral daily  predniSONE   Tablet 10 milliGRAM(s) Oral two times a day  senna 2 Tablet(s) Oral at bedtime  tamsulosin 0.4 milliGRAM(s) Oral at bedtime  trametinib 2 milliGRAM(s) Oral daily  trimethoprim  160 mG/sulfamethoxazole 800 mG 1 Tablet(s) Oral <User Schedule>    PRN MEDICATIONS  albuterol    90 MICROgram(s) HFA Inhaler 2 Puff(s) Inhalation every 6 hours PRN  ondansetron    Tablet 4 milliGRAM(s) Oral every 8 hours PRN  oxycodone    5 mG/acetaminophen 325 mG 2 Tablet(s) Oral every 4 hours PRN                                            ------------------------------------------------------------  VITAL SIGNS: Last 24 Hours  T(C): 37.4 (2023 04:21), Max: 37.4 (2023 13:22)  T(F): 99.4 (2023 04:21), Max: 99.4 (2023 16:50)  HR: 101 (2023 04:21) (98 - 101)  BP: 136/84 (2023 04:21) (98/71 - 136/84)  BP(mean): --  RR: 18 (2023 04:21) (18 - 18)  SpO2: 96% (2023 21:33) (96% - 96%)                                             --------------------------------------------------------------  LABS:                        7.8    10.35 )-----------( 250      ( 2023 11:12 )             24.8     06-    138  |  100  |  17  ----------------------------<  107<H>  4.6   |  26  |  0.7    Ca    8.4      2023 11:12  Mg     2.0         TPro  5.0<L>  /  Alb  3.1<L>  /  TBili  2.7<H>  /  DBili  x   /  AST  211<H>  /  ALT  100<H>  /  AlkPhos  725<H>        Urinalysis Basic - ( 2023 08:40 )    Color: Yellow / Appearance: Clear / S.017 / pH: x  Gluc: x / Ketone: Negative  / Bili: Negative / Urobili: <2 mg/dL   Blood: x / Protein: Trace / Nitrite: Negative   Leuk Esterase: Negative / RBC: x / WBC x   Sq Epi: x / Non Sq Epi: x / Bacteria: x                                                            -------------------------------------------------------------  RADIOLOGY:                                            --------------------------------------------------------------    PHYSICAL EXAM:                                             --------------------------------------------------------------                 JOAQUIN HERNANDEZ 63y Male  MRN#: [id number]     Hospital Day: 6d    CC:  L thigh hematoma    HOSPITAL COURSE:   63M. PMH: thyroid cancer with mets (Follows at [facility]; on trametinib, dabrafenib), h/o of kyphoplasty 2/ bone mets, h/o recent RLE DVT on lovenox, HTN, COPD, Hypothyroidism   Presented  from home with inability to ambulate. Patient reports he started to experience intermittent pain in L thigh and went for CT and duplex today. When came back, started to experience unbearable pain in L high and observed some bruising limiting his mobility.    VS WNL  Labs notable for hgb 7.5 (11.6 in 2023), Na 131, K 5.4 hemolyzed (rpt WNL)  CTAP/CT thigh showed hematoma in L rectus femoris    Admitted to SDU for L thigh hematoma  Hgb downtrended to 6.5 - completed 1u pRBCs, now 7.4  Vascular on board, was considering IVC filter, but pt not a candidate since no DVT on LE duplex. Recommending DVT ppx, but no full AC for now. Recommend to start AC once Hgb stabilizes. Surgery following - no acute intervention.   : Monitor for 24-48H while on DVT ppx for signs of bleeding, and ensure hgb remains stable.   : Pt noted to have spiked fevers overnight - septic workup to be obtained. Septic workup (-), fever likely medication induced.   2: Transaminitis and elevated bili noted.      SUBJECTIVE     Overnight events  None    Subjective complaints   Pt evaluated at bedside. No active complaints. Patient and wife are frustrated with duration with hospital stay. Explained to them that his labs have caused a change, and we need to rule out gallbladder and liver path so he does not go home and come back with an infection. They understood.                                             ----------------------------------------------------------  OBJECTIVE  PAST MEDICAL & SURGICAL HISTORY  Thyroid cancer    HTN (hypertension)    COPD (chronic obstructive pulmonary disease)    Borderline diabetes    H/O thyroidectomy                                              -----------------------------------------------------------  ALLERGIES:  Allergy Status Unknown                                            ------------------------------------------------------------    HOME MEDICATIONS  Home Medications:  Advair Diskus: 2 puff(s) inhaled 2 times a day (2023 11:15)  Bactrim  mg-160 mg oral tablet: 1 tab(s) orally Monday, Wednesday, and Friday (28 May 2023 02:01)  dabrafenib 75 mg oral capsule: 2 orally 2 times a day (2023 11:15)  esomeprazole 40 mg oral delayed release capsule: 1 tab(s) orally once a day (28 May 2023 01:59)  Flomax 0.4 mg oral capsule: 1 orally once a day (2023 11:15)  levothyroxine 200 mcg (0.2 mg) oral capsule: 1 cap(s) orally once a day (28 May 2023 02:02)  mirtazapine 15 mg oral tablet: 1 tab(s) orally once a day (at bedtime) (28 May 2023 01:59)  Percocet 10 mg-325 mg oral tablet: 1 orally every 4 hours as needed for  severe pain (2023 11:16)  pravastatin 40 mg oral tablet: 1 tab(s) orally once a day (2023 11:15)  predniSONE 10 mg oral tablet: 1 orally 2 times a day (2023 11:15)  Singulair 10 mg oral tablet: 1 tab(s) orally once a day (2023 11:15)  Spiriva 18 mcg inhalation capsule: 1 cap(s) inhaled once a day (2023 11:15)  Toprol-XL 25 mg oral tablet, extended release: 1 tab(s) orally once a day (2023 11:15)  trametinib 2 mg oral tablet: 1 tab(s) orally once a day (28 May 2023 02:01)  Ventolin HFA 90 mcg/inh inhalation aerosol: 2 puff(s) inhaled 4 times a day (2023 11:15)                           MEDICATIONS:  STANDING MEDICATIONS  albuterol/ipratropium for Nebulization 3 milliLiter(s) Nebulizer every 6 hours  atorvastatin 10 milliGRAM(s) Oral at bedtime  budesonide 160 MICROgram(s)/formoterol 4.5 MICROgram(s) Inhaler 2 Puff(s) Inhalation two times a day  dabrafenib 150 milliGRAM(s) Oral every 12 hours  enoxaparin Injectable 40 milliGRAM(s) SubCutaneous every 24 hours  levothyroxine 200 MICROGram(s) Oral daily  magnesium hydroxide Suspension 30 milliLiter(s) Oral three times a day  magnesium oxide 400 milliGRAM(s) Oral three times a day with meals  metoprolol tartrate 12.5 milliGRAM(s) Oral two times a day  mirtazapine 15 milliGRAM(s) Oral at bedtime  montelukast 10 milliGRAM(s) Oral daily  pantoprazole    Tablet 40 milliGRAM(s) Oral before breakfast  polyethylene glycol 3350 17 Gram(s) Oral daily  predniSONE   Tablet 10 milliGRAM(s) Oral two times a day  senna 2 Tablet(s) Oral at bedtime  tamsulosin 0.4 milliGRAM(s) Oral at bedtime  trametinib 2 milliGRAM(s) Oral daily  trimethoprim  160 mG/sulfamethoxazole 800 mG 1 Tablet(s) Oral <User Schedule>    PRN MEDICATIONS  albuterol    90 MICROgram(s) HFA Inhaler 2 Puff(s) Inhalation every 6 hours PRN  ondansetron    Tablet 4 milliGRAM(s) Oral every 8 hours PRN  oxycodone    5 mG/acetaminophen 325 mG 2 Tablet(s) Oral every 4 hours PRN                                            ------------------------------------------------------------  VITAL SIGNS: Last 24 Hours  T(C): 37.4 (2023 04:21), Max: 37.4 (2023 13:22)  T(F): 99.4 (2023 04:21), Max: 99.4 (2023 16:50)  HR: 101 (2023 04:21) (98 - 101)  BP: 136/84 (2023 04:21) (98/71 - 136/84)  BP(mean): --  RR: 18 (2023 04:21) (18 - 18)  SpO2: 96% (2023 21:33) (96% - 96%)                                             --------------------------------------------------------------  LABS:                        7.8    10.35 )-----------( 250      ( 2023 11:12 )             24.8     06-01    138  |  100  |  17  ----------------------------<  107<H>  4.6   |  26  |  0.7    Ca    8.4      2023 11:12  Mg     2.0     06-01    TPro  5.0<L>  /  Alb  3.1<L>  /  TBili  2.7<H>  /  DBili  x   /  AST  211<H>  /  ALT  100<H>  /  AlkPhos  725<H>  06-01      Urinalysis Basic - ( 2023 08:40 )    Color: Yellow / Appearance: Clear / S.017 / pH: x  Gluc: x / Ketone: Negative  / Bili: Negative / Urobili: <2 mg/dL   Blood: x / Protein: Trace / Nitrite: Negative   Leuk Esterase: Negative / RBC: x / WBC x   Sq Epi: x / Non Sq Epi: x / Bacteria: x                                                            -------------------------------------------------------------  RADIOLOGY:                                            --------------------------------------------------------------    PHYSICAL EXAM:  GEN: NAD  NEURO: Alert & Orientedx4,   HEENT: nontraumatic, normocephalic, neck supple  CARD: S1, S2 audible, no S3, regular rate and rhythm, no murmur  PULM: B/L breath sounds, no wheezing, crackles, or rales  ABD: Soft, nondistended, nontender, Adhikari sign (-)  EXTR: tenderness to LLE, left thigh in ACE wrap                                           --------------------------------------------------------------

## 2023-06-02 NOTE — CONSULT NOTE ADULT - SUBJECTIVE AND OBJECTIVE BOX
GENERAL SURGERY CONSULT NOTE    Patient: JOAQUIN HERNANDEZ , 63y ([birth year])Male   MRN: [id number]  Location: [facility] ED  Visit: 05-27-23 Emergency  Date: 05-27-23 @ 20:38    HPI: 63 year old Male with PMHx of thyroid cancer with mets, h/o of kyphoplasty, h/o recent DVT in RLE on lovenox presents to the ED for left thigh pain that began a few days ago. Patient states the pain was initially intermittent. Does not recall any fall or trauma to the left lower extremity. States he follows with [facility] for thyroid cancer and had imaging (CT and duplex) done today, but when he returned home, he was unable to ambulate due to severe pain in the left thigh. States that he noticed bruising to the left thigh medially today. Of note, patient states he was recently diagnosed with RLE DVT and started on lovenox a few weeks ago; denies any DVT in the LLE.    PAST MEDICAL & SURGICAL HISTORY:  Thyroid cancer  HTN (hypertension)  COPD (chronic obstructive pulmonary disease)  Borderline diabetes  H/O thyroidectomy    Home Medications:  Advair Diskus: 2 puff(s) inhaled 2 times a day (07 Feb 2020 07:22)  Bactrim  mg-160 mg oral tablet: 1 orally 3 times a week Monday, Wednesday,  Friday (20 Apr 2023 03:38)  dabrafenib 75 mg oral capsule: 2 orally 2 times a day (20 Apr 2023 03:36)  levothyroxine 175 mcg (0.175 mg) oral tablet: 1 tab(s) orally once a day (23 Nov 2019 18:22)  omeprazole 40 mg oral delayed release capsule: 1 cap(s) orally once a day (23 Nov 2019 18:22)  pravastatin 40 mg oral tablet: 1 tab(s) orally once a day (23 Nov 2019 18:22)  predniSONE 10 mg oral tablet: 1 orally 2 times a day (20 Apr 2023 03:35)  Singulair 10 mg oral tablet: 1 tab(s) orally once a day (23 Nov 2019 18:22)  Spiriva 18 mcg inhalation capsule: 1 cap(s) inhaled once a day (23 Nov 2019 18:22)  Toprol-XL 25 mg oral tablet, extended release: 1 tab(s) orally once a day (23 Nov 2019 18:22)  Ventolin HFA 90 mcg/inh inhalation aerosol: 2 puff(s) inhaled 4 times a day (23 Nov 2019 18:22)  Zofran 4 mg oral tablet: 1 orally every 8 hours (20 Apr 2023 03:37)    VITALS:  T(F): 97.8 (05-27-23 @ 18:58), Max: 98.7 (05-27-23 @ 14:14)  HR: 98 (05-27-23 @ 18:58) (62 - 98)  BP: 120/87 (05-27-23 @ 18:58) (107/51 - 124/81)  RR: 20 (05-27-23 @ 18:58) (18 - 20)  SpO2: 97% (05-27-23 @ 18:58) (97% - 97%)    PHYSICAL EXAM:  General: NAD, AAOx3, calm and cooperative  HEENT: NCAT, Trachea ML, Neck supple  Cardiac: RRR S1, S2  Respiratory: Chest rise equal bilaterally, no labored breathing  Abdomen: Soft, non-distended, non-tender  Extremities: BARRIOS, compartments soft, tenderness to LLE, ecchymosis present on left medial thigh   Musculoskeletal: Strength 5/5 BL UE/LE, ROM intact, compartments soft  Neuro: Sensation grossly intact and equal throughout, no focal deficits  Vascular: Pulses 2+ throughout, extremities well perfused  Skin: Warm/dry, normal color    MEDICATIONS  (STANDING):    MEDICATIONS  (PRN):      LAB/STUDIES:                        7.5    9.48  )-----------( 205      ( 27 May 2023 16:30 )             23.5     05-27    131<L>  |  98  |  16  ----------------------------<  120<H>  5.4<H>   |  24  |  0.7    Ca    8.7      27 May 2023 16:30    TPro  5.4<L>  /  Alb  3.1<L>  /  TBili  0.4  /  DBili  x   /  AST  25  /  ALT  21  /  AlkPhos  189<H>  05-27    PT/INR - ( 27 May 2023 16:30 )   PT: 12.00 sec;   INR: 1.05 ratio      PTT - ( 27 May 2023 16:30 )  PTT:30.8 sec  LIVER FUNCTIONS - ( 27 May 2023 16:30 )  Alb: 3.1 g/dL / Pro: 5.4 g/dL / ALK PHOS: 189 U/L / ALT: 21 U/L / AST: 25 U/L / GGT: x             IMAGING:  < from: CT Lower Extremity No Cont, Left (05.27.23 @ 17:09) >  IMPRESSION:    Intramuscular hematoma within the rectus femoris measuring 14.9 x 5 x 1.9  cm. Layering hyperdense material within the hematoma likely reflecting   acute blood products.    Partially visualized intermediate density collections within the left   lower abdominal wall, likely also reflecting blood products. Correlate   clinically.      
VASCULAR SURGERY CONSULT NOTE      HPI:  SUBJECTIVE:  63y Male with PMH of thyroid cancer with mets, h/o of kyphoplasty, h/o recent RLE DVT on lovenox presents from home with inability to ambulate. Patient reports he started to experience intermittent pain in L thigh and went for CT and duplex today. When came back, started to experience unbearable pain in L high and observed some bruising limiting his mobility. Therefore presents to the ED  Denies any fever, chills, headache, neurological deficits, nausea, vomiting, chest pain, palpitations, shortness of breathe, cough, abdominal pain, hematochezia, melena, hematemesis, diarrhea or constipation    In the ED:-  Labs notable for hgb 7.5 (11.6 in 04/2023), Na 131, K 5.4 hemolyzed  CTAP/CT thigh showed hematoma in L rectus femoris  T(C): 36.6 (05-27-23 @ 18:58), Max: 37.1 (05-27-23 @ 14:14)  T(F): 97.8 (05-27-23 @ 18:58), Max: 98.7 (05-27-23 @ 14:14)  HR: 98 (05-27-23 @ 18:58) (62 - 98)  BP: 120/87 (05-27-23 @ 18:58) (107/51 - 124/81)  RR: 20 (05-27-23 @ 18:58) (18 - 20)  SpO2: 97% (05-27-23 @ 18:58) (97% - 97%)    Admitted to [facility] for L thigh hematoma (27 May 2023 22:46)    Vascular Surgery consulted for evaluation for IVC Filter. The patient was recently placed on Lovenox for a RLE DVT however now presents to the hospital this admission with a left thigh hematoma. The patient was never on anticoagulation prior to this and has never had a DVT before this that was diagnosed on 04/20/2023. The patient has no history of vascular surgery. Normally ambualtes independently. Never smoker.     PAST MEDICAL & SURGICAL HISTORY:  Thyroid cancer      HTN (hypertension)      COPD (chronic obstructive pulmonary disease)      Borderline diabetes      H/O thyroidectomy        Allergy Status Unknown    Home Medications:  Advair Diskus: 2 puff(s) inhaled 2 times a day (07 Feb 2020 07:22)  Bactrim  mg-160 mg oral tablet: 1 tab(s) orally Monday, Wednesday, and Friday (28 May 2023 02:01)  dabrafenib 75 mg oral capsule: 2 orally 2 times a day (20 Apr 2023 03:36)  esomeprazole 40 mg oral delayed release capsule: 1 tab(s) orally once a day (28 May 2023 01:59)  levothyroxine 200 mcg (0.2 mg) oral capsule: 1 cap(s) orally once a day (28 May 2023 02:02)  mirtazapine 15 mg oral tablet: 1 tab(s) orally once a day (at bedtime) (28 May 2023 01:59)  pravastatin 40 mg oral tablet: 1 tab(s) orally once a day (23 Nov 2019 18:22)  predniSONE 10 mg oral tablet: 1 orally 2 times a day (20 Apr 2023 03:35)  Singulair 10 mg oral tablet: 1 tab(s) orally once a day (23 Nov 2019 18:22)  Spiriva 18 mcg inhalation capsule: 1 cap(s) inhaled once a day (23 Nov 2019 18:22)  Toprol-XL 25 mg oral tablet, extended release: 1 tab(s) orally once a day (23 Nov 2019 18:22)  trametinib 2 mg oral tablet: 1 tab(s) orally once a day (28 May 2023 02:01)  Ventolin HFA 90 mcg/inh inhalation aerosol: 2 puff(s) inhaled 4 times a day (23 Nov 2019 18:22)    No permtinent family history of PVD    REVIEW OF SYSTEMS:  GENERAL:                                         negative  SKIN:                                                 negative  OPTHALMOLOGIC:                          negative  ENMT:                                               negative  RESPIRATORY AND THORAX:        negative  CARDIOVASCULAR:                         negative  GASTROINTESTINAL:                       negative  NEPHROLOGY:                                  negative  MUSCULOSKELETAL:                       negative  NEUROLOGIC:                                   negative  PSYCHIATRIC:                                    negative  HEMATOLOGY/LYMPHATICS:         negative  ENDOCRINE:                                     negative  ALLERGIC/IMMUNOLOGIC:            negative    12 point ROS otherwise normal except as stated in HPI    PHYSICAL EXAM  Vital Signs Last 24 Hrs  T(C): 36.6 (28 May 2023 04:30), Max: 37.1 (27 May 2023 14:14)  T(F): 97.9 (28 May 2023 04:30), Max: 98.7 (27 May 2023 14:14)  HR: 100 (28 May 2023 04:30) (62 - 100)  BP: 133/93 (28 May 2023 04:30) (107/51 - 133/93)  BP(mean): --  RR: 18 (28 May 2023 04:30) (18 - 20)  SpO2: 97% (28 May 2023 04:30) (97% - 97%)    Parameters below as of 28 May 2023 04:30  Patient On (Oxygen Delivery Method): room air        PHYSICAL EXAM:  General: NAD, AAOx3, calm and cooperative  HEENT: NCAT, Trachea ML, Neck supple  Cardiac: RRR S1, S2  Respiratory: Chest rise equal bilaterally, no labored breathing  Abdomen: Soft, non-distended, non-tender  Extremities: BARRIOS, compartments soft, tenderness to LLE, ecchymosis present on left medial thigh   Musculoskeletal: Strength 5/5 BL UE/LE, ROM intact, compartments soft  Neuro: Sensation grossly intact and equal throughout, no focal deficits  Vascular: Pulses 2+ throughout, extremities well perfused  Skin: Warm/dry, normal color      PULSES:  Femoral: palpable bilaterally  Dorsal Pedal: palpable bilaterally  Posterior Tibial: palpable bilaterally  Capillary: good refill    MEDICATIONS:   MEDICATIONS  (STANDING):  albuterol/ipratropium for Nebulization 3 milliLiter(s) Nebulizer every 12 hours  atorvastatin 10 milliGRAM(s) Oral at bedtime  budesonide 160 MICROgram(s)/formoterol 4.5 MICROgram(s) Inhaler 2 Puff(s) Inhalation two times a day  dabrafenib 150 milliGRAM(s) Oral every 12 hours  levothyroxine 200 MICROGram(s) Oral daily  metoprolol tartrate 12.5 milliGRAM(s) Oral two times a day  mirtazapine 15 milliGRAM(s) Oral at bedtime  montelukast 10 milliGRAM(s) Oral daily  pantoprazole    Tablet 40 milliGRAM(s) Oral before breakfast  predniSONE   Tablet 10 milliGRAM(s) Oral two times a day  senna 2 Tablet(s) Oral at bedtime  tamsulosin 0.4 milliGRAM(s) Oral at bedtime  trametinib 2 milliGRAM(s) Oral daily  trimethoprim  160 mG/sulfamethoxazole 800 mG 1 Tablet(s) Oral <User Schedule>    MEDICATIONS  (PRN):  albuterol    90 MICROgram(s) HFA Inhaler 2 Puff(s) Inhalation every 6 hours PRN Shortness of Breath and/or Wheezing  oxycodone    5 mG/acetaminophen 325 mG 2 Tablet(s) Oral every 4 hours PRN Moderate Pain (4 - 6)      LAB/STUDIES:                        7.5    9.48  )-----------( 205      ( 27 May 2023 16:30 )             23.5     05-27    131<L>  |  98  |  16  ----------------------------<  120<H>  5.4<H>   |  24  |  0.7    Ca    8.7      27 May 2023 16:30    TPro  5.4<L>  /  Alb  3.1<L>  /  TBili  0.4  /  DBili  x   /  AST  25  /  ALT  21  /  AlkPhos  189<H>  05-27    PT/INR - ( 27 May 2023 16:30 )   PT: 12.00 sec;   INR: 1.05 ratio         PTT - ( 27 May 2023 16:30 )  PTT:30.8 sec  LIVER FUNCTIONS - ( 27 May 2023 16:30 )  Alb: 3.1 g/dL / Pro: 5.4 g/dL / ALK PHOS: 189 U/L / ALT: 21 U/L / AST: 25 U/L / GGT: x               IMAGING:  < from: CT Angio Abdomen and Pelvis w/ IV Cont (05.27.23 @ 20:49) >    No CTA evidence of active extravasation within the abdomen or pelvis.    Partially visualized left thigh hematoma, better visualized on recent CT   lower extremity.    Multiple collections within the abdominal wall subcutaneous tissues which   may reflect blood products.    < end of copied text >    < from: CT Lower Extremity No Cont, Left (05.27.23 @ 17:09) >  Intramuscular hematoma within the rectus femoris measuring 14.9 x 5 x 1.9  cm. Layering hyperdense material within the hematoma likely reflecting   acute blood products.    Partially visualized intermediate density collections within the left   lower abdominal wall, likely also reflecting blood products. Correlate   clinically.    < end of copied text >    
Gastroenterology Consultation:    Patient is a 63y old  Male who presents with a chief complaint of inability to ambulate (02 Jun 2023 13:58)      Admitted on: 05-27-23  HPI:  SUBJECTIVE:  63y Male with PMH of thyroid cancer with mets, h/o of kyphoplasty, h/o recent RLE DVT on lovenox presents from home with inability to ambulate admitted for further workup. Hepatology was consulted for elevated LFT.   He never had any liver infection or elevated LFT. He was started on darafinib in dec 2022 and trametinib on may 1st for thyroid cancer. Bactrim he has been taking since february. Denies any abdominal pain, nausea, vomiting, hematemesis, melena, hematochezia, weight loss, herbal medications or OTC medications       Prior EGD: never had   Prior Colonoscopy: never had       PAST MEDICAL & SURGICAL HISTORY:  Thyroid cancer      HTN (hypertension)      COPD (chronic obstructive pulmonary disease)      Borderline diabetes      H/O thyroidectomy          FAMILY HISTORY:  No pertinent family history in first degree relatives        Social History:  Tobacco: N  Alcohol: N  Drugs: N    Home Medications:  Advair Diskus: 2 puff(s) inhaled 2 times a day (01 Jun 2023 11:15)  Bactrim  mg-160 mg oral tablet: 1 tab(s) orally Monday, Wednesday, and Friday (28 May 2023 02:01)  dabrafenib 75 mg oral capsule: 2 orally 2 times a day (01 Jun 2023 11:15)  esomeprazole 40 mg oral delayed release capsule: 1 tab(s) orally once a day (28 May 2023 01:59)  Flomax 0.4 mg oral capsule: 1 orally once a day (01 Jun 2023 11:15)  levothyroxine 200 mcg (0.2 mg) oral capsule: 1 cap(s) orally once a day (28 May 2023 02:02)  mirtazapine 15 mg oral tablet: 1 tab(s) orally once a day (at bedtime) (28 May 2023 01:59)  Percocet 10 mg-325 mg oral tablet: 1 orally every 4 hours as needed for  severe pain (01 Jun 2023 11:16)  pravastatin 40 mg oral tablet: 1 tab(s) orally once a day (01 Jun 2023 11:15)  predniSONE 10 mg oral tablet: 1 orally 2 times a day (01 Jun 2023 11:15)  Singulair 10 mg oral tablet: 1 tab(s) orally once a day (01 Jun 2023 11:15)  Spiriva 18 mcg inhalation capsule: 1 cap(s) inhaled once a day (01 Jun 2023 11:15)  Toprol-XL 25 mg oral tablet, extended release: 1 tab(s) orally once a day (01 Jun 2023 11:15)  trametinib 2 mg oral tablet: 1 tab(s) orally once a day (28 May 2023 02:01)  Ventolin HFA 90 mcg/inh inhalation aerosol: 2 puff(s) inhaled 4 times a day (01 Jun 2023 11:15)    MEDICATIONS  (STANDING):  albuterol/ipratropium for Nebulization 3 milliLiter(s) Nebulizer every 6 hours  budesonide 160 MICROgram(s)/formoterol 4.5 MICROgram(s) Inhaler 2 Puff(s) Inhalation two times a day  dabrafenib 150 milliGRAM(s) Oral every 12 hours  enoxaparin Injectable 40 milliGRAM(s) SubCutaneous every 24 hours  levothyroxine 200 MICROGram(s) Oral daily  magnesium oxide 400 milliGRAM(s) Oral three times a day with meals  metoprolol tartrate 12.5 milliGRAM(s) Oral two times a day  mirtazapine 15 milliGRAM(s) Oral at bedtime  montelukast 10 milliGRAM(s) Oral daily  pantoprazole    Tablet 40 milliGRAM(s) Oral before breakfast  polyethylene glycol 3350 17 Gram(s) Oral daily  predniSONE   Tablet 10 milliGRAM(s) Oral two times a day  senna 2 Tablet(s) Oral at bedtime  tamsulosin 0.4 milliGRAM(s) Oral at bedtime  trametinib 2 milliGRAM(s) Oral daily  trimethoprim  160 mG/sulfamethoxazole 800 mG 1 Tablet(s) Oral <User Schedule>    MEDICATIONS  (PRN):  albuterol    90 MICROgram(s) HFA Inhaler 2 Puff(s) Inhalation every 6 hours PRN Shortness of Breath and/or Wheezing  ondansetron    Tablet 4 milliGRAM(s) Oral every 8 hours PRN Nausea and/or Vomiting  oxycodone    5 mG/acetaminophen 325 mG 2 Tablet(s) Oral every 4 hours PRN Moderate Pain (4 - 6)      Allergies  Allergy Status Unknown      Review of Systems:   Constitutional:  No Fever, No Chills  ENT/Mouth:  No Hearing Changes,  No Difficulty Swallowing  Eyes:  No Eye Pain, No Vision Changes  Cardiovascular:  No Chest Pain, No Palpitations  Respiratory:  No Cough, No Dyspnea  Gastrointestinal:  As described in HPI  Musculoskeletal:  No Joint Swelling, No Back Pain  Skin:  No Skin Lesions, No Jaundice  Neuro:  No Syncope, No Dizziness  Heme/Lymph:  No Bruising, No Bleeding.          Physical Examination:  T(C): 36.7 (06-02-23 @ 12:19), Max: 37.4 (06-01-23 @ 16:50)  HR: 93 (06-02-23 @ 12:19) (93 - 101)  BP: 105/70 (06-02-23 @ 12:19) (98/71 - 136/84)  RR: 18 (06-02-23 @ 12:19) (18 - 18)  SpO2: 96% (06-02-23 @ 08:06) (96% - 96%)      06-02-23 @ 07:01  -  06-02-23 @ 15:50  --------------------------------------------------------  IN: 0 mL / OUT: 100 mL / NET: -100 mL        Constitutional: No acute distress.  Eyes:. Conjunctivae are clear, Sclera is non-icteric.  Ears Nose and Throat: The external ears are normal appearing,  Oral mucosa is pink and moist.  Respiratory:  No signs of respiratory distress. Lung sounds are clear bilaterally.  Cardiovascular:  S1 S2, Regular rate and rhythm.  GI: Abdomen is soft, symmetric, and non-tender without distention. There are no visible lesions or scars. Bowel sounds are present and normoactive in all four quadrants. No masses, hepatomegaly, or splenomegaly are noted.   Neuro: No Tremor, No involuntary movements  Skin: No rashes, No Jaundice.          Data:                        8.1    10.82 )-----------( 286      ( 02 Jun 2023 07:39 )             26.1     Hgb Trend:  8.1  06-02-23 @ 07:39  7.8  06-01-23 @ 11:12  8.0  06-01-23 @ 08:59  7.8  05-31-23 @ 07:31  8.1  05-30-23 @ 17:42        06-02    139  |  100  |  16  ----------------------------<  93  5.1<H>   |  30  |  0.7    Ca    8.6      02 Jun 2023 07:39  Mg     2.1     06-02    TPro  5.2<L>  /  Alb  2.8<L>  /  TBili  1.4<H>  /  DBili  0.8<H>  /  AST  173<H>  /  ALT  178<H>  /  AlkPhos  945<H>  06-02    Liver panel trend:  TBili 1.4   /      /      /   AlkP 945   /   Tptn 5.2   /   Alb 2.8    /   DBili 0.8      06-02  TBili 1.5   /      /      /   AlkP 920   /   Tptn 5.3   /   Alb 3.0    /   DBili --      06-02  TBili 2.7   /      /      /   AlkP 725   /   Tptn 5.0   /   Alb 3.1    /   DBili --      06-01  TBili 1.8   /      /   ALT 75   /   AlkP 672   /   Tptn 5.1   /   Alb 3.1    /   DBili --      06-01  TBili 0.6   /   AST 15   /   ALT 13   /   AlkP 186   /   Tptn 5.1   /   Alb 2.9    /   DBili --      05-31  TBili 0.3   /   AST 20   /   ALT 14   /   AlkP 179   /   Tptn 5.3   /   Alb 3.1    /   DBili --      05-30  TBili 0.5   /   AST 15   /   ALT 18   /   AlkP 176   /   Tptn 5.0   /   Alb 3.0    /   DBili --      05-28  TBili 0.4   /   AST 25   /   ALT 21   /   AlkP 189   /   Tptn 5.4   /   Alb 3.1    /   DBili --      05-27              Radiology:

## 2023-06-02 NOTE — CONSULT NOTE ADULT - ASSESSMENT
Recs:   -Recommended MRI but as per family he couldn't tolerate   -check US abdomen   -can start ursodiol 500 once a day   -check HAV IgM/IgG, HBsAg, HBsAb, HBcAb IgM/IgG, HCV Ab, Anti HEV, Serum Ferritin, Transferrin Saturation, Ceruloplasmin level, NILO, SMA, immunoglobulins panel, AMA, Anti LK microsomal Ab, anti-soluble liver Ag, EBV, HSV, CMV, ACE levels      63y Male with PMH of thyroid cancer with mets, h/o of kyphoplasty, h/o recent RLE DVT on lovenox presents from home with inability to ambulate admitted for further workup. Hepatology was consulted for elevated LFT.   He never had any liver infection or elevated LFT. He was started on darafinib in dec 2022 and trametinib on may 1st for thyroid cancer. Bactrim he has been taking since february. Denies any abdominal pain, nausea, vomiting, hematemesis, melena, hematochezia, weight loss, herbal medications or OTC medications     #)Acute inc liver enzymes cholestatic pattern DD: likely due to DILI vs r/o other causes   -hemodynamically stable   -LFT at the time of admission 0.4/189/25/21  -LFT started inc from 6/1 LFT today 6/2 1.4/945/173/178  -He was started on darafinib in dec 2022 and trametinib on may 1st for thyroid cancer. Bactrim he has been taking since february.   -No antibiotics in the hospital   -No documented episodes of hypotension   -Livertox; Darafenib can cause inc AST, ALT; trametinib can inc ALP  -LFT were normal in april and at the time of admission sudden inc in LFT in the hospital less likely from chemo medications however can't rule out    Recs:   -Recommended MRI but as per family he couldn't tolerate   -check US abdomen   -can start ursodiol 500 once a day   -check HAV IgM/IgG, HBsAg, HBsAb, HBcAb IgM/IgG, HCV Ab, Anti HEV, Serum Ferritin, Transferrin Saturation, Ceruloplasmin level, NILO, SMA, immunoglobulins panel, AMA, Anti LK microsomal Ab, anti-soluble liver Ag, EBV, HSV, CMV, ACE levels      63y Male with PMH of thyroid cancer with mets, h/o of kyphoplasty, h/o recent RLE DVT on lovenox presents from home with inability to ambulate admitted for further workup. Hepatology was consulted for elevated LFT.   He never had any liver infection or elevated LFT. He was started on darafinib in dec 2022 and trametinib on may 1st for thyroid cancer. Bactrim he has been taking since february. Denies any abdominal pain, nausea, vomiting, hematemesis, melena, hematochezia, weight loss, herbal medications or OTC medications     #)Acute inc liver enzymes cholestatic pattern DD: likely due to DILI vs r/o other causes   -hemodynamically stable   -LFT at the time of admission 0.4/189/25/21  -LFT started inc from 6/1 LFT today 6/2 1.4/945/173/178  -He was started on darafinib in dec 2022 and trametinib on may 1st for thyroid cancer. Bactrim he has been taking since february.   -No antibiotics in the hospital   -No documented episodes of hypotension   -Livertox; trametinib can inc alkaline phosphatase in 24% to 67% of patients treated with trametinib. Serum alkaline phosphatase elevations occurred in 26% of patients given dabrafenib alone, but in 60% to 67% given dabrafenib and trametinib  -LFT were normal in april and at the time of admission sudden inc in LFT in the hospital less likely from chemo medications however can't rule out    Recs:   -Recommended MRI but as per family he couldn't tolerate   -check US abdomen   -can start ursodiol 500 once a day   -check HAV IgM/IgG, HBsAg, HBsAb, HBcAb IgM/IgG, HCV Ab, Anti HEV, Serum Ferritin, Transferrin Saturation, Ceruloplasmin level, NILO, SMA, immunoglobulins panel, AMA, Anti LK microsomal Ab, anti-soluble liver Ag, EBV, HSV, CMV, ACE levels

## 2023-06-03 VITALS
TEMPERATURE: 98 F | RESPIRATION RATE: 18 BRPM | HEART RATE: 95 BPM | DIASTOLIC BLOOD PRESSURE: 86 MMHG | OXYGEN SATURATION: 97 % | SYSTOLIC BLOOD PRESSURE: 135 MMHG

## 2023-06-03 LAB
ALBUMIN SERPL ELPH-MCNC: 2.7 G/DL — LOW (ref 3.5–5.2)
ALP SERPL-CCNC: 798 U/L — HIGH (ref 30–115)
ALT FLD-CCNC: 112 U/L — HIGH (ref 0–41)
ANION GAP SERPL CALC-SCNC: 12 MMOL/L — SIGNIFICANT CHANGE UP (ref 7–14)
ANION GAP SERPL CALC-SCNC: 9 MMOL/L — SIGNIFICANT CHANGE UP (ref 7–14)
AST SERPL-CCNC: 55 U/L — HIGH (ref 0–41)
BASOPHILS # BLD AUTO: 0.03 K/UL — SIGNIFICANT CHANGE UP (ref 0–0.2)
BASOPHILS NFR BLD AUTO: 0.3 % — SIGNIFICANT CHANGE UP (ref 0–1)
BILIRUB SERPL-MCNC: 0.9 MG/DL — SIGNIFICANT CHANGE UP (ref 0.2–1.2)
BUN SERPL-MCNC: 12 MG/DL — SIGNIFICANT CHANGE UP (ref 10–20)
BUN SERPL-MCNC: 16 MG/DL — SIGNIFICANT CHANGE UP (ref 10–20)
CALCIUM SERPL-MCNC: 8.4 MG/DL — SIGNIFICANT CHANGE UP (ref 8.4–10.5)
CALCIUM SERPL-MCNC: 8.7 MG/DL — SIGNIFICANT CHANGE UP (ref 8.4–10.5)
CHLORIDE SERPL-SCNC: 100 MMOL/L — SIGNIFICANT CHANGE UP (ref 98–110)
CHLORIDE SERPL-SCNC: 98 MMOL/L — SIGNIFICANT CHANGE UP (ref 98–110)
CO2 SERPL-SCNC: 25 MMOL/L — SIGNIFICANT CHANGE UP (ref 17–32)
CO2 SERPL-SCNC: 27 MMOL/L — SIGNIFICANT CHANGE UP (ref 17–32)
CREAT SERPL-MCNC: 0.7 MG/DL — SIGNIFICANT CHANGE UP (ref 0.7–1.5)
CREAT SERPL-MCNC: 0.8 MG/DL — SIGNIFICANT CHANGE UP (ref 0.7–1.5)
EGFR: 100 ML/MIN/1.73M2 — SIGNIFICANT CHANGE UP
EGFR: 104 ML/MIN/1.73M2 — SIGNIFICANT CHANGE UP
EOSINOPHIL # BLD AUTO: 0.37 K/UL — SIGNIFICANT CHANGE UP (ref 0–0.7)
EOSINOPHIL NFR BLD AUTO: 3.5 % — SIGNIFICANT CHANGE UP (ref 0–8)
GLUCOSE SERPL-MCNC: 148 MG/DL — HIGH (ref 70–99)
GLUCOSE SERPL-MCNC: 91 MG/DL — SIGNIFICANT CHANGE UP (ref 70–99)
HCT VFR BLD CALC: 26.9 % — LOW (ref 42–52)
HCV AB S/CO SERPL IA: 0.03 COI — SIGNIFICANT CHANGE UP
HCV AB SERPL-IMP: SIGNIFICANT CHANGE UP
HGB BLD-MCNC: 8.2 G/DL — LOW (ref 14–18)
IMM GRANULOCYTES NFR BLD AUTO: 1.5 % — HIGH (ref 0.1–0.3)
LYMPHOCYTES # BLD AUTO: 1.64 K/UL — SIGNIFICANT CHANGE UP (ref 1.2–3.4)
LYMPHOCYTES # BLD AUTO: 15.4 % — LOW (ref 20.5–51.1)
MAGNESIUM SERPL-MCNC: 2 MG/DL — SIGNIFICANT CHANGE UP (ref 1.8–2.4)
MCHC RBC-ENTMCNC: 27.1 PG — SIGNIFICANT CHANGE UP (ref 27–31)
MCHC RBC-ENTMCNC: 30.5 G/DL — LOW (ref 32–37)
MCV RBC AUTO: 88.8 FL — SIGNIFICANT CHANGE UP (ref 80–94)
MONOCYTES # BLD AUTO: 1.12 K/UL — HIGH (ref 0.1–0.6)
MONOCYTES NFR BLD AUTO: 10.5 % — HIGH (ref 1.7–9.3)
NEUTROPHILS # BLD AUTO: 7.3 K/UL — HIGH (ref 1.4–6.5)
NEUTROPHILS NFR BLD AUTO: 68.8 % — SIGNIFICANT CHANGE UP (ref 42.2–75.2)
NRBC # BLD: 0 /100 WBCS — SIGNIFICANT CHANGE UP (ref 0–0)
PLATELET # BLD AUTO: 292 K/UL — SIGNIFICANT CHANGE UP (ref 130–400)
PMV BLD: 10.2 FL — SIGNIFICANT CHANGE UP (ref 7.4–10.4)
POTASSIUM SERPL-MCNC: 4.8 MMOL/L — SIGNIFICANT CHANGE UP (ref 3.5–5)
POTASSIUM SERPL-MCNC: 4.9 MMOL/L — SIGNIFICANT CHANGE UP (ref 3.5–5)
POTASSIUM SERPL-SCNC: 4.8 MMOL/L — SIGNIFICANT CHANGE UP (ref 3.5–5)
POTASSIUM SERPL-SCNC: 4.9 MMOL/L — SIGNIFICANT CHANGE UP (ref 3.5–5)
PROT SERPL-MCNC: 5.1 G/DL — LOW (ref 6–8)
RBC # BLD: 3.03 M/UL — LOW (ref 4.7–6.1)
RBC # FLD: 19.3 % — HIGH (ref 11.5–14.5)
SODIUM SERPL-SCNC: 135 MMOL/L — SIGNIFICANT CHANGE UP (ref 135–146)
SODIUM SERPL-SCNC: 136 MMOL/L — SIGNIFICANT CHANGE UP (ref 135–146)
TRANSFERRIN SERPL-MCNC: 147 MG/DL — LOW (ref 200–360)
WBC # BLD: 10.62 K/UL — SIGNIFICANT CHANGE UP (ref 4.8–10.8)
WBC # FLD AUTO: 10.62 K/UL — SIGNIFICANT CHANGE UP (ref 4.8–10.8)

## 2023-06-03 PROCEDURE — 99239 HOSP IP/OBS DSCHRG MGMT >30: CPT

## 2023-06-03 RX ORDER — RIVAROXABAN 15 MG-20MG
20 KIT ORAL DAILY
Refills: 0 | Status: DISCONTINUED | OUTPATIENT
Start: 2023-06-03 | End: 2023-06-03

## 2023-06-03 RX ORDER — RIVAROXABAN 15 MG-20MG
1 KIT ORAL
Qty: 1 | Refills: 0
Start: 2023-06-03 | End: 2023-07-02

## 2023-06-03 RX ORDER — URSODIOL 250 MG/1
1 TABLET, FILM COATED ORAL
Qty: 15 | Refills: 0
Start: 2023-06-03 | End: 2023-06-17

## 2023-06-03 RX ORDER — URSODIOL 250 MG/1
500 TABLET, FILM COATED ORAL
Refills: 0 | Status: DISCONTINUED | OUTPATIENT
Start: 2023-06-03 | End: 2023-06-03

## 2023-06-03 RX ORDER — URSODIOL 250 MG/1
1 TABLET, FILM COATED ORAL
Qty: 7 | Refills: 0
Start: 2023-06-03 | End: 2023-06-09

## 2023-06-03 RX ADMIN — MAGNESIUM OXIDE 400 MG ORAL TABLET 400 MILLIGRAM(S): 241.3 TABLET ORAL at 08:37

## 2023-06-03 RX ADMIN — Medication 10 MILLIGRAM(S): at 06:40

## 2023-06-03 RX ADMIN — PANTOPRAZOLE SODIUM 40 MILLIGRAM(S): 20 TABLET, DELAYED RELEASE ORAL at 06:40

## 2023-06-03 RX ADMIN — Medication 200 MICROGRAM(S): at 06:40

## 2023-06-03 RX ADMIN — DABRAFENIB 150 MILLIGRAM(S): 75 CAPSULE ORAL at 06:57

## 2023-06-03 RX ADMIN — Medication 3 MILLILITER(S): at 08:28

## 2023-06-03 RX ADMIN — Medication 12.5 MILLIGRAM(S): at 06:40

## 2023-06-03 NOTE — PROGRESS NOTE ADULT - PROVIDER SPECIALTY LIST ADULT
Internal Medicine
Vascular Surgery
Internal Medicine
Surgery
Hospitalist
Hospitalist
Internal Medicine
Internal Medicine
Surgery
Surgery
Hospitalist
Surgery

## 2023-06-03 NOTE — PROGRESS NOTE ADULT - REASON FOR ADMISSION
inability to ambulate

## 2023-06-03 NOTE — PROGRESS NOTE ADULT - SUBJECTIVE AND OBJECTIVE BOX
Patient is a 63y old  Male who presents with a chief complaint of inability to ambulate (30 May 2023 06:09)    HPI:  63y Male with PMH of thyroid cancer with mets, h/o of kyphoplasty, h/o recent RLE DVT on lovenox presents from home with inability to ambulate. Patient reports he started to experience intermittent pain in L thigh and went for CT and duplex today. When came back, started to experience unbearable pain in L high and observed some bruising limiting his mobility. Therefore presents to the ED  Denies any fever, chills, headache, neurological deficits, nausea, vomiting, chest pain, palpitations, shortness of breathe, cough, abdominal pain, hematochezia, melena, hematemesis, diarrhea or constipation   (27 May 2023 22:46)    PAST MEDICAL & SURGICAL HISTORY:  Thyroid cancer  HTN (hypertension)  COPD (chronic obstructive pulmonary disease)  Borderline diabetes  H/O thyroidectomy    patient seen and examined independently on morning rounds, chart reviewed and discussed with the medicine resident and on interdisciplinary rounds.      hospital day #7    afebrile >48 hrs-  feeling better - d/w patient and wife at length bedside- acute transaminitis improving-     PE:  GEN-NAD, AAOx3- wife present  PULM- fair air entry, ctab  CVS- +s1/s2 irreg irreg  GI- soft NT ND +bs, + ecchymosis diffuse (from lovenox injections)- no rebound, no guarding  EXT- left medial thigh fullness but less ttp- some varying degree of healing ecchymosis over anterior leg, knee-        Labs:                        8.2    10.62 )-----------( 292      ( 03 Jun 2023 08:02 )             26.9     CBC Full  -  ( 03 Jun 2023 08:02 )  WBC Count : 10.62 K/uL  RBC Count : 3.03 M/uL  Hemoglobin : 8.2 g/dL  Hematocrit : 26.9 %  Platelet Count - Automated : 292 K/uL  Mean Cell Volume : 88.8 fL  Mean Cell Hemoglobin : 27.1 pg  Mean Cell Hemoglobin Concentration : 30.5 g/dL  Auto Neutrophil # : 7.30 K/uL  Auto Lymphocyte # : 1.64 K/uL  Auto Monocyte # : 1.12 K/uL  Auto Eosinophil # : 0.37 K/uL  Auto Basophil # : 0.03 K/uL  Auto Neutrophil % : 68.8 %  Auto Lymphocyte % : 15.4 %  Auto Monocyte % : 10.5 %  Auto Eosinophil % : 3.5 %  Auto Basophil % : 0.3 %      06-03    135  |  98  |  12  ----------------------------<  91  4.9   |  25  |  0.7    Ca    8.7      03 Jun 2023 08:02  Mg     2.0     06-03    TPro  5.1<L>  /  Alb  2.7<L>  /  TBili  0.9  /  DBili  x   /  AST  55<H>  /  ALT  112<H>  /  AlkPhos  798<H>  06-03      Microbiology:    Culture - Blood (collected 01 Jun 2023 11:12)  Source: .Blood None  Preliminary Report (02 Jun 2023 22:01):    No growth to date.    Culture - Blood (collected 01 Jun 2023 11:12)  Source: .Blood None  Preliminary Report (02 Jun 2023 22:01):    No growth to date.        Vital Signs Last 24 Hrs  T(C): 36.9 (03 Jun 2023 04:40), Max: 37.4 (02 Jun 2023 21:25)  T(F): 98.5 (03 Jun 2023 04:40), Max: 99.3 (02 Jun 2023 21:25)  HR: 95 (03 Jun 2023 04:40) (93 - 99)  BP: 135/86 (03 Jun 2023 04:40) (105/70 - 135/86)  BP(mean): --  RR: 18 (03 Jun 2023 04:40) (18 - 18)  SpO2: 97% (03 Jun 2023 04:40) (97% - 97%)        I&O's Summary    02 Jun 2023 07:01  -  03 Jun 2023 07:00  --------------------------------------------------------  IN: 0 mL / OUT: 100 mL / NET: -100 mL             MEDICATIONS  (STANDING):  albuterol/ipratropium for Nebulization 3 milliLiter(s) Nebulizer every 6 hours  atorvastatin 10 milliGRAM(s) Oral at bedtime  budesonide 160 MICROgram(s)/formoterol 4.5 MICROgram(s) Inhaler 2 Puff(s) Inhalation two times a day  dabrafenib 150 milliGRAM(s) Oral every 12 hours  enoxaparin Injectable 40 milliGRAM(s) SubCutaneous every 24 hours  levothyroxine 200 MICROGram(s) Oral daily  magnesium oxide 400 milliGRAM(s) Oral three times a day with meals  metoprolol tartrate 12.5 milliGRAM(s) Oral two times a day  mirtazapine 15 milliGRAM(s) Oral at bedtime  montelukast 10 milliGRAM(s) Oral daily  pantoprazole    Tablet 40 milliGRAM(s) Oral before breakfast  polyethylene glycol 3350 17 Gram(s) Oral daily  predniSONE   Tablet 10 milliGRAM(s) Oral two times a day  senna 2 Tablet(s) Oral at bedtime  tamsulosin 0.4 milliGRAM(s) Oral at bedtime  trametinib 2 milliGRAM(s) Oral daily  trimethoprim  160 mG/sulfamethoxazole 800 mG 1 Tablet(s) Oral <User Schedule>

## 2023-06-03 NOTE — PROGRESS NOTE ADULT - ASSESSMENT
a/p:  62 yo M with hx of Thyroid cancer with mets, Recent RLE DVT (on Lovenox started 3 weeks ago at [facility]), Kyphoplasty presents to ED for with a complaint of Left thigh pain x 1 week.     #Left thigh spontaneous hematoma associated with AC use  #Acute blood loss anemia   - improving---less pain and able to ambulate including stairs  - no signs of compartment syndrome   - CT shows Intramuscular hematoma within the rectus femoris measuring 14.9 x 5 x 1.9 cm. Layering hyperdense material within the hematoma likely reflecting acute blood products with Partially visualized intermediate density collections within the left lower abdominal wall, likely also reflecting blood products.  - s/p evaluation by surgery in ED --> no acute surgical intervention.  -vascular surgery following---not candidate for IVC filter or other repair at this time   -continue to monitor closely  -fall precautions   -duplex neg for dvt (limited study)---will need repeat duplex in 2 weeks (h/o recent LE dvt that he will continue xarelto until f/u with vascular team as outaptietn)  -s/p 1u prbc transfusion  -  CBC stable hb (8.2 today)  - pain control and compression to left thigh    #Recent RLE DVT   - venous duplex LE negative (limited study)  -start on xarelto for outpatient NOAC   -patient is high risk for bleeding with or without falls/trauma- recomm fall precautions at home  -home PT  -  repeat LE duplex in 2 weeks after dc and will continue     #Acute transaminitis with elevated bilirrubin  #cholestatic DILI  #h/o prior cholelithiasis  -LFT improving  -tbili 2.7---->0.9    --->55  --->115  -appreciate hepatology consult and recom  -continue ursodiol 500 mg daily x 1 week  -f/u with outpatietn GI/Hepatolgy clinic including f/u all hepatic labs  sent this morning     -RVP and covid negative  -UA negative  -normal wbc  -tylenol ppx and prednisone 10 mg bid with chemo agents    #Hyperkalemia, mild   - low k diet   - resolved     #COPD   - not in exacerbation.   - c/w inhalers     #Thyroid cancer with mets   - follows  at [facility]   - patient is on chronic prednisone 10mg BID and Bactrim for ppx     #Hypothyroidism - on synthroid.     #HTN/DM2-- c/w home med     DVT/GI ppx  guarded prognisis    FULL CODE---discussed goc with patient and wife-  he is currently full code but will consider options for dnr/dni and d/w his doctors at [facility] as outpatient---continue to address goc with patient      time spendt on discharge >30 minutes including coordination of discharge care    discharge home today- plan for f/u with outpatient pcp (Dr. Burks), Onc at [facility], Hepatology clinic   a/p:  62 yo M with hx of Thyroid cancer with mets, Recent RLE DVT (on Lovenox started 3 weeks ago at [facility]), Kyphoplasty presents to ED for with a complaint of Left thigh pain x 1 week.     #Left thigh spontaneous hematoma associated with AC use  #Acute blood loss anemia   - improving---less pain and able to ambulate including stairs  - no signs of compartment syndrome   - CT shows Intramuscular hematoma within the rectus femoris measuring 14.9 x 5 x 1.9 cm. Layering hyperdense material within the hematoma likely reflecting acute blood products with Partially visualized intermediate density collections within the left lower abdominal wall, likely also reflecting blood products.  - s/p evaluation by surgery in ED --> no acute surgical intervention.  -vascular surgery following---not candidate for IVC filter or other repair at this time   -continue to monitor closely  -fall precautions   -duplex neg for dvt (limited study)---will need repeat duplex in 2 weeks (h/o recent LE dvt that he will continue xarelto until f/u with vascular team as outaptietn)  -s/p 1u prbc transfusion  -  CBC stable hb (8.2 today)  - pain control and compression to left thigh    #Recent RLE DVT   - venous duplex LE negative (limited study)  -start on xarelto for outpatient NOAC   -patient is high risk for bleeding with or without falls/trauma- recomm fall precautions at home  -home PT  -  repeat LE duplex in 2 weeks and f/u with vascular team    #Acute cholestatic hepatitis--suspect DILI   #h/o prior cholelithiasis  -LFT improving  -tbili 2.7---->0.9    --->55  --->115  -appreciate hepatology consult and recommendations  -continue ursodiol 500 mg daily x 1 week  -f/u with outpatietn GI/Hepatolgy clinic including f/u all hepatic labs     -RVP and covid negative  -UA negative  -normal wbc  -tylenol ppx and prednisone 10 mg bid with chemo agents    #Hyperkalemia, mild   - low k diet   - resolved     #COPD   - not in exacerbation.   - c/w inhalers     #Thyroid cancer with mets   - follows  at [facility]   - patient is on chronic prednisone 10mg BID and Bactrim for ppx     #Hypothyroidism - on synthroid.     #HTN/DM2-- c/w home med     DVT/GI ppx  guarded prognisis    FULL CODE---discussed goc with patient and wife-  he is currently full code but will consider options for dnr/dni and d/w his doctors at [facility] as outpatient---continue to address goc with patient      time spendt on discharge >30 minutes including coordination of discharge care    discharge home today after lunch (d/w patient and wife)---should get 11 am labs drawn prior to dc and then f/u results with hepatology clinic as outaptient   plan for f/u with outpatient pcp (Dr. Burks), Onc at [facility], Hepatology clinic

## 2023-06-04 LAB
CERULOPLASMIN SERPL-MCNC: 32 MG/DL — HIGH (ref 15–30)
EBV EA AB SER IA-ACNC: <5 U/ML — SIGNIFICANT CHANGE UP
EBV EA AB TITR SER IF: POSITIVE
EBV EA IGG SER-ACNC: NEGATIVE — SIGNIFICANT CHANGE UP
EBV NA IGG SER IA-ACNC: 64.9 U/ML — HIGH
EBV PATRN SPEC IB-IMP: SIGNIFICANT CHANGE UP
EBV VCA IGG AVIDITY SER QL IA: POSITIVE
EBV VCA IGM SER IA-ACNC: 105 U/ML — HIGH
EBV VCA IGM SER IA-ACNC: <10 U/ML — SIGNIFICANT CHANGE UP
EBV VCA IGM TITR FLD: NEGATIVE — SIGNIFICANT CHANGE UP
FERRITIN SERPL-MCNC: 589 NG/ML — HIGH (ref 30–400)
HAV IGG SER QL IA: SIGNIFICANT CHANGE UP
HAV IGM SER-ACNC: SIGNIFICANT CHANGE UP
HAV IGM SER-ACNC: SIGNIFICANT CHANGE UP
HBV CORE IGM SER-ACNC: SIGNIFICANT CHANGE UP
HBV CORE IGM SER-ACNC: SIGNIFICANT CHANGE UP
HBV SURFACE AB SER-ACNC: SIGNIFICANT CHANGE UP
HBV SURFACE AG SER-ACNC: SIGNIFICANT CHANGE UP
HBV SURFACE AG SER-ACNC: SIGNIFICANT CHANGE UP
HCV AB S/CO SERPL IA: 0.19 S/CO — SIGNIFICANT CHANGE UP (ref 0–0.99)
HCV AB SERPL-IMP: SIGNIFICANT CHANGE UP
IGA FLD-MCNC: 192 MG/DL — SIGNIFICANT CHANGE UP (ref 84–499)
IGG FLD-MCNC: 751 MG/DL — SIGNIFICANT CHANGE UP (ref 610–1660)
IGM SERPL-MCNC: 62 MG/DL — SIGNIFICANT CHANGE UP (ref 35–242)
KAPPA LC SER QL IFE: 1.61 MG/DL — SIGNIFICANT CHANGE UP (ref 0.33–1.94)
KAPPA/LAMBDA FREE LIGHT CHAIN RATIO, SERUM: 0.66 RATIO — SIGNIFICANT CHANGE UP (ref 0.26–1.65)
LAMBDA LC SER QL IFE: 2.43 MG/DL — SIGNIFICANT CHANGE UP (ref 0.57–2.63)

## 2023-06-05 LAB
ACE SERPL-CCNC: 34 U/L — SIGNIFICANT CHANGE UP (ref 14–82)
ANA TITR SER: NEGATIVE — SIGNIFICANT CHANGE UP
CMV DNA CSF QL NAA+PROBE: SIGNIFICANT CHANGE UP IU/ML
CMV DNA SPEC NAA+PROBE-LOG#: SIGNIFICANT CHANGE UP LOG10IU/ML
LKM AB SER-ACNC: <20.1 UNITS — SIGNIFICANT CHANGE UP (ref 0–20)

## 2023-06-06 LAB
CULTURE RESULTS: SIGNIFICANT CHANGE UP
CULTURE RESULTS: SIGNIFICANT CHANGE UP
HSV DNA1: SIGNIFICANT CHANGE UP
HSV DNA2: SIGNIFICANT CHANGE UP
HSV1 DNA BLD QL NAA+PROBE: SIGNIFICANT CHANGE UP
HSV2 DNA BLD QL NAA+PROBE: SIGNIFICANT CHANGE UP
SPECIMEN SOURCE: SIGNIFICANT CHANGE UP
SPECIMEN SOURCE: SIGNIFICANT CHANGE UP

## 2023-06-07 LAB
HEV AB FLD QL: POSITIVE
MITOCHONDRIA AB SER-ACNC: SIGNIFICANT CHANGE UP
SMOOTH MUSCLE AB SER-ACNC: SIGNIFICANT CHANGE UP

## 2023-06-08 DIAGNOSIS — Z79.899 OTHER LONG TERM (CURRENT) DRUG THERAPY: ICD-10-CM

## 2023-06-08 DIAGNOSIS — Z79.01 LONG TERM (CURRENT) USE OF ANTICOAGULANTS: ICD-10-CM

## 2023-06-08 DIAGNOSIS — M79.81 NONTRAUMATIC HEMATOMA OF SOFT TISSUE: ICD-10-CM
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
## 2023-06-08 DIAGNOSIS — Z79.52 LONG TERM (CURRENT) USE OF SYSTEMIC STEROIDS: ICD-10-CM

## 2023-06-08 DIAGNOSIS — C79.02 SECONDARY MALIGNANT NEOPLASM OF LEFT KIDNEY AND RENAL PELVIS: ICD-10-CM

## 2023-06-08 DIAGNOSIS — Z79.890 HORMONE REPLACEMENT THERAPY: ICD-10-CM

## 2023-06-08 DIAGNOSIS — R50.9 FEVER, UNSPECIFIED: ICD-10-CM

## 2023-06-08 DIAGNOSIS — G56.80 OTHER SPECIFIED MONONEUROPATHIES OF UNSPECIFIED UPPER LIMB: ICD-10-CM
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
## 2023-06-08 DIAGNOSIS — Z20.822 CONTACT WITH AND (SUSPECTED) EXPOSURE TO COVID-19: ICD-10-CM

## 2023-06-08 DIAGNOSIS — K75.89 OTHER SPECIFIED INFLAMMATORY LIVER DISEASES: ICD-10-CM

## 2023-06-08 DIAGNOSIS — I10 ESSENTIAL (PRIMARY) HYPERTENSION: ICD-10-CM

## 2023-06-08 DIAGNOSIS — C79.01 SECONDARY MALIGNANT NEOPLASM OF RIGHT KIDNEY AND RENAL PELVIS: ICD-10-CM

## 2023-06-08 DIAGNOSIS — C73 MALIGNANT NEOPLASM OF THYROID GLAND: ICD-10-CM
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
## 2023-06-08 DIAGNOSIS — Z86.718 PERSONAL HISTORY OF OTHER VENOUS THROMBOSIS AND EMBOLISM: ICD-10-CM

## 2023-06-08 DIAGNOSIS — K71.9 TOXIC LIVER DISEASE, UNSPECIFIED: ICD-10-CM

## 2023-06-08 DIAGNOSIS — D62 ACUTE POSTHEMORRHAGIC ANEMIA: ICD-10-CM

## 2023-06-08 DIAGNOSIS — Z88.5 ALLERGY STATUS TO NARCOTIC AGENT: ICD-10-CM

## 2023-06-08 DIAGNOSIS — J44.9 CHRONIC OBSTRUCTIVE PULMONARY DISEASE, UNSPECIFIED: ICD-10-CM

## 2023-06-08 DIAGNOSIS — E11.9 TYPE 2 DIABETES MELLITUS WITHOUT COMPLICATIONS: ICD-10-CM

## 2023-06-08 DIAGNOSIS — K80.20 CALCULUS OF GALLBLADDER WITHOUT CHOLECYSTITIS WITHOUT OBSTRUCTION: ICD-10-CM

## 2023-06-08 DIAGNOSIS — E78.5 HYPERLIPIDEMIA, UNSPECIFIED: ICD-10-CM

## 2023-06-08 DIAGNOSIS — D68.32 HEMORRHAGIC DISORDER DUE TO EXTRINSIC CIRCULATING ANTICOAGULANTS: ICD-10-CM

## 2023-06-08 DIAGNOSIS — E87.5 HYPERKALEMIA: ICD-10-CM

## 2023-06-08 DIAGNOSIS — E89.0 POSTPROCEDURAL HYPOTHYROIDISM: ICD-10-CM

## 2023-06-08 DIAGNOSIS — T45.515A ADVERSE EFFECT OF ANTICOAGULANTS, INITIAL ENCOUNTER: ICD-10-CM

## 2023-06-23 ENCOUNTER — EMERGENCY (EMERGENCY)
Facility: HOSPITAL | Age: 63
LOS: 0 days | Discharge: HOME | End: 2023-06-23
Attending: EMERGENCY MEDICINE
Payer: MEDICAID

## 2023-06-23 VITALS
DIASTOLIC BLOOD PRESSURE: 67 MMHG | WEIGHT: 220.02 LBS | RESPIRATION RATE: 18 BRPM | HEART RATE: 100 BPM | HEIGHT: 70 IN | TEMPERATURE: 98 F | SYSTOLIC BLOOD PRESSURE: 105 MMHG | OXYGEN SATURATION: 99 %

## 2023-06-23 DIAGNOSIS — E89.0 POSTPROCEDURAL HYPOTHYROIDISM: Chronic | ICD-10-CM

## 2023-06-23 DIAGNOSIS — I10 ESSENTIAL (PRIMARY) HYPERTENSION: ICD-10-CM

## 2023-06-23 DIAGNOSIS — Z85.850 PERSONAL HISTORY OF MALIGNANT NEOPLASM OF THYROID: ICD-10-CM

## 2023-06-23 DIAGNOSIS — R21 RASH AND OTHER NONSPECIFIC SKIN ERUPTION: ICD-10-CM

## 2023-06-23 DIAGNOSIS — J44.9 CHRONIC OBSTRUCTIVE PULMONARY DISEASE, UNSPECIFIED: ICD-10-CM

## 2023-06-23 DIAGNOSIS — E89.0 POSTPROCEDURAL HYPOTHYROIDISM: ICD-10-CM

## 2023-06-23 DIAGNOSIS — Z79.01 LONG TERM (CURRENT) USE OF ANTICOAGULANTS: ICD-10-CM

## 2023-06-23 LAB
ALBUMIN SERPL ELPH-MCNC: 3.3 G/DL — LOW (ref 3.5–5.2)
ALP SERPL-CCNC: 174 U/L — HIGH (ref 30–115)
ALT FLD-CCNC: 8 U/L — SIGNIFICANT CHANGE UP (ref 0–41)
ANION GAP SERPL CALC-SCNC: 11 MMOL/L — SIGNIFICANT CHANGE UP (ref 7–14)
AST SERPL-CCNC: 20 U/L — SIGNIFICANT CHANGE UP (ref 0–41)
BASOPHILS # BLD AUTO: 0.02 K/UL — SIGNIFICANT CHANGE UP (ref 0–0.2)
BASOPHILS NFR BLD AUTO: 0.2 % — SIGNIFICANT CHANGE UP (ref 0–1)
BILIRUB SERPL-MCNC: 0.4 MG/DL — SIGNIFICANT CHANGE UP (ref 0.2–1.2)
BUN SERPL-MCNC: 23 MG/DL — HIGH (ref 10–20)
CALCIUM SERPL-MCNC: 8.6 MG/DL — SIGNIFICANT CHANGE UP (ref 8.4–10.5)
CHLORIDE SERPL-SCNC: 101 MMOL/L — SIGNIFICANT CHANGE UP (ref 98–110)
CO2 SERPL-SCNC: 24 MMOL/L — SIGNIFICANT CHANGE UP (ref 17–32)
CREAT SERPL-MCNC: 0.7 MG/DL — SIGNIFICANT CHANGE UP (ref 0.7–1.5)
EGFR: 104 ML/MIN/1.73M2 — SIGNIFICANT CHANGE UP
EOSINOPHIL # BLD AUTO: 0.12 K/UL — SIGNIFICANT CHANGE UP (ref 0–0.7)
EOSINOPHIL NFR BLD AUTO: 1.1 % — SIGNIFICANT CHANGE UP (ref 0–8)
GLUCOSE SERPL-MCNC: 101 MG/DL — HIGH (ref 70–99)
HCT VFR BLD CALC: 33.5 % — LOW (ref 42–52)
HGB BLD-MCNC: 10.4 G/DL — LOW (ref 14–18)
IMM GRANULOCYTES NFR BLD AUTO: 0.7 % — HIGH (ref 0.1–0.3)
LYMPHOCYTES # BLD AUTO: 0.98 K/UL — LOW (ref 1.2–3.4)
LYMPHOCYTES # BLD AUTO: 8.9 % — LOW (ref 20.5–51.1)
MAGNESIUM SERPL-MCNC: 1.8 MG/DL — SIGNIFICANT CHANGE UP (ref 1.8–2.4)
MCHC RBC-ENTMCNC: 27.4 PG — SIGNIFICANT CHANGE UP (ref 27–31)
MCHC RBC-ENTMCNC: 31 G/DL — LOW (ref 32–37)
MCV RBC AUTO: 88.2 FL — SIGNIFICANT CHANGE UP (ref 80–94)
MONOCYTES # BLD AUTO: 1.04 K/UL — HIGH (ref 0.1–0.6)
MONOCYTES NFR BLD AUTO: 9.4 % — HIGH (ref 1.7–9.3)
NEUTROPHILS # BLD AUTO: 8.78 K/UL — HIGH (ref 1.4–6.5)
NEUTROPHILS NFR BLD AUTO: 79.7 % — HIGH (ref 42.2–75.2)
NRBC # BLD: 0 /100 WBCS — SIGNIFICANT CHANGE UP (ref 0–0)
PLATELET # BLD AUTO: 255 K/UL — SIGNIFICANT CHANGE UP (ref 130–400)
PMV BLD: 9.8 FL — SIGNIFICANT CHANGE UP (ref 7.4–10.4)
POTASSIUM SERPL-MCNC: 4.7 MMOL/L — SIGNIFICANT CHANGE UP (ref 3.5–5)
POTASSIUM SERPL-SCNC: 4.7 MMOL/L — SIGNIFICANT CHANGE UP (ref 3.5–5)
PROT SERPL-MCNC: 5.9 G/DL — LOW (ref 6–8)
RBC # BLD: 3.8 M/UL — LOW (ref 4.7–6.1)
RBC # FLD: 17.5 % — HIGH (ref 11.5–14.5)
SODIUM SERPL-SCNC: 136 MMOL/L — SIGNIFICANT CHANGE UP (ref 135–146)
WBC # BLD: 11.02 K/UL — HIGH (ref 4.8–10.8)
WBC # FLD AUTO: 11.02 K/UL — HIGH (ref 4.8–10.8)

## 2023-06-23 PROCEDURE — 99284 EMERGENCY DEPT VISIT MOD MDM: CPT

## 2023-06-23 PROCEDURE — 99283 EMERGENCY DEPT VISIT LOW MDM: CPT

## 2023-06-23 PROCEDURE — 85025 COMPLETE CBC W/AUTO DIFF WBC: CPT

## 2023-06-23 PROCEDURE — 80053 COMPREHEN METABOLIC PANEL: CPT

## 2023-06-23 PROCEDURE — 83735 ASSAY OF MAGNESIUM: CPT

## 2023-06-23 PROCEDURE — 36415 COLL VENOUS BLD VENIPUNCTURE: CPT

## 2023-06-23 RX ORDER — SODIUM CHLORIDE 9 MG/ML
1000 INJECTION INTRAMUSCULAR; INTRAVENOUS; SUBCUTANEOUS ONCE
Refills: 0 | Status: COMPLETED | OUTPATIENT
Start: 2023-06-23 | End: 2023-06-23

## 2023-06-23 RX ORDER — DIPHENHYDRAMINE HCL 50 MG
50 CAPSULE ORAL ONCE
Refills: 0 | Status: COMPLETED | OUTPATIENT
Start: 2023-06-23 | End: 2023-06-23

## 2023-06-23 RX ORDER — GABAPENTIN 400 MG/1
300 CAPSULE ORAL ONCE
Refills: 0 | Status: COMPLETED | OUTPATIENT
Start: 2023-06-23 | End: 2023-06-23

## 2023-06-23 RX ADMIN — SODIUM CHLORIDE 2000 MILLILITER(S): 9 INJECTION INTRAMUSCULAR; INTRAVENOUS; SUBCUTANEOUS at 17:55

## 2023-06-23 RX ADMIN — GABAPENTIN 300 MILLIGRAM(S): 400 CAPSULE ORAL at 17:56

## 2023-06-23 RX ADMIN — Medication 50 MILLIGRAM(S): at 17:56

## 2023-06-23 NOTE — ED PROVIDER NOTE - ATTENDING APP SHARED VISIT CONTRIBUTION OF CARE
Patient is a 63-year-old male with history of anaplastic thyroid cancer who was discharged from [facility] 2 days ago.  During his stay he had developed pain and paresthesias to bilateral hands and feet.  He was given gabapentin with minimal relief.  After discharge noted rash to his palms and soles and intraoral lesions.  No fever.    Exam: Blanching not urticarial coalescing rash to palms and soles, scattered areas to back as well.  No acute distress, no sloughing of skin  Plan: Labs, Derm follow-up as scheduled on Monday

## 2023-06-23 NOTE — ED PROVIDER NOTE - PHYSICAL EXAMINATION
CONST: Well appearing in NAD  ENT: No nasal discharge. Oropharynx normal appearing. Two small lesions to hard palate  NECK: Non-tender, no meningeal signs. normal ROM. supple   CARD: S1 S2; No jvd  RESP: Equal BS B/L, No wheezes, rhonchi or rales. No distress  MS: Normal ROM in all extremities. pulses 2 +. no calf tenderness or swelling  SKIN: Erythematous scattered rash to b/l hands and feet  NEURO: A&Ox3, No focal deficits. Strength 5/5 with no sensory deficits.

## 2023-06-23 NOTE — ED PROVIDER NOTE - CARE PROVIDER_API CALL
Rikki Juan  Dermatology  [address] [place] [id number]  Phone: [phone]  Fax: [phone]  Follow Up Time:

## 2023-06-23 NOTE — ED ADULT NURSE NOTE - NSSUHOSCREENINGYN_ED_ALL_ED
Yes - the patient is able to be screened Olanzapine Pregnancy And Lactation Text: This medication is pregnancy category C.   There are no adequate and well controlled trials with olanzapine in pregnant females.  Olanzapine should be used during pregnancy only if the potential benefit justifies the potential risk to the fetus.   In a study in lactating healthy women, olanzapine was excreted in breast milk.  It is recommended that women taking olanzapine should not breast feed.

## 2023-06-23 NOTE — ED PROVIDER NOTE - NS ED ATTENDING STATEMENT MOD
This was a shared visit with the LEONA. I reviewed and verified the documentation and independently performed the documented:

## 2023-06-23 NOTE — ED ADULT NURSE NOTE - NSFALLHARMRISKINTERV_ED_ALL_ED

## 2023-06-23 NOTE — ED PROVIDER NOTE - OBJECTIVE STATEMENT
63y M pmh anaplastic thyroid cancer presents for eval of rash. Pt developed burning pain to b/l hands in feet a couple days ago prior to dc from [facility]. Yesterday pt developed red rash to hands, feet and mouth that is moderately pruritic. Minimal improvement with gabapentin. Denies fever, ha, cp, sob, weakness, numbness

## 2023-06-23 NOTE — ED PROVIDER NOTE - PATIENT PORTAL LINK FT
You can access the FollowMyHealth Patient Portal offered by [facility] by registering at the following website: [email or web address]. By joining [employer]’s FollowMyHealth portal, you will also be able to view your health information using other applications (apps) compatible with our system.

## 2023-09-21 NOTE — ED PROVIDER NOTE - CLINICAL SUMMARY MEDICAL DECISION MAKING FREE TEXT BOX
COLONOSCOPY     Patient: Raeann Delacruz MRN: [id number]   YOB: 1984 Age: 41 y.o. Sex: female       Admitting Physician: Faustino Shepherd     Primary Care Physician: MAURICIO Benz CNP      DATE OF PROCEDURE: 9/21/2023  PROCEDURE: Colonoscopy    PREOPERATIVE DIAGNOSIS:   HPI: This is a 41y.o. year old female who presents today with right-sided abdominal pain and abnormal CT scan. ENDOSCOPIST: Faustino Shepherd MD    POSTOPERATIVE DIAGNOSIS:    1. Normal terminal ileum and colonic mucosa  2. Fair colon preparation    PLAN:   1. Resume colon cancer screening at age 40    INFORMED CONSENT:  Informed consent for colonoscopy was obtained. The benefits and risks including adverse medicine reaction and perforation have been explained. The patient's questions were answered and the patient agreed to proceed. ASA: ASA 2 - Patient with mild systemic disease with no functional limitations     SEDATION: MAC    The patient's vital signs, cardiac status, pulmonary status, abdominal status and mental status were stable for the procedure. The patient's vital signs and respiratory function as monitored by oxygen saturation remained stable. COLON PREPARATION:  The patient was given a split colon preparation and the preparation was adequate. There was liquid stool with particulate matter throughout the exam but mainly in the right colon. This was cleared with flushing and suctioning for the most part but overall the quality of the prep was only fair. Procedure Details: An anal exam was performed and this was unremarkable. A digital rectal exam was performed and no masses palpated. The Olympus videocolonoscope  was inserted in the rectum and carefully advanced to the cecum as identified by IC valve, crow's foot appearance and appendix. The cecum was photodocumented. The ileum was intubated and the scope advanced about 5 cm.   The colonoscope was slowly withdrawn and retrograde examination of the colon was carefully performed with inspection around and between folds. The ascending colon and cecum were intubated twice with repeat antegrade and retrograde examination. Retroflexion in the rectum was performed. Cecum Intubated: Yes    Findings: The ileum is normal.      There are no polyps or tumors.     Estimated Blood Loss:  None  Complications: None    Signed By: Cathy Vazquez MD patient has history of severe copd notes that he has had fevers and increasing sob over the past 2 weeks. he thought his symptoms were secondary to copd exacerbation however, patient found to be covid +, on exam patient has considerable increased wob and dsypnea. it improved with supplemental o2.  however patient will be admitted to ICU for further management

## 2023-10-30 NOTE — PROGRESS NOTE ADULT - PROVIDER SPECIALTY LIST ADULT
Critical Care
Infectious Disease
Internal Medicine
Pulmonology
no

## 2024-11-12 NOTE — PHYSICAL THERAPY INITIAL EVALUATION ADULT - GENERAL OBSERVATIONS, REHAB EVAL
This medical record reflects one or more clinical indicators suggestive of malnutrition and/or morbid obesity.    Malnutrition Findings:   Adult Malnutrition type: Chronic illness  Adult Degree of Malnutrition: Malnutrition of moderate degree  Malnutrition Characteristics: Muscle loss, Fat loss, Inadequate energy                360 Statement: Chronic moderate pro, bri malnutrition d/t condition, decreased appetite as evidence by <75% energy intake >  1month, mild muscle and fat loss at temples, orbitals, triceps, interroseous of hands, BMI 17, treated with oral diet, Ensure compact BID, discussed tips of increasing pro, bri intake at home. If appetite doesn't improve may need to consider appetite stimulant.    BMI Findings:  Adult BMI Classifications: Underweight < 18.5        Body mass index is 17.01 kg/m².     See Nutrition note dated 11/12/24 for additional details.  Completed nutrition assessment is viewable in the nutrition documentation.  
PT IE [id number]. Chart reviewed. Pt encountered sitting in b/s recliner. In NAD. pts family at b/s. + IV lock

## 2025-02-20 NOTE — ED PROVIDER NOTE - NS_EDPROVIDERDISPOUSERTYPE_ED_A_ED
..Seen By:  N/A      Consult Date: N/A      Procedure: COLON      Procedure Date: 4/18/2025      Endoscopist: Dr. Church      Location: [facility]      Prep Given: 2 Day Peg w/Magnesium Citrate      PS: Patient was informed to hold Ozempic 7 days prior to procedure.     Instructions were explained and given/mailed/e-mailed to patient. Patient is aware of the pre-covid testing and location of procedure. Prep was sent to pharmacy on file.      
Attending Attestation (For Attendings USE Only)...

## 2025-05-26 NOTE — CHART NOTE - NSCHARTNOTEFT_GEN_A_CORE
Needs an appt first. Do not overbook   Patient seen at bedside, resting comfortably.    All questions and concerns addressed during visit.     Patient encouraged to contact PA with any further issues.     Still with SOB/Cough but improving.  C/W Current Rx.
